# Patient Record
Sex: FEMALE | Race: WHITE
[De-identification: names, ages, dates, MRNs, and addresses within clinical notes are randomized per-mention and may not be internally consistent; named-entity substitution may affect disease eponyms.]

---

## 2017-01-09 ENCOUNTER — HOSPITAL ENCOUNTER (EMERGENCY)
Dept: HOSPITAL 75 - ER | Age: 22
Discharge: HOME | End: 2017-01-09
Payer: MEDICAID

## 2017-01-09 VITALS — WEIGHT: 135 LBS | HEIGHT: 66 IN | BODY MASS INDEX: 21.69 KG/M2

## 2017-01-09 VITALS — DIASTOLIC BLOOD PRESSURE: 95 MMHG | SYSTOLIC BLOOD PRESSURE: 130 MMHG

## 2017-01-09 DIAGNOSIS — G40.909: Primary | ICD-10-CM
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
## 2017-01-09 DIAGNOSIS — S40.011A: ICD-10-CM
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
## 2017-01-09 LAB
ALBUMIN SERPL-MCNC: 4.2 G/DL (ref 3.2–4.5)
ALT SERPL-CCNC: 13 U/L (ref 0–55)
ANION GAP SERPL CALC-SCNC: 9 MMOL/L (ref 5–14)
AST SERPL-CCNC: 14 U/L (ref 5–34)
BASOPHILS # BLD AUTO: 0 10^3/UL (ref 0–0.1)
BASOPHILS NFR BLD AUTO: 0 % (ref 0–10)
BILIRUB SERPL-MCNC: 0.4 MG/DL (ref 0.1–1)
BILIRUB UR QL STRIP: NEGATIVE
BUN SERPL-MCNC: 12 MG/DL (ref 7–18)
BUN/CREAT SERPL: 16
CALCIUM SERPL-MCNC: 8.8 MG/DL (ref 8.5–10.1)
CHLORIDE SERPL-SCNC: 108 MMOL/L (ref 98–107)
CO2 SERPL-SCNC: 21 MMOL/L (ref 21–32)
CREAT SERPL-MCNC: 0.74 MG/DL (ref 0.6–1.3)
EOSINOPHIL # BLD AUTO: 0.1 10^3/UL (ref 0–0.3)
EOSINOPHIL NFR BLD AUTO: 1 % (ref 0–10)
ERYTHROCYTE [DISTWIDTH] IN BLOOD BY AUTOMATED COUNT: 13.2 % (ref 10–14.5)
ETHANOL SERPL-MCNC: < 10 MG/DL (ref ?–10)
GFR SERPLBLD BASED ON 1.73 SQ M-ARVRAT: > 60 ML/MIN
GLUCOSE SERPL-MCNC: 95 MG/DL (ref 70–105)
KETONES UR QL STRIP: NEGATIVE
LEUKOCYTE ESTERASE UR QL STRIP: NEGATIVE
LYMPHOCYTES # BLD AUTO: 1.4 X 10^3 (ref 1–4)
LYMPHOCYTES NFR BLD AUTO: 14 % (ref 12–44)
MAGNESIUM SERPL-MCNC: 2.2 MG/DL (ref 1.8–2.4)
MCH RBC QN AUTO: 28 PG (ref 25–34)
MCHC RBC AUTO-ENTMCNC: 34 G/DL (ref 32–36)
MCV RBC AUTO: 83 FL (ref 80–99)
MONOCYTES # BLD AUTO: 0.8 X 10^3 (ref 0–1)
MONOCYTES NFR BLD AUTO: 8 % (ref 0–12)
NEUTROPHILS # BLD AUTO: 7.8 X 10^3 (ref 1.8–7.8)
NEUTROPHILS NFR BLD AUTO: 78 % (ref 42–75)
NITRITE UR QL STRIP: NEGATIVE
PH UR STRIP: 8 [PH] (ref 5–9)
PLATELET # BLD: 264 10^3/UL (ref 130–400)
PMV BLD AUTO: 11 FL (ref 7.4–10.4)
POTASSIUM SERPL-SCNC: 3.5 MMOL/L (ref 3.6–5)
PROT SERPL-MCNC: 7 G/DL (ref 6.4–8.2)
PROT UR QL STRIP: NEGATIVE
RBC # BLD AUTO: 4.56 10^6/UL (ref 4.35–5.85)
SODIUM SERPL-SCNC: 138 MMOL/L (ref 135–145)
SP GR UR STRIP: 1.01 (ref 1.02–1.02)
SQUAMOUS #/AREA URNS HPF: (no result) /HPF
UROBILINOGEN UR-MCNC: NORMAL MG/DL
WBC # BLD AUTO: 10 10^3/UL (ref 4.3–11)
WBC #/AREA URNS HPF: (no result) /HPF

## 2017-01-09 PROCEDURE — 84703 CHORIONIC GONADOTROPIN ASSAY: CPT

## 2017-01-09 PROCEDURE — 81000 URINALYSIS NONAUTO W/SCOPE: CPT

## 2017-01-09 PROCEDURE — 36415 COLL VENOUS BLD VENIPUNCTURE: CPT

## 2017-01-09 PROCEDURE — 80320 DRUG SCREEN QUANTALCOHOLS: CPT

## 2017-01-09 PROCEDURE — 93041 RHYTHM ECG TRACING: CPT

## 2017-01-09 PROCEDURE — 96374 THER/PROPH/DIAG INJ IV PUSH: CPT

## 2017-01-09 PROCEDURE — 84443 ASSAY THYROID STIM HORMONE: CPT

## 2017-01-09 PROCEDURE — 73030 X-RAY EXAM OF SHOULDER: CPT

## 2017-01-09 PROCEDURE — 80053 COMPREHEN METABOLIC PANEL: CPT

## 2017-01-09 PROCEDURE — 80306 DRUG TEST PRSMV INSTRMNT: CPT

## 2017-01-09 PROCEDURE — 96375 TX/PRO/DX INJ NEW DRUG ADDON: CPT

## 2017-01-09 PROCEDURE — 96361 HYDRATE IV INFUSION ADD-ON: CPT

## 2017-01-09 PROCEDURE — 71010: CPT

## 2017-01-09 PROCEDURE — 85025 COMPLETE CBC W/AUTO DIFF WBC: CPT

## 2017-01-09 PROCEDURE — 83735 ASSAY OF MAGNESIUM: CPT

## 2017-01-09 PROCEDURE — 72125 CT NECK SPINE W/O DYE: CPT

## 2017-01-09 PROCEDURE — 70450 CT HEAD/BRAIN W/O DYE: CPT

## 2017-01-09 NOTE — DIAGNOSTIC IMAGING REPORT
PROCEDURE: CT head and CT cervical spine without contrast.



TECHNIQUE: Multiple contiguous axial images were obtained

through the brain and cervical spine without the use of

intravenous contrast. Sagittal and coronal reformations through

the cervical spine were then performed.



INDICATION:  Seizure, head and neck pain.



COMPARISON: There are no prior studies available for comparison.



CT HEAD:

There is no mass, shift of the midline or hemorrhage to suggest

an acute intracranial abnormality. The normal tentorial blush is

noted. The ventricles are not abnormally dilated. The bone

window shows no evidence for a fracture or for a destructive

lesion. The sinuses were not visualized in their entirety. Where

visualized, the sinuses are clear. However, there does appear to

be sclerosis of both mastoid air cells, particularly on the

right. This may be a sequela of prior episodes of mastoiditis.

Clinical follow up is recommended.



The orbits are symmetrical and within normal limits.



IMPRESSION:

1. There is no evidence for acute intracranial abnormality. If

clinical concern regarding an acute abnormality persists

however, then MRI would be recommended for further study.

2. The appearance of the mastoid air cells does suggest previous

episodes of mastoiditis.



CT CERVICAL SPINE

The reconstructed parasagittal images show some straightening of

the cervical spine. This may be secondary to muscle spasm and/or

positioning. There is no fracture or acute bony abnormality

identified.



There is no evidence for spinal stenosis or nerve root

encroachment.



There is no sign of retropharyngeal edema. The thyroid gland is

generally unremarkable.  The lung apices are clear.



IMPRESSION:

There is no evidence for an acute bony abnormality of the

cervical spine.







Dictated by:



Dictated on workstation # [id number]

## 2017-01-09 NOTE — DIAGNOSTIC IMAGING REPORT
EXAMINATION: Right shoulder



INDICATION: Shoulder pain



Three views were obtained. There is no fracture, dislocation or

acute bony abnormality evident. The shoulder joint is fairly

well-maintained. The soft tissues are unremarkable.



IMPRESSION: There is no evidence for an acute bony abnormality.



Dictated by:



Dictated on workstation # [id number]

## 2017-01-09 NOTE — XMS REPORT
Continuity of Care Document

 Created on: 2016



NIKKI HUBBARD

External Reference #: [id number]

: 1995

Sex: Female



Demographics







 Address  [address]

[place]  [address]

 

 Home Phone  [phone]

 

 Preferred Language  English

 

 Marital Status  Unknown

 

 [employer] Affiliation  Unknown

 

 Race  Unknown

 

 Ethnic Group  Unknown





Author







 Author  [facility]

 

 Organization  [facility]

 

 Address  Unknown

 

 Phone  Unavailable







Support







 Name  Relationship  Address  Phone

 

 DEDE PATRICK MD  Caregiver  [address] [address] [phone]

 

 CANDELARIA COOPER MD  Caregiver  [address]

[place], [place]  [address] [phone]

 

 TOMYSVIPIN EMILY  Next Of Kin  [address]

[place], [place]  [id number] [phone]







Care Team Providers







 Care Team Member Name  Role  Phone

 

 CANDELARIA COOPER MD  PCP  [phone]







Insurance Providers







 Payer Name  Policy Number  Subscriber Name  Relationship

 

 [facility]  [id number]  Nikki Hubbard Self / Same As Patient







Advance Directives







 Directive  Response  Recorded Date/Time

 

 Advance Directives  No  16 10:09pm

 

 Health Care Power of   No  16 10:09pm

 

 Organ Donor  No  16 10:09pm

 

 Resuscitation Status  Full Code  16 10:09pm







Problems

Active Problems







 Medical Problem  Onset Date  Status

 

 Urinary tract infection  Unknown  Acute







Medications

Current Home Medications







 Medication  Dose  Units  Route  Directions  Days/Qty  Instructions  Start Date

 

 Prenatal Vit W-Ca,Fe,Fa(<1 Mg) 1 Each  1  Each  Oral  Daily        16

 

 Ferrous Sulfate 325 Mg  325  Mg  Oral  Twice A Day        16





Past Home Medications







 Medication  Directions  Ordered  Status

 

 Cephalexin 500 Mg Capsule, 500 Mg Oral  Three Times A Day  10/12/15  
Discontinued







Social History







 Social History Problem  Response  Recorded Date/Time

 

 Alcohol Use  Denies Use  10/12/2015 6:51pm

 

 Recreational Drug Use  No  10/12/2015 6:51pm

 

 Recent Foreign Travel  No  2016 10:12pm

 

 Smoking Status  Former Smoker  2016 10:10pm









 Query  Response  Start Date  Stop Date

 

 Smoking Status  Former Smoker      







Hospital Discharge Instructions

No hospital discharge instructions.



Plan of Care







 Discharge Date  16 11:30pm

 

 Instructions/Education Provided  OB OUTPATIENT DISCHARGE

 

 Prescriptions  See Medication Section







Functional Status

No functional status results.



Allergies, Adverse Reactions, Alerts

No known allergies.



Immunizations







 Name  Given  Type

 

 Tetanus Booster (TDap)  Unknown  Historical







Vital Signs

Acute Vital Signs





 Vital  Response  Date/Time

 

 Temperature (Fahrenheit)  98.3 degrees F (97.6 - 99.5)  2016 10:02pm

 

 Temperature (Calculated Celsius)  36.37693 degrees C (36.4 - 37.5)  2016 
10:02pm

 

 Temperature Source  Tympanic  2016 10:02pm

 

 Pulse Rate (adult)  71 bpm (60 - 90)  2016 10:43pm

 

 Respiratory Rate  18 bpm (12 - 24)  2016 10:43pm

 

 Blood Pressure  143/72 mm Hg  2016 10:43pm

 

    Blood Pressure Mean  95 mm Hg  2016 10:43pm

 

 Pain      

 

    Pain Intensity  7  2016 10:11pm

 

 Height (Feet)  5 feet  2016 10:17pm

 

 Height (Inches)  4.00 inches  2016 10:17pm

 

 Height (Calculated Centimeters)  162.024800 cm  2016 10:17pm

 

 Weight (Pounds)  153 pounds  2016 10:17pm

 

 Weight (Ounces)  6.0 oz  2016 10:17pm

 

 Weight (Calculated Grams)  01267.730 gm  2016 10:17pm

 

 Weight (Calculated Kilograms)  69.835200 kilograms  2016 10:17pm

 

 Calculated BMI  26.3  2016 10:17pm







Results

Laboratory Results







 Test Name  Result  Units  Flags  Reference  Collection Date/Time  Result Date/
Time  Comments

 

 Urine Color  YELLOW           2016 10:00pm  2016 10:55pm   

 

 Urine Clarity  CLEAR           2016 10:00pm  2016 10:55pm   

 

 Urine pH  7        5-9  2016 10:00pm  2016 10:55pm   

 

 Urine Specific Gravity  1.010     *  1.016-1.022  2016 10:00pm  2016 10:55pm   

 

 Urine Protein  NEGATIVE        NEGATIVE  2016 10:00pm  2016 10:
55pm   

 

 Urine Glucose (UA)  NEGATIVE        NEGATIVE  2016 10:00pm  2016 10
:55pm   

 

 Urine RBC (Auto)  NEGATIVE        NEGATIVE  2016 10:00pm  2016 10:
55pm   

 

 Urine Ketones  NEGATIVE        NEGATIVE  2016 10:00pm  2016 10:
55pm   

 

 Urine Nitrite  NEGATIVE        NEGATIVE  2016 10:00pm  2016 10:
55pm   

 

 Urine Bilirubin  NEGATIVE        NEGATIVE  2016 10:00pm  2016 10:
55pm   

 

 Urine Urobilinogen  NORMAL  MG/DL     NORMAL  2016 10:00pm  2016 10
:55pm   

 

 Urine Leukocyte Esterase  1+     *  NEGATIVE  2016 10:00pm  2016 10
:55pm   

 

 Urine RBC  NONE  /HPF        2016 10:00pm  2016 10:55pm   

 

 Urine WBC  2-5  /HPF        2016 10:00pm  2016 10:55pm   

 

 Urine Bacteria  TRACE  /HPF        2016 10:00pm  2016 10:55pm   

 

 Urine Squamous Epithelial Cells  0-2  /HPF        2016 10:00pm  2016 10:55pm   

 

 Urine Crystals  PRESENT  /LPF  *     2016 10:00pm  2016 10:55pm   

 

 Urine Amorphous Sediment  FEW EDDIE PHOSPHATE  /LPF  *     2016 10:00pm  
2016 10:55pm   

 

 Urine Casts  NONE  /LPF        2016 10:00pm  2016 10:55pm   

 

 Urine Mucus  NEGATIVE  /LPF        2016 10:00pm  2016 10:55pm   

 

 Urine Culture Indicated  NO           2016 10:00pm  2016 10:55pm   







Procedures

No known history of procedures.



Encounters







 Encounter  Location  Arrival/Admit Date  Discharge/Depart Date  Attending 
Provider

 

 Departed Clinic  [facility]  16 9:48pm  16 11:
30pm  DEDE PATRICK MD

## 2017-01-09 NOTE — DIAGNOSTIC IMAGING REPORT
EXAM: PA chest at 7:50 p.m.



INDICATION: Seizure.



COMPARISON:  There are no prior studies available for

comparison.



FINDINGS:

The heart size is within normal limits. The lungs are clear.

There is no evidence of failure, pneumonia or for a pleural

effusion. The mediastinum is not widened. The osseous structures

are intact.



IMPRESSION:

There is no evidence for an acute cardiopulmonary abnormality.



Dictated by:



Dictated on workstation # [id number]

## 2017-01-09 NOTE — ED NEUROLOGICAL PROBLEM
General


Chief Complaint:  Neurological Problems


Stated Complaint:  SEIZURE


Nursing Triage Note:  


c/o seizure activity. Incident happened approx 1600 today. Pt awake, alert, and 


active on ER admission. Hx of a seizure approx 3 years ago.


Nursing Sepsis Screen:  No Definite Risk


Source:  patient, family, EMS


Exam Limitations:  no limitations





History of Present Illness


Time seen by provider:  18:16


Initial Comments


This 21-year-old presents to the emergency room with seizure like activity.  

Patient was at home with her  when he heard her screaming.  He came into 

the room where he found her lying on the floor in a rigid posture.  She was 

unresponsive at the time.  He reports frothing saliva and blood in her mouth.  

The altered state and lasted approximately 1-2 minutes.  She then became alert 

with relaxed posture.  However, she remained somewhat confused and cognitively 

sluggish in a post ictal-like state.  She is alert and oriented upon 

presentation to the emergency room.  Patient reports a history of seizure 

several years ago.  She has not taken any seizure medication in about 3 years 

and has not had a seizure during that time.  She complains of headache, left 

leg pain, and right arm pain.  Her most significant pain is in the right 

shoulder.  She has previously seen a neurologist and stopped taking anti-

seizure medication at her physician's direction.  She does not recall the name 

of her neurologist but she sees Dr. Cooper as her primary care provider.  She 

denies pregnancy or any recent illness.





Allergies and Home Medications


Allergies


Coded Allergies:  


     No Known Drug Allergies (Unverified , 10/12/15)





Home Medications


Ferrous Sulfate 325 Mg Tablet. 325 MG PO BID (Reported) 


Levetiracetam 500 Mg Tablet #30 500 MG PO BID 


   Prescribed by: ARJUN KNOWLES on 1/10/17 0658


Prenatal Vit W-Ca,Fe,FA(<1 mg) 1 Each Tablet 1 EACH PO DAILY (Reported) 





Constitutional:   no symptoms reported


Eyes:   No Symptoms Reported


Ears, Nose, Mouth, Throat:   see HPI


Respiratory:   no symptoms reported


Cardiovascular:   no symptoms reported


Gastrointestinal:   no symptoms reported


Genitourinary:   no symptoms reported


Pregnant:  No


Musculoskeletal:   see HPI


Skin:   no symptoms reported


Psychiatric/Neurological:   See HPI


Endocrine:   No Symptoms Reported





Past Medical-Social-Family Hx


Patient Social History


Alcohol Use:  Denies Use


Recreational Drug Use:  No


Smoking Status:  Former Smoker


Recent Foreign Travel:  No


Contact w/Someone Who Travel:  No


Recent Infectious Disease Expo:  No


Recent Hopitalizations:  No


Physical Abuse Screen:  No


Sexual Abuse:  No





Immunizations Up To Date


Tetanus Booster (TDap):  Unknown





Surgeries


HX Surgeries:  No





Respiratory


Hx Respiratory Disorders:  No





Cardiovascular


Hx Cardiac Disorders:  Yes


Cardiac Disorders:  Hypertension





Neurological


Hx Neurological Disorders:  No





Reproductive System


Hx Reproductive Disorders:  No





Genitourinary


Hx Genitourinary Disorders:  No





Gastrointestinal


Hx Gastrointestinal Disorders:  No





Musculoskeletal


Hx Musculoskeletal Disorders:  No





Endocrine


Hx Endocrine Disorders:  No





HEENT


HX ENT Disorders:  No





Cancer


Hx Cancer:  No





Psychosocial


Hx Psychiatric Problems:  No





Integumentary


HX Skin/Integumentary Disorder:  No





Blood Transfusions


Hx Blood Disorders:  No


Adverse Reaction to a Blood Tr:  No





Family Medical History


Significant Family History:  No Pertinent Family Hx


Family Medial History:  


Patient reports no known family medical history.





Physical Exam


Vital Signs





 Vital Sign - Last 12Hours








 17





 17:37 21:17


 


Temp 98.1 


 


Pulse 70 


 


Resp  17


 


B/P 130/95 


 


Pulse Ox  100


 


O2 Delivery Room Air 





Capillary Refill : Less Than 3 Seconds


General Appearance:   WD/WN no apparent distress


HEENT:   PERRL/EOMI normal ENT inspection TMs normal other (Poor dentition with 

fractured or decayed teeth)


Neck:   non-tender full range of motion supple normal inspection


Respiratory:   lungs clear normal breath sounds no respiratory distress no 

accessory muscle use


Cardiovascular:   regular rate, rhythm no edema no murmur


Gastrointestinal:   normal bowel sounds non tender soft


Extremities:   normal inspection no pedal edema


Neurologic/Psychiatric:   CNs II-XII nml as tested no motor/sensory deficits 

alert normal mood/affect oriented x 3


Crainal Nerves:   normal hearing normal speech PERRL


Coordination/Gait:   normal finger to nose


Motor/Sensory:   no motor deficit no sensory deficit


Skin:   normal color warm/dry





Progress/Results/Core Measures


Results/Orders


Lab Results





 Laboratory Tests








Test


  17


17:35 17


18:50 Range/Units


 


 


Ur Tricyclic Antidepressants


Screen NEGATIVE 


  


  NEGATIVE  


 


 


Urine Amorphous Sediment


  MOD EDDIE


PHOSPHATE H 


   /LPF


 


 


Urine Amphetamines Screen NEGATIVE   NEGATIVE  


 


Urine Bacteria FEW H   /HPF


 


Urine Barbiturates Screen NEGATIVE   NEGATIVE  


 


Urine Benzodiazepines Screen NEGATIVE   NEGATIVE  


 


Urine Bilirubin NEGATIVE   NEGATIVE  


 


Urine Cannabinoids Screen NEGATIVE   NEGATIVE  


 


Urine Casts NONE    /LPF


 


Urine Clarity VERY CLOUDY H   


 


Urine Cocaine Screen NEGATIVE   NEGATIVE  


 


Urine Color YELLOW    


 


Urine Crystals PRESENT H   /LPF


 


Urine Culture Indicated NO    


 


Urine Glucose (UA) NEGATIVE   NEGATIVE  


 


Urine Ketones NEGATIVE   NEGATIVE  


 


Urine Leukocyte Esterase NEGATIVE   NEGATIVE  


 


Urine Methadone Screen NEGATIVE   NEGATIVE  


 


Urine Methamphetamines Screen NEGATIVE   NEGATIVE  


 


Urine Mucus NEGATIVE    /LPF


 


Urine Nitrite NEGATIVE   NEGATIVE  


 


Urine Opiates Screen NEGATIVE   NEGATIVE  


 


Urine Oxycodone Screen NEGATIVE   NEGATIVE  


 


Urine Phencyclidine Screen NEGATIVE   NEGATIVE  


 


Urine Propoxyphene Screen NEGATIVE   NEGATIVE  


 


Urine Protein NEGATIVE   NEGATIVE  


 


Urine RBC NONE    /HPF


 


Urine RBC (Auto) NEGATIVE   NEGATIVE  


 


Urine Specific Gravity 1.015 L  1.016-1.022  


 


Urine Squamous Epithelial


Cells 5-10 


  


   /HPF


 


 


Urine Urobilinogen NORMAL   NORMAL  MG/DL


 


Urine WBC NONE    /HPF


 


Urine pH 8   5-9  


 


Alanine Aminotransferase


(ALT/SGPT) 


  13 


  0-55  U/L


 


 


Albumin  4.2  3.2-4.5  G/DL


 


Alkaline Phosphatase  37 L   U/L


 


Anion Gap  9  5-14  MMOL/L


 


Aspartate Amino Transf


(AST/SGOT) 


  14 


  5-34  U/L


 


 


BUN/Creatinine Ratio  16   


 


Basophils # (Auto)


  


  0.0 


  0.0-0.1


10^3/uL


 


Basophils (%) (Auto)  0  0-10  %


 


Blood Urea Nitrogen  12  7-18  MG/DL


 


Calcium Level  8.8  8.5-10.1  MG/DL


 


Carbon Dioxide Level  21  21-32  MMOL/L


 


Chloride Level  108 H   MMOL/L


 


Creatinine


  


  0.74 


  0.60-1.30


MG/DL


 


Eosinophils # (Auto)


  


  0.1 


  0.0-0.3


10^3/uL


 


Eosinophils (%) (Auto)  1  0-10  %


 


Estimat Glomerular Filtration


Rate 


  > 60 


   


 


 


Glucose Level  95    MG/DL


 


Hematocrit  38  35-52  %


 


Hemoglobin  12.9  11.5-16.0  G/DL


 


Lymphocytes # (Auto)  1.4  1.0-4.0  X 10^3


 


Lymphocytes (%) (Auto)  14  12-44  %


 


Magnesium Level  2.2  1.8-2.4  MG/DL


 


Mean Corpuscular Hemoglobin  28  25-34  PG


 


Mean Corpuscular Hemoglobin


Concent 


  34 


  32-36  G/DL


 


 


Mean Corpuscular Volume  83  80-99  FL


 


Mean Platelet Volume  11.0 H 7.4-10.4  FL


 


Monocytes # (Auto)  0.8  0.0-1.0  X 10^3


 


Monocytes (%) (Auto)  8  0-12  %


 


Neutrophils # (Auto)  7.8  1.8-7.8  X 10^3


 


Neutrophils (%) (Auto)  78 H 42-75  %


 


Platelet Count


  


  264 


  130-400


10^3/uL


 


Potassium Level  3.5 L 3.6-5.0  MMOL/L


 


Red Blood Count


  


  4.56 


  4.35-5.85


10^6/uL


 


Red Cell Distribution Width  13.2  10.0-14.5  %


 


Serum Alcohol  < 10  <10  MG/DL


 


Serum Pregnancy Test,


Qualitative 


  NEGATIVE 


  NEGATIVE  


 


 


Sodium Level  138  135-145  MMOL/L


 


TSH [place] Testing


  


  1.44 


  0.35-4.94


UIU/ML


 


Total Bilirubin  0.4  0.1-1.0  MG/DL


 


Total Protein  7.0  6.4-8.2  G/DL


 


White Blood Count


  


  10.0 


  4.3-11.0


10^3/uL








My Orders





 Orders-ARJUN SUAREZ MD


Ct Head/Cervical Spine Wo (17 18:31)


Monitor-Rhythm Ecg Trace Only (17 18:31)


Chest 1 View, Ap/Pa Only (17 18:31)


Shoulder, Right, 3 Views (17 18:31)


Alcohol (17 18:31)


Cbc With Automated Diff (17 18:31)


Comprehensive Metabolic Panel (17 18:31)


Hcg,Qualitative Serum (17 18:31)


Magnesium (17 18:31)


Thyroid Analyzer (17 18:31)


Saline Lock/Iv-Start (17 18:31)


Ns Iv 500 Ml (Sodium Chloride 0.9%) (17 18:31)


Fentanyl  Injection (Sublimaze Injection (17 18:45)


Ketorolac Injection (Toradol Injection) (17 21:15)


Levetiracetam Tablet (Keppra Tablet) (17 21:15)


Levetiracetam Tablet (Keppra Tablet) (17 21:00)





Medications Given in ED





 Current Medications








 Medications  Dose


 Ordered  Sig/Nestor


 Route  Start Time


 Stop Time Status Last Admin


Dose Admin


 


 Ketorolac


 Tromethamine  30 mg  ONCE  ONCE


 IVP  17 21:15


 17 21:16 DC 1/9/17 21:11


30 MG


 


 Levetiracetam  500 mg  ONCE  ONCE


 PO  17 21:15


 17 21:16 DC 17 21:10


500 MG








Vital Signs/I&O





 Vital Sign - Last 12Hours








 17





 17:37 18:54 21:17


 


Temp 98.1 98.1 98.1


 


Pulse 70  70


 


Resp   17


 


B/P 130/95  


 


Pulse Ox   100


 


O2 Delivery Room Air  Room Air








Blood Pressure Mean:  107


Progress Note :  


Progress Note


Patient seen and examined.  IV fluids, imaging, and fentanyl were ordered.  

Imaging revealed no bony injuries or intracranial injury.  Labs were relatively 

unremarkable.  Case was reviewed with Dr. Cooper who would like for the patient 

to be started on Keppra.  The first dose of Keppra was administered in the ER.  

Toradol was administered for additional pain relief.





Diagnostic Imaging





   Diagonstic Imaging:  Xray


   Plain Films/CT/US/NM/MRI:  other (Right shoulder)


Comments


Right shoulder x-ray viewed by me and report reviewed.  See report below:





NAME:      NIKKI HUBBARD


MED REC#:   [id number]


ACCOUNT#:   [id number]


PT STATUS:   DEP ER


:      1995


PHYSICIAN:    ARJUN SUAREZ MD


ADMIT DATE:   17/ER


***Signed***


Date of Exam:   17





SHOULDER, RIGHT, 3 VIEWS


 


EXAMINATION: Right shoulder





INDICATION: Shoulder pain





Three views were obtained. There is no fracture, dislocation or


acute bony abnormality evident. The shoulder joint is fairly


well-maintained. The soft tissues are unremarkable.





IMPRESSION: There is no evidence for an acute bony abnormality.





Dictated by:





Dictated on workstation # [id number]





Dict:   17


Trans:   17 [id number]


KEEGAN  [id number]





Interpreted by:       TOSHIA TAVERAS MD


Electronically signed by:TOSHIA TAVERAS MD   [id number]








   Diagonstic Imaging:  CT


   Plain Films/CT/US/NM/MRI:  c-spine, head


Comments


NAME:      NIKKI HUBBARD


MED REC#:   [id number]


ACCOUNT#:   [id number]


PT STATUS:   DEP ER


:      1995


PHYSICIAN:    ARJUN SUAREZ MD


ADMIT DATE:   17/ER


***Signed***


Date of Exam:   17





CT HEAD/CERVICAL SPINE WO


 


PROCEDURE: CT head and CT cervical spine without contrast.





TECHNIQUE: Multiple contiguous axial images were obtained


through the brain and cervical spine without the use of


intravenous contrast. Sagittal and coronal reformations through


the cervical spine were then performed.





INDICATION:  Seizure, head and neck pain.





COMPARISON: There are no prior studies available for comparison.





CT HEAD:


There is no mass, shift of the midline or hemorrhage to suggest


an acute intracranial abnormality. The normal tentorial blush is


noted. The ventricles are not abnormally dilated. The bone


window shows no evidence for a fracture or for a destructive


lesion. The sinuses were not visualized in their entirety. Where


visualized, the sinuses are clear. However, there does appear to


be sclerosis of both mastoid air cells, particularly on the


right. This may be a sequela of prior episodes of mastoiditis.


Clinical follow up is recommended.





The orbits are symmetrical and within normal limits.





IMPRESSION:


1. There is no evidence for acute intracranial abnormality. If


clinical concern regarding an acute abnormality persists


however, then MRI would be recommended for further study.


2. The appearance of the mastoid air cells does suggest previous


episodes of mastoiditis.





CT CERVICAL SPINE


The reconstructed parasagittal images show some straightening of


the cervical spine. This may be secondary to muscle spasm and/or


positioning. There is no fracture or acute bony abnormality


identified.





There is no evidence for spinal stenosis or nerve root


encroachment.





There is no sign of retropharyngeal edema. The thyroid gland is


generally unremarkable.  The lung apices are clear.





IMPRESSION:


There is no evidence for an acute bony abnormality of the


cervical spine.











Dictated by:





Dictated on workstation # [id number]





Dict:   17


Trans:   17


[facility]  [phone]





Interpreted by:       TOSHIA TAVERAS MD


Electronically signed by:TOSHIA TAVERAS MD   172








   Diagonstic Imaging:  Xray


   Plain Films/CT/US/NM/MRI:  chest


Comments


NAME:      NIKKI HUBBARD


MED REC#:   [id number]


ACCOUNT#:   [id number]


PT STATUS:   DEP ER


:      1995


PHYSICIAN:    ARJUN SUAREZ MD


ADMIT DATE:   17/ER


***Signed***


Date of Exam:   17





CHEST 1 VIEW, AP/PA ONLY


 


EXAM: PA chest at 7:50 p.m.





INDICATION: Seizure.





COMPARISON:  There are no prior studies available for


comparison.





FINDINGS:


The heart size is within normal limits. The lungs are clear.


There is no evidence of failure, pneumonia or for a pleural


effusion. The mediastinum is not widened. The osseous structures


are intact.





IMPRESSION:


There is no evidence for an acute cardiopulmonary abnormality.





Dictated by:





Dictated on workstation # [id number]





Dict:   17


Trans:   17


[facility]  [id number]





Interpreted by:       TOSHIA TAVERAS MD


Electronically signed by:TOSHIA TAVERAS MD   17





Departure


Impression


Impression:  


 Primary Impression:  


 Seizure


 Additional Impression:  


 Contusion of right shoulder


 Qualified Code:  S40.011A - Contusion of right shoulder, initial encounter


Disposition:   HOME, SELF-CARE


Condition:  Improved





Departure-Patient Inst.


Decision time for Depature:  21:07


Referrals:  


CANDELARIA COOPER MD (PCP/Family)


Primary Care Physician


Patient Instructions:  Seizures





Add. Discharge Instructions:


Stay well-hydrated and eat a well-balanced diet.  Follow-up with Dr. Cooper as 

soon as possible.  Use your Keppra as prescribed.  Return to emergency room if 

you have worsening symptoms.  For pain you may take Tylenol up to 1000 mg every 

6 hours as needed and/or ibuprofen up to 600 mg every 6 hours as needed.








All discharge instructions reviewed with patient and/or family. Voiced 

understanding.


Scripts


Levetiracetam (Keppra)500 Mg [id number] Mg PO BID #30 TAB


   Prov:ARJUN SUAREZ MD         1/10/17





Copy


Copies To 1:   CANDELARIA COOPER MD, JOSHUA T MD 2017 21:08


Scripts


Levetiracetam (Keppra)500 Mg [id number] Mg PO BID #30 TAB


   Prov:ARJUN SUAREZ MD         1/10/17








ARJUN SUAREZ MD 2017 21:08

## 2017-01-20 ENCOUNTER — HOSPITAL ENCOUNTER (OUTPATIENT)
Dept: HOSPITAL 75 - RT | Age: 22
End: 2017-01-20
Attending: FAMILY MEDICINE
Payer: MEDICAID

## 2017-01-20 DIAGNOSIS — R56.9: Primary | ICD-10-CM

## 2017-01-20 PROCEDURE — 95819 EEG AWAKE AND ASLEEP: CPT

## 2017-01-20 NOTE — XMS REPORT
Continuity of Care Document

 Created on: 2016



NIKKI HUBBARD

External Reference #: [id number]

: 1995

Sex: Female



Demographics







 Address  [address]

[place]  [address]

 

 Home Phone  [phone]

 

 Preferred Language  English

 

 Marital Status  Unknown

 

 Mormonism Affiliation  Unknown

 

 Race  Unknown

 

 Ethnic Group  Unknown





Author







 Author  [facility]

 

 Organization  [facility]

 

 Address  Unknown

 

 Phone  Unavailable







Support







 Name  Relationship  Address  Phone

 

 DEDE PATRICK MD  Caregiver  [address] [address] [phone]

 

 CANDELARIA COOPER MD  Caregiver  [address], [address]

[place], [place]  [address] [phone]

 

 TOMYSVIPIN EMILY  Next Of Kin  [address]

[place], [place]  [id number] [phone]







Care Team Providers







 Care Team Member Name  Role  Phone

 

 CANDELARIA COOPER MD  PCP  [phone]







Insurance Providers







 Payer Name  Policy Number  Subscriber Name  Relationship

 

 [facility]  [id number]  Nikki Hubbard Self / Same As Patient







Advance Directives







 Directive  Response  Recorded Date/Time

 

 Advance Directives  No  16 10:09pm

 

 Health Care Power of   No  16 10:09pm

 

 Organ Donor  No  16 10:09pm

 

 Resuscitation Status  Full Code  16 10:09pm







Problems

Active Problems







 Medical Problem  Onset Date  Status

 

 Urinary tract infection  Unknown  Acute







Medications

Current Home Medications







 Medication  Dose  Units  Route  Directions  Days/Qty  Instructions  Start Date

 

 Prenatal Vit W-Ca,Fe,Fa(<1 Mg) 1 Each  1  Each  Oral  Daily        16

 

 Ferrous Sulfate 325 Mg  325  Mg  Oral  Twice A Day        16





Past Home Medications







 Medication  Directions  Ordered  Status

 

 Cephalexin 500 Mg Capsule, 500 Mg Oral  Three Times A Day  10/12/15  
Discontinued







Social History







 Social History Problem  Response  Recorded Date/Time

 

 Alcohol Use  Denies Use  10/12/2015 6:51pm

 

 Recreational Drug Use  No  10/12/2015 6:51pm

 

 Recent Foreign Travel  No  2016 10:12pm

 

 Smoking Status  Former Smoker  2016 10:10pm









 Query  Response  Start Date  Stop Date

 

 Smoking Status  Former Smoker      







Hospital Discharge Instructions

No hospital discharge instructions.



Plan of Care







 Discharge Date  16 11:30pm

 

 Instructions/Education Provided  OB OUTPATIENT DISCHARGE

 

 Prescriptions  See Medication Section







Functional Status

No functional status results.



Allergies, Adverse Reactions, Alerts

No known allergies.



Immunizations







 Name  Given  Type

 

 Tetanus Booster (TDap)  Unknown  Historical







Vital Signs

Acute Vital Signs





 Vital  Response  Date/Time

 

 Temperature (Fahrenheit)  98.3 degrees F (97.6 - 99.5)  2016 10:02pm

 

 Temperature (Calculated Celsius)  36.97378 degrees C (36.4 - 37.5)  2016 
10:02pm

 

 Temperature Source  Tympanic  2016 10:02pm

 

 Pulse Rate (adult)  71 bpm (60 - 90)  2016 10:43pm

 

 Respiratory Rate  18 bpm (12 - 24)  2016 10:43pm

 

 Blood Pressure  143/72 mm Hg  2016 10:43pm

 

    Blood Pressure Mean  95 mm Hg  2016 10:43pm

 

 Pain      

 

    Pain Intensity  7  2016 10:11pm

 

 Height (Feet)  5 feet  2016 10:17pm

 

 Height (Inches)  4.00 inches  2016 10:17pm

 

 Height (Calculated Centimeters)  162.148156 cm  2016 10:17pm

 

 Weight (Pounds)  153 pounds  2016 10:17pm

 

 Weight (Ounces)  6.0 oz  2016 10:17pm

 

 Weight (Calculated Grams)  92797.730 gm  2016 10:17pm

 

 Weight (Calculated Kilograms)  69.013453 kilograms  2016 10:17pm

 

 Calculated BMI  26.3  2016 10:17pm







Results

Laboratory Results







 Test Name  Result  Units  Flags  Reference  Collection Date/Time  Result Date/
Time  Comments

 

 Urine Color  YELLOW           2016 10:00pm  2016 10:55pm   

 

 Urine Clarity  CLEAR           2016 10:00pm  2016 10:55pm   

 

 Urine pH  7        5-9  2016 10:00pm  2016 10:55pm   

 

 Urine Specific Gravity  1.010     *  1.016-1.022  2016 10:00pm  2016 10:55pm   

 

 Urine Protein  NEGATIVE        NEGATIVE  2016 10:00pm  2016 10:
55pm   

 

 Urine Glucose (UA)  NEGATIVE        NEGATIVE  2016 10:00pm  2016 10
:55pm   

 

 Urine RBC (Auto)  NEGATIVE        NEGATIVE  2016 10:00pm  2016 10:
55pm   

 

 Urine Ketones  NEGATIVE        NEGATIVE  2016 10:00pm  2016 10:
55pm   

 

 Urine Nitrite  NEGATIVE        NEGATIVE  2016 10:00pm  2016 10:
55pm   

 

 Urine Bilirubin  NEGATIVE        NEGATIVE  2016 10:00pm  2016 10:
55pm   

 

 Urine Urobilinogen  NORMAL  MG/DL     NORMAL  2016 10:00pm  2016 10
:55pm   

 

 Urine Leukocyte Esterase  1+     *  NEGATIVE  2016 10:00pm  2016 10
:55pm   

 

 Urine RBC  NONE  /HPF        2016 10:00pm  2016 10:55pm   

 

 Urine WBC  2-5  /HPF        2016 10:00pm  2016 10:55pm   

 

 Urine Bacteria  TRACE  /HPF        2016 10:00pm  2016 10:55pm   

 

 Urine Squamous Epithelial Cells  0-2  /HPF        2016 10:00pm  2016 10:55pm   

 

 Urine Crystals  PRESENT  /LPF  *     2016 10:00pm  2016 10:55pm   

 

 Urine Amorphous Sediment  FEW EDDIE PHOSPHATE  /LPF  *     2016 10:00pm  
2016 10:55pm   

 

 Urine Casts  NONE  /LPF        2016 10:00pm  2016 10:55pm   

 

 Urine Mucus  NEGATIVE  /LPF        2016 10:00pm  2016 10:55pm   

 

 Urine Culture Indicated  NO           2016 10:00pm  2016 10:55pm   







Procedures

No known history of procedures.



Encounters







 Encounter  Location  Arrival/Admit Date  Discharge/Depart Date  Attending 
Provider

 

 Departed Clinic  [facility]  16 9:48pm  16 11:
30pm  DEDE PATRICK MD

## 2017-02-03 NOTE — ELECTROENCEPHALOPATHY REPORT
PROCEDURE PHYSICIAN:   JORGE HA

 

DATE OF PROCEDURE:  

01/20/2017

 

Ms. Prerna Massey is a 21-year-old female with seizure disorder. 

First seizure was 3 years ago. The last event the patient does

not remember. She was walking outside, talked to the boyfriend.

Went back to home and her boyfriend heard a scream.  He ran

inside and the patient was lying on the floor in the kitchen. The

patient woke up in the hospital. 

 

The background rhythm consisted of 10 Hz, 60 to 85 microvolts in

amplitude, bilaterally symmetrical over the vertex region which

was reactive to eye opening. Intermix was diffuse spike and slow

wave activity, 3 Hz in frequency, more prominent in the frontal

lobes lasting up to 1.5 seconds. The patient was awake, drowsy

and asleep during this recording. Hyperventilation was performed

and there was no build-up of diffuse or focal slow wave activity.

Intermittent photic stimulation was done at various flash

frequencies and no photic driving response was seen. 

 

IMPRESSION:

This EEG is abnormal in awake and sleepy states. The epileptiform

activity described above is suggestive of generalized seizure

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   Clinical

correlation is suggested. 

 

 

 

Job ID: [id number]

Dictated Date: 02/02/2017 15:34:07 

Transcription Date: 02/03/2017 10:06:55 / esther

## 2017-10-30 ENCOUNTER — HOSPITAL ENCOUNTER (OUTPATIENT)
Dept: HOSPITAL 75 - RAD | Age: 22
End: 2017-10-30
Attending: FAMILY MEDICINE
Payer: MEDICAID

## 2017-10-30 DIAGNOSIS — Z3A.01: ICD-10-CM

## 2017-10-30 DIAGNOSIS — Z36.87: Primary | ICD-10-CM

## 2017-10-30 PROCEDURE — 76801 OB US < 14 WKS SINGLE FETUS: CPT

## 2017-10-30 NOTE — DIAGNOSTIC IMAGING REPORT
First trimester OB ultrasound.



INDICATION: Dating.



FINDINGS: There is a normal-appearing single intrauterine

pregnancy. An embryo is seen with cardiac activity at 163 beats

per minute.



The crown-rump length is at 7 weeks and 5 days. YAYA is 6/13/18.

The right ovary appears unremarkable. The left ovary is seen.. 



IMPRESSION: Live single intrauterine pregnancy.



Dictated by: 



  Dictated on workstation # [id number]

## 2017-12-21 ENCOUNTER — HOSPITAL ENCOUNTER (EMERGENCY)
Dept: HOSPITAL 75 - ER | Age: 22
Discharge: HOME | End: 2017-12-21
Payer: MEDICAID

## 2017-12-21 VITALS — HEIGHT: 63 IN | WEIGHT: 120 LBS | BODY MASS INDEX: 21.26 KG/M2

## 2017-12-21 VITALS — SYSTOLIC BLOOD PRESSURE: 130 MMHG | DIASTOLIC BLOOD PRESSURE: 92 MMHG

## 2017-12-21 DIAGNOSIS — O16.2: ICD-10-CM

## 2017-12-21 DIAGNOSIS — I10: ICD-10-CM

## 2017-12-21 DIAGNOSIS — H53.2: ICD-10-CM

## 2017-12-21 DIAGNOSIS — O99.352: Primary | ICD-10-CM

## 2017-12-21 DIAGNOSIS — O99.89: ICD-10-CM

## 2017-12-21 DIAGNOSIS — Z3A.15: ICD-10-CM

## 2017-12-21 DIAGNOSIS — R51: ICD-10-CM

## 2017-12-21 DIAGNOSIS — G40.909: ICD-10-CM

## 2017-12-21 LAB
ALBUMIN SERPL-MCNC: 3.6 GM/DL (ref 3.2–4.5)
ALT SERPL-CCNC: 7 U/L (ref 0–55)
ANION GAP SERPL CALC-SCNC: 9 MMOL/L (ref 5–14)
AST SERPL-CCNC: 11 U/L (ref 5–34)
BASOPHILS # BLD AUTO: 0 10^3/UL (ref 0–0.1)
BASOPHILS NFR BLD AUTO: 0 % (ref 0–10)
BILIRUB SERPL-MCNC: 0.3 MG/DL (ref 0.1–1)
BILIRUB UR QL STRIP: NEGATIVE
BUN SERPL-MCNC: 6 MG/DL (ref 7–18)
BUN/CREAT SERPL: 11
CALCIUM SERPL-MCNC: 8.5 MG/DL (ref 8.5–10.1)
CHLORIDE SERPL-SCNC: 107 MMOL/L (ref 98–107)
CO2 SERPL-SCNC: 22 MMOL/L (ref 21–32)
CREAT SERPL-MCNC: 0.55 MG/DL (ref 0.6–1.3)
EOSINOPHIL # BLD AUTO: 0.1 10^3/UL (ref 0–0.3)
EOSINOPHIL NFR BLD AUTO: 1 % (ref 0–10)
ERYTHROCYTE [DISTWIDTH] IN BLOOD BY AUTOMATED COUNT: 13.3 % (ref 10–14.5)
ETHANOL SERPL-MCNC: < 10 MG/DL (ref ?–10)
GFR SERPLBLD BASED ON 1.73 SQ M-ARVRAT: > 60 ML/MIN
GLUCOSE SERPL-MCNC: 86 MG/DL (ref 70–105)
KETONES UR QL STRIP: NEGATIVE
LEUKOCYTE ESTERASE UR QL STRIP: (no result)
LYMPHOCYTES # BLD AUTO: 2.6 X 10^3 (ref 1–4)
LYMPHOCYTES NFR BLD AUTO: 23 % (ref 12–44)
MAGNESIUM SERPL-MCNC: 1.7 MG/DL (ref 1.8–2.4)
MCH RBC QN AUTO: 31 PG (ref 25–34)
MCHC RBC AUTO-ENTMCNC: 35 G/DL (ref 32–36)
MCV RBC AUTO: 87 FL (ref 80–99)
MONOCYTES # BLD AUTO: 0.9 X 10^3 (ref 0–1)
MONOCYTES NFR BLD AUTO: 8 % (ref 0–12)
NEUTROPHILS # BLD AUTO: 8 X 10^3 (ref 1.8–7.8)
NEUTROPHILS NFR BLD AUTO: 68 % (ref 42–75)
NITRITE UR QL STRIP: NEGATIVE
PH UR STRIP: 8 [PH] (ref 5–9)
PLATELET # BLD: 245 10^3/UL (ref 130–400)
PMV BLD AUTO: 10.9 FL (ref 7.4–10.4)
POTASSIUM SERPL-SCNC: 3.6 MMOL/L (ref 3.6–5)
PROT SERPL-MCNC: 6.7 GM/DL (ref 6.4–8.2)
PROT UR QL STRIP: NEGATIVE
RBC # BLD AUTO: 4.01 10^6/UL (ref 4.35–5.85)
SODIUM SERPL-SCNC: 138 MMOL/L (ref 135–145)
SP GR UR STRIP: 1.01 (ref 1.02–1.02)
SQUAMOUS #/AREA URNS HPF: (no result) /HPF
UROBILINOGEN UR-MCNC: NORMAL MG/DL
WBC # BLD AUTO: 11.7 10^3/UL (ref 4.3–11)
WBC #/AREA URNS HPF: (no result) /HPF

## 2017-12-21 PROCEDURE — 80320 DRUG SCREEN QUANTALCOHOLS: CPT

## 2017-12-21 PROCEDURE — 85025 COMPLETE CBC W/AUTO DIFF WBC: CPT

## 2017-12-21 PROCEDURE — 80306 DRUG TEST PRSMV INSTRMNT: CPT

## 2017-12-21 PROCEDURE — 83735 ASSAY OF MAGNESIUM: CPT

## 2017-12-21 PROCEDURE — 36415 COLL VENOUS BLD VENIPUNCTURE: CPT

## 2017-12-21 PROCEDURE — 81000 URINALYSIS NONAUTO W/SCOPE: CPT

## 2017-12-21 PROCEDURE — 70450 CT HEAD/BRAIN W/O DYE: CPT

## 2017-12-21 PROCEDURE — 80053 COMPREHEN METABOLIC PANEL: CPT

## 2017-12-21 PROCEDURE — 93041 RHYTHM ECG TRACING: CPT

## 2017-12-21 NOTE — DIAGNOSTIC IMAGING REPORT
PROCEDURE: CT head without contrast.



TECHNIQUE: Multiple contiguous axial images were obtained through

the brain without the use of intravenous contrast.



INDICATION: Diplopia. The study compared 01/09/2017



FINDINGS:



There is no intracranial hemorrhage. There is no hydrocephalus.

No focal or generalized cerebral edema. The basilar cisterns are

patent, the sulci are non-effaced. The orbits, sinuses, and

calvarium appeared nonacute. No mass or mass effect is evident.



IMPRESSION:



Unremarkable CT head.



Dictated by: 



  Dictated on workstation # [id number]

## 2017-12-21 NOTE — ED HEADACHE
General


Chief Complaint:  Head/Cervical Problems


Stated Complaint:  HEADACHE,DIZZINESS,15WKS PREG


Nursing Triage Note:  


Pt c/o HA, blurred vision, and nausea x 1 day.


Nursing Sepsis Screen:  No Definite Risk


Source:  patient


Exam Limitations:  no limitations





History of Present Illness


Time seen by provider:  20:37


Initial Comments


This 22-year-old young lady at approximately 15 weeks gestational age presents 

to the emergency room with intense left-sided headache and left-sided diplopia 

this started around 07:00.  She also complains of some disequilibrium when 

walking.  She did walk into the exam room on her own power.  She denies any 

history of similar headaches.  Headache actually started yesterday but worsened 

today.  The diplopia started this morning.  She took ibuprofen 200 mg at home 

which was not helpful.  She reports her pain is 9/10.  She has history of 

seizure disorder for which she takes Keppra.  Dr. Cooper is her primary care 

provider.





Allergies and Home Medications


Allergies


Coded Allergies:  


     No Known Drug Allergies (Unverified , 10/12/15)





Home Medications


Levetiracetam 500 Mg Tablet, 500 MG PO BID, #30


   Prescribed by: ARJUN KNOWLES on 1/10/17 0658





Constitutional:  no symptoms reported


Eyes:  See HPI


Ears, Nose, Mouth, Throat:  no symptoms reported


Respiratory:  no symptoms reported


Cardiovascular:  no symptoms reported


Gastrointestinal:  no symptoms reported


Genitourinary:  no symptoms reported


Pregnant:  Yes


Expected Date of Delivery:  2018


Musculoskeletal:  no symptoms reported


Skin:  no symptoms reported


Psychiatric/Neurological:  See HPI





Past Medical-Social-Family Hx


Patient Social History


Alcohol Use:  Denies Use


Recreational Drug Use:  No


Smoking Status:  Never a Smoker


2nd Hand Smoke Exposure:  No


Recent Foreign Travel:  No


Contact w/Someone Who Travel:  No


Recent Infectious Disease Expo:  No


Recent Hopitalizations:  No


Physical Abuse:  No


Sexual Abuse:  No


Mistreated:  No


Fear:  No





Immunizations Up To Date


Tetanus Booster (TDap):  Unknown





Seasonal Allergies


Seasonal Allergies:  No





Surgeries


History of Surgeries:  No





Respiratory


History of Respiratory Disorde:  No





Cardiovascular


History of Cardiac Disorders:  Yes


Cardiac Disorders:  Hypertension





Neurological


History of Neurological Disord:  No





Reproductive System


Pregnant:  Yes


Expected Date of Delivery:  2018


Hx :  4


Hx Para:  2


Hx Reproductive Disorders:  No





Genitourinary


History of Genitourinary Disor:  No





Gastrointestinal


History of Gastrointestinal Di:  No





Musculoskeletal


History of Musculoskeletal Dis:  No





Endocrine


History of Endocrine Disorders:  No





HEENT


History of HEENT Disorders:  No





Cancer


History of Cancer:  No





Psychosocial


History of Psychiatric Problem:  No


Suicide Risk Score:  1





Integumentary


History of Skin or Integumenta:  No





Blood Transfusions


Adverse Reaction to a Blood Tr:  No





Family Medical History


Significant Family History:  No Pertinent Family Hx


Family Medial History:  


Patient reports no known family medical history.





Physical Exam


Vital Signs





Vital Sign - Last 12Hours








 17





 20:15


 


Temp 98.1


 


Pulse 71


 


Resp 18


 


B/P (MAP) 144/94 (111)


 


Pulse Ox 97


 


O2 Delivery Room Air





Capillary Refill : Less Than 3 Seconds


General Appearance:  WD/WN, no apparent distress


HEENT:  PERRL/EOMI, normal ENT inspection, TMs normal, pharynx normal, other (

very poor dentition with gingival inflammation)


Neck:  normal inspection


Cardiovascular:  regular rate, rhythm, no edema, no murmur


Respiratory:  lungs clear, normal breath sounds, no respiratory distress, no 

accessory muscle use


Gastrointestinal:  normal bowel sounds, non tender, soft, other (appropriately 

gravid for gestational age)


Extremities:  normal inspection, no pedal edema


Psychiatric:  alert


Crainal Nerves:  normal hearing, normal speech, PERRL, other (states monocular 

left-sided diplopia.  Field of vision appears intact bilaterally when 

peripheral vision assessed.)


Coordination/Gait:  normal finger to nose, other (slight disequilibrium with 

gait prior to treatment)


Motor/Sensory:  no motor deficit, no sensory deficit


Skin:  normal color, warm/dry





Progress/Results/Core Measures


Results/Orders


Lab Results





Laboratory Tests








Test


  17


20:20 17


20:25 Range/Units


 


 


White Blood Count


  11.7 H


  


  4.3-11.0


10^3/uL


 


Red Blood Count


  4.01 L


  


  4.35-5.85


10^6/uL


 


Hemoglobin 12.3   11.5-16.0  G/DL


 


Hematocrit 35   35-52  %


 


Mean Corpuscular Volume 87   80-99  FL


 


Mean Corpuscular Hemoglobin 31   25-34  PG


 


Mean Corpuscular Hemoglobin


Concent 35 


  


  32-36  G/DL


 


 


Red Cell Distribution Width 13.3   10.0-14.5  %


 


Platelet Count


  245 


  


  130-400


10^3/uL


 


Mean Platelet Volume 10.9 H  7.4-10.4  FL


 


Neutrophils (%) (Auto) 68   42-75  %


 


Lymphocytes (%) (Auto) 23   12-44  %


 


Monocytes (%) (Auto) 8   0-12  %


 


Eosinophils (%) (Auto) 1   0-10  %


 


Basophils (%) (Auto) 0   0-10  %


 


Neutrophils # (Auto) 8.0 H  1.8-7.8  X 10^3


 


Lymphocytes # (Auto) 2.6   1.0-4.0  X 10^3


 


Monocytes # (Auto) 0.9   0.0-1.0  X 10^3


 


Eosinophils # (Auto)


  0.1 


  


  0.0-0.3


10^3/uL


 


Basophils # (Auto)


  0.0 


  


  0.0-0.1


10^3/uL


 


Sodium Level 138   135-145  MMOL/L


 


Potassium Level 3.6   3.6-5.0  MMOL/L


 


Chloride Level 107     MMOL/L


 


Carbon Dioxide Level 22   21-32  MMOL/L


 


Anion Gap 9   5-14  MMOL/L


 


Blood Urea Nitrogen 6 L  7-18  MG/DL


 


Creatinine


  0.55 L


  


  0.60-1.30


MG/DL


 


Estimat Glomerular Filtration


Rate > 60 


  


   


 


 


BUN/Creatinine Ratio 11    


 


Glucose Level 86     MG/DL


 


Calcium Level 8.5   8.5-10.1  MG/DL


 


Magnesium Level 1.7 L  1.8-2.4  MG/DL


 


Total Bilirubin 0.3   0.1-1.0  MG/DL


 


Aspartate Amino Transf


(AST/SGOT) 11 


  


  5-34  U/L


 


 


Alanine Aminotransferase


(ALT/SGPT) 7 


  


  0-55  U/L


 


 


Alkaline Phosphatase 37 L    U/L


 


Total Protein 6.7   6.4-8.2  GM/DL


 


Albumin 3.6   3.2-4.5  GM/DL


 


Serum Alcohol < 10   <10  MG/DL


 


Urine Color  YELLOW   


 


Urine Clarity  CLEAR   


 


Urine pH  8  5-9  


 


Urine Specific Gravity  1.010 L 1.016-1.022  


 


Urine Protein  NEGATIVE  NEGATIVE  


 


Urine Glucose (UA)  NEGATIVE  NEGATIVE  


 


Urine Ketones  NEGATIVE  NEGATIVE  


 


Urine Nitrite  NEGATIVE  NEGATIVE  


 


Urine Bilirubin  NEGATIVE  NEGATIVE  


 


Urine Urobilinogen  NORMAL  NORMAL  MG/DL


 


Urine Leukocyte Esterase  1+ H NEGATIVE  


 


Urine RBC (Auto)  NEGATIVE  NEGATIVE  


 


Urine RBC  NONE   /HPF


 


Urine WBC  0-2   /HPF


 


Urine Squamous Epithelial


Cells 


  2-5 


   /HPF


 


 


Urine Crystals  NONE   /LPF


 


Urine Bacteria  FEW H  /HPF


 


Urine Casts  NONE   /LPF


 


Urine Mucus  NEGATIVE   /LPF


 


Urine Culture Indicated  NO   


 


Urine Opiates Screen  NEGATIVE  NEGATIVE  


 


Urine Oxycodone Screen  NEGATIVE  NEGATIVE  


 


Urine Methadone Screen  NEGATIVE  NEGATIVE  


 


Urine Propoxyphene Screen  NEGATIVE  NEGATIVE  


 


Urine Barbiturates Screen  NEGATIVE  NEGATIVE  


 


Ur Tricyclic Antidepressants


Screen 


  NEGATIVE 


  NEGATIVE  


 


 


Urine Phencyclidine Screen  NEGATIVE  NEGATIVE  


 


Urine Amphetamines Screen  NEGATIVE  NEGATIVE  


 


Urine Methamphetamines Screen  NEGATIVE  NEGATIVE  


 


Urine Benzodiazepines Screen  NEGATIVE  NEGATIVE  


 


Urine Cocaine Screen  NEGATIVE  NEGATIVE  


 


Urine Cannabinoids Screen  NEGATIVE  NEGATIVE  








My Orders





Orders - ARJUN SUAREZ MD


Ct Head Wo (17 20:45)


Fentanyl  Injection (Sublimaze Injection (17 21:00)


Alcohol (17 20:49)


Cbc With Automated Diff (17 20:49)


Comprehensive Metabolic Panel (17 20:49)


Drug Screen Stat (Urine) (17 20:49)


Magnesium (17 20:49)


Ua Culture If Indicated (17 20:49)


Saline Lock/Iv-Start (17 20:49)


Monitor-Rhythm Ecg Trace Only (17 20:49)


Ketorolac Injection (Toradol Injection) (17 21:30)





Medications Given in ED





Current Medications








 Medications  Dose


 Ordered  Sig/Nestor


 Route  Start Time


 Stop Time Status Last Admin


Dose Admin


 


 Fentanyl Citrate  50 mcg  ONCE  ONCE


 IVP  17 21:00


 17 21:01 DC 17 21:01


50 MCG


 


 Ketorolac


 Tromethamine  30 mg  ONCE  ONCE


 IVP  17 21:30


 17 21:31 DC 17 21:24


30 MG








Vital Signs/I&O





Vital Sign - Last 12Hours








 17





 20:15


 


Temp 98.1


 


Pulse 71


 


Resp 18


 


B/P (MAP) 144/94 (111)


 


Pulse Ox 97


 


O2 Delivery Room Air














Blood Pressure Mean:  111








Progress Note :  


   Time:  21:52


Progress Note


Fentanyl did not seem to improve her pain.  Risks and benefits of CT 

examination reviewed with patient.  Workup was unremarkable including CT of the 

head which was performed with abdominal shielding.  Toradol was ordered as a 

second line of treatment.  This resolved her diplopia and improved her 

headache.  Case was reviewed with Dr. Cooper and Dr. Yuen (stroke neurologist at 

[facility]).  Both providers are okay with patient returning home.  Dr. Yuen 

recommends performing MR or CT venogram of the head if headache or rebounds to 

rule out venous thrombosis.  Patient was advised of this recommendation.  Dr. Cooper would like her to take only Tylenol for now for headache.  Dr. Yuen also 

recommended an eye exam.  I also addressed patient's poor dentition.  She 

states she has brushing her teeth and is scheduled to have dental extractions 

in the near future.





Diagnostic Imaging





   Diagonstic Imaging:  CT


   Plain Films/CT/US/NM/MRI:  head


Comments


CT head viewed by me and report reviewed.  See report below:





NAME:      NIKKI HUBBARD


MED REC#:   [id number]


ACCOUNT#:   [id number]


PT STATUS:   REG ER


:      1995


PHYSICIAN:    ARJUN SUAREZ MD


ADMIT DATE:   17/ER


***Signed***


Date of Exam:   17





CT HEAD WO





PROCEDURE: CT head without contrast.





TECHNIQUE: Multiple contiguous axial images were obtained through


the brain without the use of intravenous contrast.





INDICATION: Diplopia. The study compared 2017





FINDINGS:





There is no intracranial hemorrhage. There is no hydrocephalus.


No focal or generalized cerebral edema. The basilar cisterns are


patent, the sulci are non-effaced. The orbits, sinuses, and


calvarium appeared nonacute. No mass or mass effect is evident.





IMPRESSION:





Unremarkable CT head.





Dictated by: 





  Dictated on workstation # [id number]





[id number]





Dict:      17


Trans:      17





Interpreted by:         LYNDA LEON


Electronically signed by:   LYNDA LEON 17





Departure


Impression


Impression:  


 Primary Impression:  


 Left-sided headache


 Additional Impression:  


 Monocular diplopia


Disposition:   HOME, SELF-CARE


Condition:  Improved





Departure-Patient Inst.


Referrals:  


CANDELARIA COOPER MD (PCP/Family)


Primary Care Physician


Patient Instructions:  Headache, Adult (DC)





Add. Discharge Instructions:  


Drink plenty of clear liquids.  You may take Tylenol (acetaminophen) up to 650 

mg every 6 hours as needed for pain.  Follow-up with Dr. Cooper as soon as 

possible.  If headache worsens, you may need additional imaging of your head.  

It is also recommended that you see an eye doctor as soon as possible.  Call 

Dr. Cooper's office if you need a referral to an eye doctor.











All discharge instructions reviewed with patient and/or family. Voiced 

understanding.





Copy


Copies To 1:   CANDELARIA COOPER MD, JOSHUA T MD Dec 21, 2017 21:56

## 2018-01-12 ENCOUNTER — HOSPITAL ENCOUNTER (OUTPATIENT)
Dept: HOSPITAL 75 - RAD | Age: 23
End: 2018-01-12
Attending: FAMILY MEDICINE
Payer: MEDICAID

## 2018-01-12 DIAGNOSIS — Z3A.18: ICD-10-CM

## 2018-01-12 DIAGNOSIS — Z36.89: Primary | ICD-10-CM

## 2018-01-12 PROCEDURE — 76805 OB US >/= 14 WKS SNGL FETUS: CPT

## 2018-01-12 NOTE — DIAGNOSTIC IMAGING REPORT
INDICATION: Fetal anatomy survey.



TECHNIQUE: Multiple real-time grayscale images were obtained over

the gravid uterus.



COMPARISON: 10/30/2017.



FINDINGS: There is a single live intrauterine pregnancy in

variable presentation. The placenta is posteriorly located and is

low-lying located within 1.8 cm of the cervix. The amniotic fluid

appears visually appropriate. Fetal anatomy survey was performed

and the following structures are visualized and normal: Cisterna

magna, cerebellum, cerebral ventricles, four-chamber heart,

umbilical cord insertion, urinary bladder, three-vessel cord, and

spine.



Biometrical measurements are as follows:

Biparietal 3.72 cm, age 17 weeks 3 days.

Head circumference 15.06 cm, age 18 weeks 1 days.

Abdominal circumference 13.31 cm, age 18 weeks 6 days.

Femur length 2.98 cm, age 19 weeks 2 days.



Sonographic estimate age: 18 weeks 3 days.

Sonographic estimated date of delivery: 6/12/2018.



Estimated Fetal Weight: 261 gm (+/- 38  gm).

LMP percentile: 9%.



Fetal heart rate: 160 beats per minute.



Fetal number: 1 of 1.



IMPRESSION: 

1. Single live intrauterine pregnancy with normal fetal anatomy

survey.

2. Marginal placenta with the inferior aspect of the posteriorly

located placenta located 1.8 cm from the cervix.



Dictated by: 



  Dictated on workstation # [id number]

## 2018-05-05 ENCOUNTER — HOSPITAL ENCOUNTER (OUTPATIENT)
Dept: HOSPITAL 75 - WSO | Age: 23
Discharge: HOME | End: 2018-05-05
Attending: FAMILY MEDICINE
Payer: MEDICAID

## 2018-05-05 VITALS — BODY MASS INDEX: 21.26 KG/M2 | WEIGHT: 120 LBS | HEIGHT: 63 IN

## 2018-05-05 VITALS — DIASTOLIC BLOOD PRESSURE: 82 MMHG | SYSTOLIC BLOOD PRESSURE: 130 MMHG

## 2018-05-05 DIAGNOSIS — N85.9: ICD-10-CM

## 2018-05-05 DIAGNOSIS — O99.89: Primary | ICD-10-CM

## 2018-05-05 DIAGNOSIS — Z3A.34: ICD-10-CM

## 2018-05-05 PROCEDURE — 99213 OFFICE O/P EST LOW 20 MIN: CPT

## 2018-06-05 ENCOUNTER — HOSPITAL ENCOUNTER (OUTPATIENT)
Dept: HOSPITAL 75 - WSO | Age: 23
Discharge: HOME | End: 2018-06-05
Attending: FAMILY MEDICINE
Payer: MEDICAID

## 2018-06-05 VITALS — DIASTOLIC BLOOD PRESSURE: 96 MMHG | SYSTOLIC BLOOD PRESSURE: 158 MMHG

## 2018-06-05 VITALS — WEIGHT: 139.03 LBS | BODY MASS INDEX: 24.63 KG/M2 | HEIGHT: 63 IN

## 2018-06-05 DIAGNOSIS — Z3A.38: ICD-10-CM

## 2018-06-05 DIAGNOSIS — O99.89: Primary | ICD-10-CM

## 2018-06-05 DIAGNOSIS — N85.8: ICD-10-CM

## 2018-06-05 PROCEDURE — 99214 OFFICE O/P EST MOD 30 MIN: CPT

## 2018-06-06 NOTE — PHYSICIAN QUERY-FINAL DX
PAUL JEFFERSON 6/6/18 1723:


Clinic Account Progress/Dx


Physician Query:


Please give diagnosis


Date of Service





Jun 5, 2018 at 14:24





CANDELARIA COOPER MD 6/7/18 0726:


Clinic Account Progress/Dx


DIAGNOSIS:


Diagnosis


1.  IUP at 38 weeks, non labor


2.  Uterine irritability











PAUL JEFFERSON Jun 6, 2018 17:23


CANDELARIA COOPER MD Jun 7, 2018 07:26

## 2018-06-12 ENCOUNTER — HOSPITAL ENCOUNTER (INPATIENT)
Dept: HOSPITAL 75 - LDRP | Age: 23
LOS: 1 days | Discharge: HOME | End: 2018-06-13
Attending: FAMILY MEDICINE | Admitting: FAMILY MEDICINE
Payer: MEDICAID

## 2018-06-12 VITALS — DIASTOLIC BLOOD PRESSURE: 86 MMHG | SYSTOLIC BLOOD PRESSURE: 148 MMHG

## 2018-06-12 VITALS — SYSTOLIC BLOOD PRESSURE: 160 MMHG | DIASTOLIC BLOOD PRESSURE: 116 MMHG

## 2018-06-12 VITALS — DIASTOLIC BLOOD PRESSURE: 91 MMHG | SYSTOLIC BLOOD PRESSURE: 151 MMHG

## 2018-06-12 VITALS — SYSTOLIC BLOOD PRESSURE: 132 MMHG | DIASTOLIC BLOOD PRESSURE: 69 MMHG

## 2018-06-12 VITALS — SYSTOLIC BLOOD PRESSURE: 142 MMHG | DIASTOLIC BLOOD PRESSURE: 80 MMHG

## 2018-06-12 VITALS — DIASTOLIC BLOOD PRESSURE: 96 MMHG | SYSTOLIC BLOOD PRESSURE: 162 MMHG

## 2018-06-12 VITALS — SYSTOLIC BLOOD PRESSURE: 139 MMHG | DIASTOLIC BLOOD PRESSURE: 93 MMHG

## 2018-06-12 VITALS — DIASTOLIC BLOOD PRESSURE: 72 MMHG | SYSTOLIC BLOOD PRESSURE: 130 MMHG

## 2018-06-12 VITALS — DIASTOLIC BLOOD PRESSURE: 78 MMHG | SYSTOLIC BLOOD PRESSURE: 137 MMHG

## 2018-06-12 VITALS — SYSTOLIC BLOOD PRESSURE: 146 MMHG | DIASTOLIC BLOOD PRESSURE: 90 MMHG

## 2018-06-12 VITALS — DIASTOLIC BLOOD PRESSURE: 65 MMHG | SYSTOLIC BLOOD PRESSURE: 142 MMHG

## 2018-06-12 VITALS — SYSTOLIC BLOOD PRESSURE: 143 MMHG | DIASTOLIC BLOOD PRESSURE: 82 MMHG

## 2018-06-12 VITALS — DIASTOLIC BLOOD PRESSURE: 86 MMHG | SYSTOLIC BLOOD PRESSURE: 156 MMHG

## 2018-06-12 VITALS — DIASTOLIC BLOOD PRESSURE: 74 MMHG | SYSTOLIC BLOOD PRESSURE: 142 MMHG

## 2018-06-12 VITALS — DIASTOLIC BLOOD PRESSURE: 89 MMHG | SYSTOLIC BLOOD PRESSURE: 156 MMHG

## 2018-06-12 VITALS — SYSTOLIC BLOOD PRESSURE: 140 MMHG | DIASTOLIC BLOOD PRESSURE: 84 MMHG

## 2018-06-12 VITALS — DIASTOLIC BLOOD PRESSURE: 87 MMHG | SYSTOLIC BLOOD PRESSURE: 137 MMHG

## 2018-06-12 VITALS — DIASTOLIC BLOOD PRESSURE: 93 MMHG | SYSTOLIC BLOOD PRESSURE: 160 MMHG

## 2018-06-12 VITALS — SYSTOLIC BLOOD PRESSURE: 148 MMHG | DIASTOLIC BLOOD PRESSURE: 87 MMHG

## 2018-06-12 VITALS — DIASTOLIC BLOOD PRESSURE: 77 MMHG | SYSTOLIC BLOOD PRESSURE: 151 MMHG

## 2018-06-12 VITALS — DIASTOLIC BLOOD PRESSURE: 84 MMHG | SYSTOLIC BLOOD PRESSURE: 145 MMHG

## 2018-06-12 VITALS — WEIGHT: 139 LBS | BODY MASS INDEX: 24.63 KG/M2 | HEIGHT: 63 IN

## 2018-06-12 VITALS — DIASTOLIC BLOOD PRESSURE: 88 MMHG | SYSTOLIC BLOOD PRESSURE: 158 MMHG

## 2018-06-12 VITALS — DIASTOLIC BLOOD PRESSURE: 82 MMHG | SYSTOLIC BLOOD PRESSURE: 166 MMHG

## 2018-06-12 VITALS — DIASTOLIC BLOOD PRESSURE: 78 MMHG | SYSTOLIC BLOOD PRESSURE: 141 MMHG

## 2018-06-12 VITALS — DIASTOLIC BLOOD PRESSURE: 90 MMHG | SYSTOLIC BLOOD PRESSURE: 167 MMHG

## 2018-06-12 VITALS — DIASTOLIC BLOOD PRESSURE: 90 MMHG | SYSTOLIC BLOOD PRESSURE: 154 MMHG

## 2018-06-12 VITALS — SYSTOLIC BLOOD PRESSURE: 137 MMHG | DIASTOLIC BLOOD PRESSURE: 75 MMHG

## 2018-06-12 VITALS — SYSTOLIC BLOOD PRESSURE: 142 MMHG | DIASTOLIC BLOOD PRESSURE: 71 MMHG

## 2018-06-12 VITALS — SYSTOLIC BLOOD PRESSURE: 150 MMHG | DIASTOLIC BLOOD PRESSURE: 68 MMHG

## 2018-06-12 VITALS — SYSTOLIC BLOOD PRESSURE: 143 MMHG | DIASTOLIC BLOOD PRESSURE: 85 MMHG

## 2018-06-12 VITALS — DIASTOLIC BLOOD PRESSURE: 70 MMHG | SYSTOLIC BLOOD PRESSURE: 156 MMHG

## 2018-06-12 VITALS — SYSTOLIC BLOOD PRESSURE: 156 MMHG | DIASTOLIC BLOOD PRESSURE: 99 MMHG

## 2018-06-12 VITALS — DIASTOLIC BLOOD PRESSURE: 86 MMHG | SYSTOLIC BLOOD PRESSURE: 163 MMHG

## 2018-06-12 VITALS — DIASTOLIC BLOOD PRESSURE: 96 MMHG | SYSTOLIC BLOOD PRESSURE: 158 MMHG

## 2018-06-12 VITALS — SYSTOLIC BLOOD PRESSURE: 135 MMHG | DIASTOLIC BLOOD PRESSURE: 82 MMHG

## 2018-06-12 VITALS — SYSTOLIC BLOOD PRESSURE: 169 MMHG | DIASTOLIC BLOOD PRESSURE: 100 MMHG

## 2018-06-12 DIAGNOSIS — Z3A.39: ICD-10-CM

## 2018-06-12 LAB
BARBITURATES UR QL: NEGATIVE
BASOPHILS # BLD AUTO: 0 10^3/UL (ref 0–0.1)
BASOPHILS NFR BLD AUTO: 0 % (ref 0–10)
BENZODIAZ UR QL SCN: NEGATIVE
COCAINE UR QL: NEGATIVE
EOSINOPHIL # BLD AUTO: 0.1 10^3/UL (ref 0–0.3)
EOSINOPHIL NFR BLD AUTO: 1 % (ref 0–10)
ERYTHROCYTE [DISTWIDTH] IN BLOOD BY AUTOMATED COUNT: 15.1 % (ref 10–14.5)
HCT VFR BLD CALC: 31 % (ref 35–52)
HGB BLD-MCNC: 10 G/DL (ref 11.5–16)
LYMPHOCYTES # BLD AUTO: 1.9 X 10^3 (ref 1–4)
LYMPHOCYTES NFR BLD AUTO: 18 % (ref 12–44)
MANUAL DIFFERENTIAL PERFORMED BLD QL: NO
MCH RBC QN AUTO: 24 PG (ref 25–34)
MCHC RBC AUTO-ENTMCNC: 32 G/DL (ref 32–36)
MCV RBC AUTO: 75 FL (ref 80–99)
METHADONE UR QL SCN: NEGATIVE
METHAMPHETAMINE SCREEN URINE S: NEGATIVE
MONOCYTES # BLD AUTO: 0.8 X 10^3 (ref 0–1)
MONOCYTES NFR BLD AUTO: 8 % (ref 0–12)
NEUTROPHILS # BLD AUTO: 7.8 X 10^3 (ref 1.8–7.8)
NEUTROPHILS NFR BLD AUTO: 74 % (ref 42–75)
OPIATES UR QL SCN: NEGATIVE
OXYCODONE UR QL: NEGATIVE
PLATELET # BLD: 258 10^3/UL (ref 130–400)
PMV BLD AUTO: 11.3 FL (ref 7.4–10.4)
PROPOXYPH UR QL: NEGATIVE
RBC # BLD AUTO: 4.13 10^6/UL (ref 4.35–5.85)
TRICYCLICS UR QL SCN: NEGATIVE
WBC # BLD AUTO: 10.6 10^3/UL (ref 4.3–11)

## 2018-06-12 PROCEDURE — 3E033VJ INTRODUCTION OF OTHER HORMONE INTO PERIPHERAL VEIN, PERCUTANEOUS APPROACH: ICD-10-PCS | Performed by: FAMILY MEDICINE

## 2018-06-12 PROCEDURE — 36415 COLL VENOUS BLD VENIPUNCTURE: CPT

## 2018-06-12 PROCEDURE — 86850 RBC ANTIBODY SCREEN: CPT

## 2018-06-12 PROCEDURE — 85025 COMPLETE CBC W/AUTO DIFF WBC: CPT

## 2018-06-12 PROCEDURE — 86901 BLOOD TYPING SEROLOGIC RH(D): CPT

## 2018-06-12 PROCEDURE — 86900 BLOOD TYPING SEROLOGIC ABO: CPT

## 2018-06-12 PROCEDURE — 80306 DRUG TEST PRSMV INSTRMNT: CPT

## 2018-06-12 RX ADMIN — IBUPROFEN SCH MG: 600 TABLET ORAL at 18:43

## 2018-06-12 RX ADMIN — DOCUSATE SODIUM SCH MG: 100 CAPSULE ORAL at 21:12

## 2018-06-12 RX ADMIN — IBUPROFEN SCH MG: 600 TABLET ORAL at 10:52

## 2018-06-12 RX ADMIN — LEVETIRACETAM SCH MG: 500 TABLET, FILM COATED ORAL at 21:12

## 2018-06-12 NOTE — OB LABOR & DELIVERY RECORD
L&D History


Date of Service


Date of Service:  2018





History


Expected Date of Delivery:  2018


Gestational Age in Weeks:  39


Hx :  3


Hx Para:  2





Pregnancy Complications


Prenatal Events:  Routine Prenatal care


Operative Indications (Cesarea:  N/A-Vaginal Delivery


Intrapartal Events:  None





L&D Stage1


Stage One


Onset of Labor - Date:  2018


Onset of Labor - Time:  07:10





Monitors and Tracing


Fetal Monitor Mode:  Internal


Fetal Heart Rate:  125


Fetal Monitor Accelerations:  None


Fetal Monitor Decelerations:  Variable


Long Term Variability:  Average (6-10)


Short Term Variability:  Present


Fetal Presentation:  Vertex


Vital Signs





 VS - Last 72 Hours, by Label








 18





 06:29


 


Temp 97.3


 


Pulse 82


 


Resp 18


 


B/P (MAP) 148/87 (107)


 


O2 Delivery Room Air











Signs of Fetal Distress by FHT


Signs of Distress


no





Rupture of Membranes


Spontaneous Ruture of Membrane:  No


Amniotic Membrane Rupture Time:  07:10


Amniotic Membrane Fluid Desc.:  Clear





Induction/Anesthesia


Epidural Cath Placement - Time:  08:00





L&D Stage2


Stage Two


Stage II Date:  2018


Stage II Time:  07:38





Monitors and Tracing


Fetal Monitor Mode:  Internal


Fetal Heart Rate:  125


Fetal Monitor Accelerations:  Uniform


Fetal Monitor Decelerations:  Variable


Long Term Variability:  Average (6-10)


Short Term Variability:  Present


Fetal Position:  Left Occiput Anterior





Signs of Fetal Distress by FHT


Signs of Distress


no





Cord Descript/Complications


Fetal Cord Vessel Description:  3 Vessels





Delivery Type


Infant Delivery Method:  Spontaneous Vaginal


Anterior Shoulder:  Left





Episiotomy/Perineal Laceration


Laceraction(s)/Extensions:  No





Condition of Infant


Delivery


1 minute Apgar Comment:  


8


5 minute Apgar Comment:  


9





Condition of Infant


Condition of Infant:  Living


Exam:  No Observed Abnormalities





Resuscitation


Resuscitation:  N/A - Spontaneous Resp





L&D Stage3


Stage Three


Stage III Date:  2018


Stage III Time:  07:43





Pictocin


Pitocin ml/hr:  125





Placenta Delivery


Placenta Delivery:  Spontaneous





Delivery Summary


Summary


Estimated blood loss (mL):  200





Condition of Delivery


Examined:  Cervix Examined


Post Partum Hemorrhage:  No


Intervention Required


none











CANDELARIA COOPER MD 2018 10:58

## 2018-06-13 VITALS — SYSTOLIC BLOOD PRESSURE: 156 MMHG | DIASTOLIC BLOOD PRESSURE: 93 MMHG

## 2018-06-13 VITALS — SYSTOLIC BLOOD PRESSURE: 150 MMHG | DIASTOLIC BLOOD PRESSURE: 91 MMHG

## 2018-06-13 VITALS — SYSTOLIC BLOOD PRESSURE: 144 MMHG | DIASTOLIC BLOOD PRESSURE: 95 MMHG

## 2018-06-13 LAB
BASOPHILS # BLD AUTO: 0 10^3/UL (ref 0–0.1)
BASOPHILS NFR BLD AUTO: 0 % (ref 0–10)
EOSINOPHIL # BLD AUTO: 0.1 10^3/UL (ref 0–0.3)
EOSINOPHIL NFR BLD AUTO: 1 % (ref 0–10)
ERYTHROCYTE [DISTWIDTH] IN BLOOD BY AUTOMATED COUNT: 15.3 % (ref 10–14.5)
HCT VFR BLD CALC: 29 % (ref 35–52)
HGB BLD-MCNC: 8.9 G/DL (ref 11.5–16)
LYMPHOCYTES # BLD AUTO: 2.6 X 10^3 (ref 1–4)
LYMPHOCYTES NFR BLD AUTO: 22 % (ref 12–44)
MANUAL DIFFERENTIAL PERFORMED BLD QL: NO
MCH RBC QN AUTO: 24 PG (ref 25–34)
MCHC RBC AUTO-ENTMCNC: 31 G/DL (ref 32–36)
MCV RBC AUTO: 76 FL (ref 80–99)
MONOCYTES # BLD AUTO: 1 X 10^3 (ref 0–1)
MONOCYTES NFR BLD AUTO: 9 % (ref 0–12)
NEUTROPHILS # BLD AUTO: 7.9 X 10^3 (ref 1.8–7.8)
NEUTROPHILS NFR BLD AUTO: 68 % (ref 42–75)
PLATELET # BLD: 240 10^3/UL (ref 130–400)
PMV BLD AUTO: 11 FL (ref 7.4–10.4)
RBC # BLD AUTO: 3.74 10^6/UL (ref 4.35–5.85)
WBC # BLD AUTO: 11.6 10^3/UL (ref 4.3–11)

## 2018-06-13 RX ADMIN — LEVETIRACETAM SCH MG: 500 TABLET, FILM COATED ORAL at 09:33

## 2018-06-13 RX ADMIN — IBUPROFEN SCH MG: 600 TABLET ORAL at 00:55

## 2018-06-13 RX ADMIN — DOCUSATE SODIUM SCH MG: 100 CAPSULE ORAL at 09:33

## 2018-06-13 RX ADMIN — IBUPROFEN SCH MG: 600 TABLET ORAL at 06:33

## 2018-06-13 NOTE — DISCHARGE SUMMARY
Diagnosis/Chief Complaint


Date of Admission


2018 at 06:09


Date of Discharge


2018


Discharge Date:  2018


Discharge Time:  08:00


Admission Diagnosis


Admission Diagnosis


1.  Intrauterine pregnancy at 39 weeks gestation





Discharge Diagnosis


1.  Intrauterine pregnancy at 39 weeks gestation





Reason Hospital Visit


22-year-old female admitted on 2018 for induction of labor at 39 weeks 

gestation.  prenatal care was essentially unremarkable.  Her EDC is 2018.  Patient was induced at 39 weeks gestation due to favorable cervix.





Discharge Summary-OBS


Procedures


1.  Epidural, per anesthesia


2.  Spontaneous vaginal delivery


Discharge Physical Examination


Allergies:  


Coded Allergies:  


     No Known Drug Allergies (Unverified , 10/12/15)


Vitals & I&Os





Vital Signs








  Date Time  Temp Pulse Resp B/P (MAP) Pulse Ox O2 Delivery O2 Flow Rate FiO2


 


18 05:20 97.8 65 20 150/91 (110) 97   


 


18 00:45      Room Air  








General Appearance:  No Acute Distress


Respiratory:  Clear to Auscultation


Cardiovascular:  Regular Rate


Abdominal:  Soft (uterus firm)





Hospital Course


Following delivery patient underwent routine postpartum care orders.  She 

tolerated regular diet and regular activity.  She was not noted to be with any 

shortness of breath or any leg pain.  Her hemoglobin day after delivery was 8.9 

and his was compared to her initial lab of 10.0.  Patient was  ready for 

dismissal during the morning of 2018 in all questions were answered.


Pending Labs


Laboratory Tests


18 05:48: 


White Blood Count 11.6, Red Blood Count 3.74, Hemoglobin 8.9, Hematocrit 29, 

Mean Corpuscular Volume 76, Mean Corpuscular Hemoglobin 24, Mean Corpuscular 

Hemoglobin Concent 31, Red Cell Distribution Width 15.3, Platelet Count 240, 

Mean Platelet Volume 11.0, Neutrophils (%) (Auto) 68, Lymphocytes (%) (Auto) 22

, Monocytes (%) (Auto) 9, Eosinophils (%) (Auto) 1, Basophils (%) (Auto) 0, 

Neutrophils # (Auto) 7.9, Lymphocytes # (Auto) 2.6, Monocytes # (Auto) 1.0, 

Eosinophils # (Auto) 0.1, Basophils # (Auto) 0.0





Discharge


Instructions to patient/family


Please see electronic discharge instructions given to patient.


Discharge Medications


Reviewed and agree with Discharge Medication list on patient's Discharge 

Instruction sheet





Clinical Quality Measures


DVT/VTE Risk/Contraindication:


Risk Factor Score Per Nursin


RFS Level Per Nursing on Admit:  1=Low/No VTE PPX











CANDELARIA COOPER MD 2018 08:13

## 2018-06-13 NOTE — DISCHARGE INST-WOMEN'S SERVICE
Discharge Inst-Women's Serv


Depart Medication/Instructions


New, Converted or Re-Newed RX:  Transmitted to Pharmacy





Consults/Follow Up


Additional Follow Up:  Yes (Dr Cooper in 6 weeks)





Activity


Driving Instructions:  No Driving for 1 Week


Nothing Inside Vagina:  No [place] (for 6 weeks)





Diet


Discharge Diet:  No Restrictions


Return to The Hospital For:  


as below


Symptoms to Report to :  Bleeding Excessive, Pain Increased, Fever Over 101 

Degrees F, Vaginal Discharge Foul


For Any Problems or Questions:  Contact Your Physician











CANDELARIA COOPER MD Jun 13, 2018 08:16

## 2018-06-13 NOTE — ANESTHESIA-REGIONAL POST-OP
Regional


Patient Condition


Mental Status:  Alert, Oriented x3


Circulation:  Same as Pre-Op


Headache:  Absent


Sensation:  Full Recovery


Motor Block:  Absent





Post Op Complications


Complications


None





Follow Up Care/Instructions


Patient Instructions


None needed.





Anesthesia/Patient Condition


Patient is doing well, no complaints, stable vital signs, no apparent adverse 

anesthesia problems.   


No complications reported per nursing.











ADOLFO ARTHUR CRNA Jun 13, 2018 09:01

## 2018-12-19 ENCOUNTER — HOSPITAL ENCOUNTER (EMERGENCY)
Dept: HOSPITAL 75 - ER | Age: 23
Discharge: HOME | End: 2018-12-19
Payer: MEDICAID

## 2018-12-19 VITALS — DIASTOLIC BLOOD PRESSURE: 91 MMHG | SYSTOLIC BLOOD PRESSURE: 158 MMHG

## 2018-12-19 VITALS — HEIGHT: 63 IN | BODY MASS INDEX: 20.91 KG/M2 | WEIGHT: 118 LBS

## 2018-12-19 DIAGNOSIS — F17.200: ICD-10-CM
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
## 2018-12-19 DIAGNOSIS — S99.922A: Primary | ICD-10-CM

## 2018-12-19 DIAGNOSIS — R10.13: ICD-10-CM

## 2018-12-19 DIAGNOSIS — G40.909: ICD-10-CM

## 2018-12-19 DIAGNOSIS — E87.6: ICD-10-CM

## 2018-12-19 DIAGNOSIS — Z32.01: ICD-10-CM

## 2018-12-19 DIAGNOSIS — I10: ICD-10-CM

## 2018-12-19 DIAGNOSIS — R07.89: ICD-10-CM

## 2018-12-19 LAB
ALBUMIN SERPL-MCNC: 4.1 GM/DL (ref 3.2–4.5)
ALP SERPL-CCNC: 55 U/L (ref 40–136)
ALT SERPL-CCNC: 11 U/L (ref 0–55)
APTT BLD: 30 SEC (ref 24–35)
BARBITURATES UR QL: NEGATIVE
BASOPHILS # BLD AUTO: 0 10^3/UL (ref 0–0.1)
BASOPHILS NFR BLD AUTO: 0 % (ref 0–10)
BENZODIAZ UR QL SCN: NEGATIVE
BILIRUB SERPL-MCNC: 0.3 MG/DL (ref 0.1–1)
BUN/CREAT SERPL: 12
CALCIUM SERPL-MCNC: 9.4 MG/DL (ref 8.5–10.1)
CHLORIDE SERPL-SCNC: 107 MMOL/L (ref 98–107)
CO2 SERPL-SCNC: 22 MMOL/L (ref 21–32)
COCAINE UR QL: NEGATIVE
CREAT SERPL-MCNC: 0.65 MG/DL (ref 0.6–1.3)
EOSINOPHIL # BLD AUTO: 0.1 10^3/UL (ref 0–0.3)
EOSINOPHIL NFR BLD AUTO: 1 % (ref 0–10)
ERYTHROCYTE [DISTWIDTH] IN BLOOD BY AUTOMATED COUNT: 13.7 % (ref 10–14.5)
GFR SERPLBLD BASED ON 1.73 SQ M-ARVRAT: > 60 ML/MIN
GLUCOSE SERPL-MCNC: 77 MG/DL (ref 70–105)
HCT VFR BLD CALC: 36 % (ref 35–52)
HGB BLD-MCNC: 11.8 G/DL (ref 11.5–16)
INR PPP: 1.1 (ref 0.8–1.4)
LYMPHOCYTES # BLD AUTO: 2.2 X 10^3 (ref 1–4)
LYMPHOCYTES NFR BLD AUTO: 19 % (ref 12–44)
MAGNESIUM SERPL-MCNC: 2.1 MG/DL (ref 1.8–2.4)
MANUAL DIFFERENTIAL PERFORMED BLD QL: NO
MCH RBC QN AUTO: 28 PG (ref 25–34)
MCHC RBC AUTO-ENTMCNC: 33 G/DL (ref 32–36)
MCV RBC AUTO: 83 FL (ref 80–99)
METHADONE UR QL SCN: NEGATIVE
METHAMPHETAMINE SCREEN URINE S: NEGATIVE
MONOCYTES # BLD AUTO: 1.2 X 10^3 (ref 0–1)
MONOCYTES NFR BLD AUTO: 10 % (ref 0–12)
MYOGLOBIN SERPL-MCNC: 21.3 NG/ML (ref 10–92)
NEUTROPHILS # BLD AUTO: 8.2 X 10^3 (ref 1.8–7.8)
NEUTROPHILS NFR BLD AUTO: 70 % (ref 42–75)
OPIATES UR QL SCN: NEGATIVE
OXYCODONE UR QL: NEGATIVE
PLATELET # BLD: 326 10^3/UL (ref 130–400)
PMV BLD AUTO: 10.3 FL (ref 7.4–10.4)
POTASSIUM SERPL-SCNC: 3 MMOL/L (ref 3.6–5)
PROPOXYPH UR QL: NEGATIVE
PROT SERPL-MCNC: 7.1 GM/DL (ref 6.4–8.2)
PROTHROMBIN TIME: 13.8 SEC (ref 12.2–14.7)
RBC # BLD AUTO: 4.28 10^6/UL (ref 4.35–5.85)
SODIUM SERPL-SCNC: 138 MMOL/L (ref 135–145)
TRICYCLICS UR QL SCN: NEGATIVE
WBC # BLD AUTO: 11.7 10^3/UL (ref 4.3–11)

## 2018-12-19 PROCEDURE — 73630 X-RAY EXAM OF FOOT: CPT

## 2018-12-19 PROCEDURE — 93041 RHYTHM ECG TRACING: CPT

## 2018-12-19 PROCEDURE — 71045 X-RAY EXAM CHEST 1 VIEW: CPT

## 2018-12-19 PROCEDURE — 73610 X-RAY EXAM OF ANKLE: CPT

## 2018-12-19 PROCEDURE — 85025 COMPLETE CBC W/AUTO DIFF WBC: CPT

## 2018-12-19 PROCEDURE — 36415 COLL VENOUS BLD VENIPUNCTURE: CPT

## 2018-12-19 PROCEDURE — 84484 ASSAY OF TROPONIN QUANT: CPT

## 2018-12-19 PROCEDURE — 84703 CHORIONIC GONADOTROPIN ASSAY: CPT

## 2018-12-19 PROCEDURE — 83735 ASSAY OF MAGNESIUM: CPT

## 2018-12-19 PROCEDURE — 85379 FIBRIN DEGRADATION QUANT: CPT

## 2018-12-19 PROCEDURE — 80053 COMPREHEN METABOLIC PANEL: CPT

## 2018-12-19 PROCEDURE — 85730 THROMBOPLASTIN TIME PARTIAL: CPT

## 2018-12-19 PROCEDURE — 80306 DRUG TEST PRSMV INSTRMNT: CPT

## 2018-12-19 PROCEDURE — 85610 PROTHROMBIN TIME: CPT

## 2018-12-19 PROCEDURE — 83874 ASSAY OF MYOGLOBIN: CPT

## 2018-12-19 PROCEDURE — 83690 ASSAY OF LIPASE: CPT

## 2018-12-19 PROCEDURE — 93005 ELECTROCARDIOGRAM TRACING: CPT

## 2018-12-19 PROCEDURE — 96374 THER/PROPH/DIAG INJ IV PUSH: CPT

## 2018-12-19 NOTE — DIAGNOSTIC IMAGING REPORT
Clinical indication: Patient can't feel left leg, below the ankle

2 days ago and can't stand on it. Patient has pain on posterior

side of foot.



Exams:

1: X-ray of the left foot, 3 views.

2:  X-ray of the left ankle, 3 views.



Comparison: None.



Findings:

X-ray of the left foot and left ankle shows no acute fracture or

dislocation. There is no significant bone or joint abnormality.

Ankle mortise and syndesmotic joint are unremarkable.



Impression:

Unremarkable x-ray of the left foot and left ankle.



Dictated by: 



  Dictated on workstation # [id number]

## 2018-12-19 NOTE — DIAGNOSTIC IMAGING REPORT
Clinical indication: Patient can't feel left leg, below the ankle

2 days ago and can't stand on it. Patient has pain on posterior

side of foot.



Exams:

1: X-ray of the left foot, 3 views.

2:  X-ray of the left ankle, 3 views.



Comparison: None.



Findings:

X-ray of the left foot and left ankle shows no acute fracture or

dislocation. There is no significant bone or joint abnormality.

Ankle mortise and syndesmotic joint are unremarkable.



Impression:

Unremarkable x-ray of the left foot and left ankle.



Dictated by: 



  Dictated on workstation # [id number]
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
## 2019-01-08 ENCOUNTER — HOSPITAL ENCOUNTER (EMERGENCY)
Dept: HOSPITAL 75 - ER | Age: 24
Discharge: HOME | End: 2019-01-08
Payer: MEDICAID

## 2019-01-08 VITALS — DIASTOLIC BLOOD PRESSURE: 92 MMHG | SYSTOLIC BLOOD PRESSURE: 136 MMHG

## 2019-01-08 VITALS — WEIGHT: 118 LBS | HEIGHT: 63 IN | BODY MASS INDEX: 20.91 KG/M2

## 2019-01-08 DIAGNOSIS — Z3A.00: ICD-10-CM

## 2019-01-08 DIAGNOSIS — M43.6: ICD-10-CM

## 2019-01-08 DIAGNOSIS — O99.351: ICD-10-CM

## 2019-01-08 DIAGNOSIS — G40.909: ICD-10-CM

## 2019-01-08 DIAGNOSIS — O99.89: Primary | ICD-10-CM

## 2019-01-08 DIAGNOSIS — O16.1: ICD-10-CM

## 2019-01-08 PROCEDURE — 99284 EMERGENCY DEPT VISIT MOD MDM: CPT

## 2019-01-08 PROCEDURE — 96372 THER/PROPH/DIAG INJ SC/IM: CPT

## 2019-01-08 NOTE — XMS REPORT
Continuity of Care Document

 Created on: 2019



NIKKI HUBBARD

External Reference #: [id number]

: 1995

Sex: Female



Demographics







 Address  [address] [place]  [address]

 

 Home Phone  [phone] x

 

 Preferred Language  Unknown

 

 Marital Status  Unknown

 

 [employer] Affiliation  Unknown

 

 Race  Unknown

 

 Ethnic Group  Unknown





Author







 Author  [facility]

 

 Organization  [facility]

 

 Address  Unknown

 

 Phone  Unavailable



              



Allergies

      





 Active            Description            Code            Type            
Severity            Reaction            Onset            Reported/Identified   
         Relationship to Patient            Clinical Status        

 

 Yes            No Known Drug Allergies            [id number]            Drug 
Allergy            Unknown            N/A                         10/12/2015   
                               



                  



Medications

      



There is no data.                  



Problems

      





 Date Dx Coded            Attending            Type            Code            
Diagnosis            Diagnosed By        

 

 10/12/2015            DAYANA BLANKENSHIP            Ot            O23.41     
       UNSP INFCT OF URINARY TRACT IN PREGNANCY                     

 

 10/12/2015            DAYANA BLANKENSHIP            Ot            R11.2      
      NAUSEA WITH VOMITING, UNSPECIFIED                     

 

 10/12/2015            DAYANA BLANKENSHIP            Ot            Z3A.11     
       11 WEEKS GESTATION OF PREGNANCY                     

 

 2015            CANDELARIA COOPER MD, Ot            Z36         
                         

 

 2015            CANDELARIA COOPER MD, Ot            Z36         
                         

 

 2015            CANDELARIA COOPER MD, Ot            Z36         
                         

 

 01/15/2016            CANDELARIA COOPER MD, Ot            Z36         
                         

 

 2016            CANDELARIA COOPER MD, Ot            Z36         
                         

 

 04/15/2016            CANDELARIA COOPER MD, Ot            O44.13      
      PLACENTA PREVIA WITH HEMORRHAGE, THIRD T                     

 

 04/15/2016            CANDELARIA COOPER MD, Ot            Z3A.00      
      WEEKS OF GESTATION OF PREGNANCY NOT SPEC                     

 

 2016            DEDE PATRICK MD            Ot            O99.89     
       OTH DISEASES AND CONDITIONS COMPL PREG/C                     

 

 2016            DEDE PATRICK MD            Ot            R10.9      
      UNSPECIFIED ABDOMINAL PAIN                     

 

 2016            DEDE PATRICK MD            Ot            Z3A.37     
       37 WEEKS GESTATION OF PREGNANCY                     

 

 2016            CANDELARIA COOPER MD, Ot            O26.23      
      PREG CARE FOR PATIENT W RECURRENT PREG L                     

 

 2016            CANDELARIA COOPER MD, Ot            Z3A.32      
      32 WEEKS GESTATION OF PREGNANCY                     

 

 2016            CANDELARIA COOPER MD, Ot            O73.0       
     RETAINED PLACENTA WITHOUT HEMORRHAGE                     

 

 2016            CANDELARIA COOPER MD, Ot            R50.9       
     FEVER, UNSPECIFIED                     

 

 2016            CANDELARIA COOPER MD, Ot            Z23         
   ENCOUNTER FOR IMMUNIZATION                     

 

 2016            CANDELARIA COOPER MD, Ot            Z37.0       
     SINGLE LIVE BIRTH                     

 

 2016            CANDELARIA COOPER MD, Ot            Z3A.38      
      38 WEEKS GESTATION OF PREGNANCY                     

 

 07/10/2016            CANDELARIA COOPER MD, Ot            O26.23      
      PREG CARE FOR PATIENT W RECURRENT PREG L                     

 

 07/10/2016            CANDELARIA COOPER MD, Ot            Z3A.32      
      32 WEEKS GESTATION OF PREGNANCY                     

 

 2017            CANDELARIA COOPER MD, Ot            Z36         
   ENCOUNTER FOR  SCREENING OF MOT                     

 

 2017            CANDELARIA COOPER MD, Ot            O44.13      
      PLACENTA PREVIA WITH HEMORRHAGE, THIRD T                     

 

 2017            CANDELARIA COOPER MD, Ot            Z3A.00      
      WEEKS OF GESTATION OF PREGNANCY NOT SPEC                     

 

 2017            CANDELARIA COOPER MD, Ot            O26.23      
      PREG CARE FOR PATIENT W RECURRENT PREG L                     

 

 2017            CANDELARIA COOPER MD, Ot            Z3A.32      
      32 WEEKS GESTATION OF PREGNANCY                     

 

 2017            ARJUN SUAREZ MD            Ot            
G40.909            EPILEPSY, UNSP, NOT INTRACTABLE, WITHOUT                     

 

 2017            ARJUN SUAREZ MD            Ot            R56.9 
           UNSPECIFIED CONVULSIONS                     

 

 2017            ARJUN SUAREZ MD            Ot            
S40.011A            CONTUSION OF RIGHT SHOULDER, INITIAL ENC                   
  

 

 2017            ARJUN SUAREZ MD            Ot            
W18.30XA            FALL ON SAME LEVEL, UNSPECIFIED, INITIAL                   
  

 

 2017            ARJUN SUAREZ MD            Ot            
Y92.009            UNSP PLACE IN UNSP NON-INSTITUT (PRIVATE                     

 

 2017            ARJUN SUAREZ MD            Ot            Y99.8 
           OTHER EXTERNAL CAUSE STATUS                     

 

 2017            CANDELARIA COOPER MD, Ot            Z36         
   ENCOUNTER FOR  SCREENING OF MOT                     

 

 2017            CANDELARIA COOPER MD, Ot            O44.13      
      PLACENTA PREVIA WITH HEMORRHAGE, THIRD T                     

 

 2017            CANDELARIA COOPER MD, Ot            Z3A.00      
      WEEKS OF GESTATION OF PREGNANCY NOT SPEC                     

 

 2017            CANDELARIA COOPER MD, Ot            O26.23      
      PREG CARE FOR PATIENT W RECURRENT PREG L                     

 

 2017            CANDELARIA COOPER MD, Ot            Z3A.32      
      32 WEEKS GESTATION OF PREGNANCY                     

 

 2017            CANDELARIA COOPER MD, Ot            R56.9       
     UNSPECIFIED CONVULSIONS                     

 

 2017            CANDELARIA COOPER MD, Ot            R56.9       
     UNSPECIFIED CONVULSIONS                     

 

 10/26/2017            CANDELARIA COOPER MD, Ot            Z36         
   ENCOUNTER FOR  SCREENING OF MOT                     

 

 10/26/2017            CANDELARIA COOPER MD, Ot            O44.13      
      PLACENTA PREVIA WITH HEMORRHAGE, THIRD T                     

 

 10/26/2017            CANDELARIA COOPER MD, Ot            Z3A.00      
      WEEKS OF GESTATION OF PREGNANCY NOT SPEC                     

 

 10/26/2017            CANDELARIA COOPER MD, Ot            O26.23      
      PREG CARE FOR PATIENT W RECURRENT PREG L                     

 

 10/26/2017            CANDELARIA COOPER MD, Ot            Z3A.32      
      32 WEEKS GESTATION OF PREGNANCY                     

 

 10/26/2017            CANDELARIA COOPER MD, Ot            R56.9       
     UNSPECIFIED CONVULSIONS                     

 

 10/30/2017            CANDELARIA COOPER MD, Ot            Z36         
   ENCOUNTER FOR  SCREENING OF MOT                     

 

 10/30/2017            CANDELARIA COOPER MD, Ot            O44.13      
      PLACENTA PREVIA WITH HEMORRHAGE, THIRD T                     

 

 10/30/2017            CANDELARIA COOPER MD, Ot            Z3A.00      
      WEEKS OF GESTATION OF PREGNANCY NOT SPEC                     

 

 10/30/2017            CANDELARIA COOPER MD, Ot            O26.23      
      PREG CARE FOR PATIENT W RECURRENT PREG L                     

 

 10/30/2017            CANDELARIA COOPER MD, Ot            Z3A.32      
      32 WEEKS GESTATION OF PREGNANCY                     

 

 10/30/2017            CANDELARIA COOPER MD, Ot            R56.9       
     UNSPECIFIED CONVULSIONS                     

 

 2017            CANDELARIA COOPER MD, Ot            Z36.87      
      ENCOUNTER FOR  SCREENING FOR UN                     

 

 2017            CANDELARIA COOPER MD, Ot            Z3A.01      
      LESS THAN 8 WEEKS GESTATION OF PREGNANCY                     

 

 2017            ARJUN SUAREZ MD            Ot            
G40.909            EPILEPSY, UNSP, NOT INTRACTABLE, WITHOUT                     

 

 2017            ARJUN SUAREZ MD            Ot            H53.2 
           DIPLOPIA                     

 

 2017            ARJUN SUAREZ MD            Ot            I10   
         ESSENTIAL (PRIMARY) HYPERTENSION                     

 

 2017            ARJUN SUAREZ MD            Ot            O16.2 
           UNSPECIFIED MATERNAL HYPERTENSION, SECON                     

 

 2017            ARJUN SUAREZ MD            Ot            
O99.352            DISEASES OF THE NERVOUS SYS COMP PREGNAN                     

 

 2017            ARJUN SUAREZ MD            Ot            O99.89
            OTH DISEASES AND CONDITIONS COMPL PREG/C                     

 

 2017            ARJUN SUAREZ MD            Ot            R51   
         HEADACHE                     

 

 2017            ARJUN SUAREZ MD            Ot            Z3A.15
            15 WEEKS GESTATION OF PREGNANCY                     

 

 01/15/2018            CANDELARIA COOPER MD, Ot            Z36.89      
      ENCOUNTER FOR OTHER SPECIFIED                       

 

 01/15/2018            CANDELARIA COOPER MD, Ot            Z3A.18      
      18 WEEKS GESTATION OF PREGNANCY                     

 

 2018            CANDELARIA COOPER MD, Ot            Z36.89      
      ENCOUNTER FOR OTHER SPECIFIED                       

 

 2018            CANDELARIA COOPER MD, Ot            Z3A.18      
      18 WEEKS GESTATION OF PREGNANCY                     

 

 2018            CANDELARIA COOPER MD, Ot            N85.9       
     NONINFLAMMATORY DISORDER OF UTERUS, UNSP                     

 

 2018            CANDELARIA COOPER MD, Ot            O99.89      
      OTH DISEASES AND CONDITIONS COMPL PREG/C                     

 

 2018            CANDELARIA COOPER MD, Ot            Z3A.34      
      34 WEEKS GESTATION OF PREGNANCY                     

 

 2018            CANDELARIA COOPER MD, Ot            N85.8       
     OTHER SPECIFIED NONINFLAMMATORY DISORDER                     

 

 2018            CANDELARIA COOPER MD, Ot            O99.89      
      OTH DISEASES AND CONDITIONS COMPL PREG/C                     

 

 2018            CANDELARIA COOPER MD, Ot            Z3A.38      
      38 WEEKS GESTATION OF PREGNANCY                     

 

 2018            CANDELARIA COOPER MD, Ot            N85.8       
     OTHER SPECIFIED NONINFLAMMATORY DISORDER                     

 

 2018            CANDELARIA COOPER MD, Ot            O99.89      
      OTH DISEASES AND CONDITIONS COMPL PREG/C                     

 

 2018            CANDELARIA COOPER MD, Ot            Z3A.38      
      38 WEEKS GESTATION OF PREGNANCY                     

 

 2018            CANDELARIA COOPER MD, Ot            O80         
   ENCOUNTER FOR FULL-TERM UNCOMPLICATED DE                     

 

 2018            CANDELARIA COOPER MD            Ot            Z37.0       
     SINGLE LIVE BIRTH                     

 

 2018            CANDELARIA COOPER MD, Ot            Z3A.39      
      39 WEEKS GESTATION OF PREGNANCY                     

 

 2018            ARJUN SUAREZ MD            Ot            E87.6 
           HYPOKALEMIA                     

 

 2018            ARJUN SUAREZ MD            Ot            
F17.200            NICOTINE DEPENDENCE, UNSPECIFIED, UNCOMP                     

 

 2018            ARJUN SUAREZ MD, Ot            
G40.909            EPILEPSY, UNSP, NOT INTRACTABLE, WITHOUT                     

 

 2018            ARJUN SUAREZ MD            Ot            I10   
         ESSENTIAL (PRIMARY) HYPERTENSION                     

 

 2018            ARJUN SUAREZ MD            Ot            R07.89
            OTHER CHEST PAIN                     

 

 2018            ARJUN SUAREZ MD, Ot            R10.13
            EPIGASTRIC PAIN                     

 

 2018            ARJUN SUAREZ MD, Ot            
S99.922A            UNSPECIFIED INJURY OF LEFT FOOT, INITIAL                   
  

 

 2018            ARJUN SUAREZ MD, Ot            
X58.XXXA            EXPOSURE TO OTHER SPECIFIED FACTORS, INI                   
  

 

 2018            ARJUN SUAREZ MD, Ot            Z32.01
            ENCOUNTER FOR PREGNANCY TEST, RESULT POS                     



                                                                               
                                                                               
                                



Procedures

      





 Code            Description            Performed By            Performed On   
     

 

                                               EXTRACTION OF PRODUCTS OF 
CONCEPTION, RE                                   2016        

 

             83R6SOT                                  DELIVERY OF PRODUCTS OF 
CONCEPTION, EXTE                                   2016        

 

             93S5SEU                                  DELIVERY OF PRODUCTS OF 
CONCEPTION, EXTE                                   2018        

 

             [id number]                                  INTRODUCTION OF OTH 
HORMONE INTO PERIPH                                    2018        



                        



Results

      





 Test            Result            Range        









 Complete urinalysis with reflex to culture - 17 17:35         









 Urine color determination            YELLOW             NRG        

 

 Urine clarity determination            VERY CLOUDY             NRG        

 

 Urine pH measurement by test strip            8             5-9        

 

 Specific gravity of urine by test strip            1.015             1.016-
1.022        

 

 Urine protein assay by test strip, semi-quantitative            NEGATIVE      
       NEGATIVE        

 

 Urine glucose detection by automated test strip            NEGATIVE           
  NEGATIVE        

 

 Erythrocytes detection in urine sediment by light microscopy            
NEGATIVE             NEGATIVE        

 

 Urine ketones detection by automated test strip            NEGATIVE           
  NEGATIVE        

 

 Urine nitrite detection by test strip            NEGATIVE             NEGATIVE
        

 

 Urine total bilirubin detection by test strip            NEGATIVE             
NEGATIVE        

 

 Urine urobilinogen measurement by automated test strip (mass/volume)          
  NORMAL             NORMAL        

 

 Urine leukocyte esterase detection by dipstick            NEGATIVE             
NEGATIVE        

 

 Automated urine sediment erythrocyte count by microscopy (number/high power 
field)            NONE             NRG        

 

 Automated urine sediment leukocyte count by microscopy (number/high power field
)            NONE             NRG        

 

 Bacteria detection in urine sediment by light microscopy            FEW       
      NRG        

 

 Squamous epithelial cells detection in urine sediment by light microscopy     
       5-10             NRG        

 

 Crystals detection in urine sediment by light microscopy            PRESENT   
          NRG        

 

 Casts detection in urine sediment by light microscopy            NONE         
    NRG        

 

 Mucus detection in urine sediment by light microscopy            NEGATIVE     
        NRG        

 

 Complete urinalysis with reflex to culture            NO             NRG      
  

 

 Amorphous sediment detection in urine sediment by light microscopy            
MOD EDDIE PHOSPHATE             NRG        









 Urine drug screening test - 17 17:35         









 Urine phencyclidine detection by screening method            NEGATIVE         
    NEGATIVE        

 

 Urine benzodiazepines detection by screening method            NEGATIVE       
      NEGATIVE        

 

 Urine cocaine detection            NEGATIVE             NEGATIVE        

 

 Urine amphetamines detection by screening method            NEGATIVE          
   NEGATIVE        

 

 Urine methamphetamine detection by screening method            NEGATIVE       
      NEGATIVE        

 

 Urine cannabinoids detection by screening method            NEGATIVE          
   NEGATIVE        

 

 Urine opiates detection by screening method            NEGATIVE             
NEGATIVE        

 

 Urine barbiturates detection            NEGATIVE             NEGATIVE        

 

 Screening urine tricyclic antidepressants detection            NEGATIVE       
      NEGATIVE        

 

 Urine methadone detection by screening method            NEGATIVE             
NEGATIVE        

 

 Urine oxycodone detection            NEGATIVE             NEGATIVE        

 

 Urine propoxyphene detection            NEGATIVE             NEGATIVE        









 Complete blood count (CBC) with automated white blood cell (WBC) differential 
- 17 18:50         









 Blood leukocytes automated count (number/volume)            10.0 10*3/uL      
      4.3-11.0        

 

 Blood erythrocytes automated count (number/volume)            4.56 10*6/uL    
        4.35-5.85        

 

 Venous blood hemoglobin measurement (mass/volume)            12.9 g/dL        
    11.5-16.0        

 

 Blood hematocrit (volume fraction)            38 %            35-52        

 

 Automated erythrocyte mean corpuscular volume            83 [foz_us]          
  80-99        

 

 Automated erythrocyte mean corpuscular hemoglobin (mass per erythrocyte)      
      28 pg            25-34        

 

 Automated erythrocyte mean corpuscular hemoglobin concentration measurement (
mass/volume)            34 g/dL            32-36        

 

 Automated erythrocyte distribution width ratio            13.2 %            
10.0-14.5        

 

 Automated blood platelet count (count/volume)            264 10*3/uL          
  130-400        

 

 Automated blood platelet mean volume measurement            11.0 [foz_us]     
       7.4-10.4        

 

 Automated blood neutrophils/100 leukocytes            78 %            42-75   
     

 

 Automated blood lymphocytes/100 leukocytes            14 %            12-44   
     

 

 Blood monocytes/100 leukocytes            8 %            0-12        

 

 Automated blood eosinophils/100 leukocytes            1 %            0-10     
   

 

 Automated blood basophils/100 leukocytes            0 %            0-10        

 

 Blood neutrophils automated count (number/volume)            7.8 10*3         
   1.8-7.8        

 

 Blood lymphocytes automated count (number/volume)            1.4 10*3         
   1.0-4.0        

 

 Blood monocytes automated count (number/volume)            0.8 10*3            
0.0-1.0        

 

 Automated eosinophil count            0.1 10*3/uL            0.0-0.3        

 

 Automated blood basophil count (count/volume)            0.0 10*3/uL          
  0.0-0.1        









 Serum or plasma choriogonadotropin (pregnancy test) detection - 17 18:50
         









 Serum or plasma choriogonadotropin (pregnancy test) detection            
NEGATIVE             NEGATIVE        









 Comprehensive metabolic panel - 17 18:50         









 Serum or plasma sodium measurement (moles/volume)            138 mmol/L       
     135-145        

 

 Serum or plasma potassium measurement (moles/volume)            3.5 mmol/L    
        3.6-5.0        

 

 Serum or plasma chloride measurement (moles/volume)            108 mmol/L     
               

 

 Carbon dioxide            21 mmol/L            21-32        

 

 Serum or plasma anion gap determination (moles/volume)            9 mmol/L    
        5-14        

 

 Serum or plasma urea nitrogen measurement (mass/volume)            12 mg/dL   
         7-18        

 

 Serum or plasma creatinine measurement (mass/volume)            0.74 mg/dL    
        0.60-1.30        

 

 Serum or plasma urea nitrogen/creatinine mass ratio            16             
NRG        

 

 Serum or plasma creatinine measurement with calculation of estimated 
glomerular filtration rate            >             NRG        

 

 Serum or plasma glucose measurement (mass/volume)            95 mg/dL         
           

 

 Serum or plasma calcium measurement (mass/volume)            8.8 mg/dL        
    8.5-10.1        

 

 Serum or plasma total bilirubin measurement (mass/volume)            0.4 mg/dL
            0.1-1.0        

 

 Serum or plasma alkaline phosphatase measurement (enzymatic activity/volume)  
          37 U/L                    

 

 Serum or plasma aspartate aminotransferase measurement (enzymatic activity/
volume)            14 U/L            5-34        

 

 Serum or plasma alanine aminotransferase measurement (enzymatic activity/volume
)            13 U/L            0-55        

 

 Serum or plasma protein measurement (mass/volume)            7.0 g/dL         
   6.4-8.2        

 

 Serum or plasma albumin measurement (mass/volume)            4.2 g/dL         
   3.2-4.5        









 Magnesium - 17 18:50         









 Magnesium            2.2 mg/dL            1.8-2.4        









 Serum or plasma thyrotropin measurement by detection limit <=0.05 miu/l (units/
volume) - 17 18:50         









 Serum or plasma thyrotropin measurement by detection limit <=0.05 miu/l (units/
volume)            1.44 u[iU]/mL            0.35-4.94        









 Serum or plasma ethanol measurement (mass/volume) - 17 18:50         









 Serum or plasma ethanol measurement (mass/volume)            < mg/dL          
  <10        









 Complete blood count (CBC) with automated white blood cell (WBC) differential 
- 17 20:20         









 Blood leukocytes automated count (number/volume)            11.7 10*3/uL      
      4.3-11.0        

 

 Blood erythrocytes automated count (number/volume)            4.01 10*6/uL    
        4.35-5.85        

 

 Venous blood hemoglobin measurement (mass/volume)            12.3 g/dL        
    11.5-16.0        

 

 Blood hematocrit (volume fraction)            35 %            35-52        

 

 Automated erythrocyte mean corpuscular volume            87 [foz_us]          
  80-99        

 

 Automated erythrocyte mean corpuscular hemoglobin (mass per erythrocyte)      
      31 pg            25-34        

 

 Automated erythrocyte mean corpuscular hemoglobin concentration measurement (
mass/volume)            35 g/dL            32-36        

 

 Automated erythrocyte distribution width ratio            13.3 %            
10.0-14.5        

 

 Automated blood platelet count (count/volume)            245 10*3/uL          
  130-400        

 

 Automated blood platelet mean volume measurement            10.9 [foz_us]     
       7.4-10.4        

 

 Automated blood neutrophils/100 leukocytes            68 %            42-75   
     

 

 Automated blood lymphocytes/100 leukocytes            23 %            12-44   
     

 

 Blood monocytes/100 leukocytes            8 %            0-12        

 

 Automated blood eosinophils/100 leukocytes            1 %            0-10     
   

 

 Automated blood basophils/100 leukocytes            0 %            0-10        

 

 Blood neutrophils automated count (number/volume)            8.0 10*3         
   1.8-7.8        

 

 Blood lymphocytes automated count (number/volume)            2.6 10*3         
   1.0-4.0        

 

 Blood monocytes automated count (number/volume)            0.9 10*3            
0.0-1.0        

 

 Automated eosinophil count            0.1 10*3/uL            0.0-0.3        

 

 Automated blood basophil count (count/volume)            0.0 10*3/uL          
  0.0-0.1        









 Comprehensive metabolic panel - 17 20:20         









 Serum or plasma sodium measurement (moles/volume)            138 mmol/L       
     135-145        

 

 Serum or plasma potassium measurement (moles/volume)            3.6 mmol/L    
        3.6-5.0        

 

 Serum or plasma chloride measurement (moles/volume)            107 mmol/L     
               

 

 Carbon dioxide            22 mmol/L            21-32        

 

 Serum or plasma anion gap determination (moles/volume)            9 mmol/L    
        5-14        

 

 Serum or plasma urea nitrogen measurement (mass/volume)            6 mg/dL    
        7-18        

 

 Serum or plasma creatinine measurement (mass/volume)            0.55 mg/dL    
        0.60-1.30        

 

 Serum or plasma urea nitrogen/creatinine mass ratio            11             
NRG        

 

 Serum or plasma creatinine measurement with calculation of estimated 
glomerular filtration rate            >             NRG        

 

 Serum or plasma glucose measurement (mass/volume)            86 mg/dL         
           

 

 Serum or plasma calcium measurement (mass/volume)            8.5 mg/dL        
    8.5-10.1        

 

 Serum or plasma total bilirubin measurement (mass/volume)            0.3 mg/dL
            0.1-1.0        

 

 Serum or plasma alkaline phosphatase measurement (enzymatic activity/volume)  
          37 U/L                    

 

 Serum or plasma aspartate aminotransferase measurement (enzymatic activity/
volume)            11 U/L            5-34        

 

 Serum or plasma alanine aminotransferase measurement (enzymatic activity/volume
)            7 U/L            0-55        

 

 Serum or plasma protein measurement (mass/volume)            6.7 g/dL         
   6.4-8.2        

 

 Serum or plasma albumin measurement (mass/volume)            3.6 g/dL         
   3.2-4.5        









 Magnesium - 17 20:20         









 Magnesium            1.7 mg/dL            1.8-2.4        









 Serum or plasma ethanol measurement (mass/volume) - 17 20:20         









 Serum or plasma ethanol measurement (mass/volume)            < mg/dL          
  <10        









 Complete urinalysis with reflex to culture - 17 20:25         









 Urine color determination            YELLOW             NRG        

 

 Urine clarity determination            CLEAR             NRG        

 

 Urine pH measurement by test strip            8             5-9        

 

 Specific gravity of urine by test strip            1.010             1.016-
1.022        

 

 Urine protein assay by test strip, semi-quantitative            NEGATIVE      
       NEGATIVE        

 

 Urine glucose detection by automated test strip            NEGATIVE           
  NEGATIVE        

 

 Erythrocytes detection in urine sediment by light microscopy            
NEGATIVE             NEGATIVE        

 

 Urine ketones detection by automated test strip            NEGATIVE           
  NEGATIVE        

 

 Urine nitrite detection by test strip            NEGATIVE             NEGATIVE
        

 

 Urine total bilirubin detection by test strip            NEGATIVE             
NEGATIVE        

 

 Urine urobilinogen measurement by automated test strip (mass/volume)          
  NORMAL             NORMAL        

 

 Urine leukocyte esterase detection by dipstick            1+             
NEGATIVE        

 

 Automated urine sediment erythrocyte count by microscopy (number/high power 
field)            NONE             NRG        

 

 Automated urine sediment leukocyte count by microscopy (number/high power field
)             [HPF]            NRG        

 

 Bacteria detection in urine sediment by light microscopy            FEW       
      NRG        

 

 Squamous epithelial cells detection in urine sediment by light microscopy     
       2-5             NRG        

 

 Crystals detection in urine sediment by light microscopy            NONE      
       NRG        

 

 Casts detection in urine sediment by light microscopy            NONE         
    NRG        

 

 Mucus detection in urine sediment by light microscopy            NEGATIVE     
        NRG        

 

 Complete urinalysis with reflex to culture            NO             NRG      
  









 Urine drug screening test - 17 20:25         









 Urine phencyclidine detection by screening method            NEGATIVE         
    NEGATIVE        

 

 Urine benzodiazepines detection by screening method            NEGATIVE       
      NEGATIVE        

 

 Urine cocaine detection            NEGATIVE             NEGATIVE        

 

 Urine amphetamines detection by screening method            NEGATIVE          
   NEGATIVE        

 

 Urine methamphetamine detection by screening method            NEGATIVE       
      NEGATIVE        

 

 Urine cannabinoids detection by screening method            NEGATIVE          
   NEGATIVE        

 

 Urine opiates detection by screening method            NEGATIVE             
NEGATIVE        

 

 Urine barbiturates detection            NEGATIVE             NEGATIVE        

 

 Screening urine tricyclic antidepressants detection            NEGATIVE       
      NEGATIVE        

 

 Urine methadone detection by screening method            NEGATIVE             
NEGATIVE        

 

 Urine oxycodone detection            NEGATIVE             NEGATIVE        

 

 Urine propoxyphene detection            NEGATIVE             NEGATIVE        









 Complete blood count (CBC) with automated white blood cell (WBC) differential 
- 18 06:20         









 Blood leukocytes automated count (number/volume)            10.6 10*3/uL      
      4.3-11.0        

 

 Blood erythrocytes automated count (number/volume)            4.13 10*6/uL    
        4.35-5.85        

 

 Venous blood hemoglobin measurement (mass/volume)            10.0 g/dL        
    11.5-16.0        

 

 Blood hematocrit (volume fraction)            31 %            35-52        

 

 Automated erythrocyte mean corpuscular volume            75 [foz_us]          
  80-99        

 

 Automated erythrocyte mean corpuscular hemoglobin (mass per erythrocyte)      
      24 pg            25-34        

 

 Automated erythrocyte mean corpuscular hemoglobin concentration measurement (
mass/volume)            32 g/dL            32-36        

 

 Automated erythrocyte distribution width ratio            15.1 %            
10.0-14.5        

 

 Automated blood platelet count (count/volume)            258 10*3/uL          
  130-400        

 

 Automated blood platelet mean volume measurement            11.3 [foz_us]     
       7.4-10.4        

 

 Automated blood neutrophils/100 leukocytes            74 %            42-75   
     

 

 Automated blood lymphocytes/100 leukocytes            18 %            12-44   
     

 

 Blood monocytes/100 leukocytes            8 %            0-12        

 

 Automated blood eosinophils/100 leukocytes            1 %            0-10     
   

 

 Automated blood basophils/100 leukocytes            0 %            0-10        

 

 Blood neutrophils automated count (number/volume)            7.8 10*3         
   1.8-7.8        

 

 Blood lymphocytes automated count (number/volume)            1.9 10*3         
   1.0-4.0        

 

 Blood monocytes automated count (number/volume)            0.8 10*3            
0.0-1.0        

 

 Automated eosinophil count            0.1 10*3/uL            0.0-0.3        

 

 Automated blood basophil count (count/volume)            0.0 10*3/uL          
  0.0-0.1        









 Blood type T Indirect antibody screen panel - 18 06:20         









 ABO+Rh group            OP             NRG        

 

 Transfusion band number            [id number]             NRG        

 

 Blood group antibody screen            NEGATIVE             NRG        









 Urine drug screening test - 18 08:45         









 Urine phencyclidine detection by screening method            NEGATIVE         
    NEGATIVE        

 

 Urine benzodiazepines detection by screening method            NEGATIVE       
      NEGATIVE        

 

 Urine cocaine detection            NEGATIVE             NEGATIVE        

 

 Urine amphetamines detection by screening method            NEGATIVE          
   NEGATIVE        

 

 Urine methamphetamine detection by screening method            NEGATIVE       
      NEGATIVE        

 

 Urine cannabinoids detection by screening method            NEGATIVE          
   NEGATIVE        

 

 Urine opiates detection by screening method            NEGATIVE             
NEGATIVE        

 

 Urine barbiturates detection            NEGATIVE             NEGATIVE        

 

 Screening urine tricyclic antidepressants detection            NEGATIVE       
      NEGATIVE        

 

 Urine methadone detection by screening method            NEGATIVE             
NEGATIVE        

 

 Urine oxycodone detection            NEGATIVE             NEGATIVE        

 

 Urine propoxyphene detection            NEGATIVE             NEGATIVE        









 Complete blood count (CBC) with automated white blood cell (WBC) differential 
- 18 05:48         









 Blood leukocytes automated count (number/volume)            11.6 10*3/uL      
      4.3-11.0        

 

 Blood erythrocytes automated count (number/volume)            3.74 10*6/uL    
        4.35-5.85        

 

 Venous blood hemoglobin measurement (mass/volume)            8.9 g/dL         
   11.5-16.0        

 

 Blood hematocrit (volume fraction)            29 %            35-52        

 

 Automated erythrocyte mean corpuscular volume            76 [foz_us]          
  80-99        

 

 Automated erythrocyte mean corpuscular hemoglobin (mass per erythrocyte)      
      24 pg            25-34        

 

 Automated erythrocyte mean corpuscular hemoglobin concentration measurement (
mass/volume)            31 g/dL            32-36        

 

 Automated erythrocyte distribution width ratio            15.3 %            
10.0-14.5        

 

 Automated blood platelet count (count/volume)            240 10*3/uL          
  130-400        

 

 Automated blood platelet mean volume measurement            11.0 [foz_us]     
       7.4-10.4        

 

 Automated blood neutrophils/100 leukocytes            68 %            42-75   
     

 

 Automated blood lymphocytes/100 leukocytes            22 %            12-44   
     

 

 Blood monocytes/100 leukocytes            9 %            0-12        

 

 Automated blood eosinophils/100 leukocytes            1 %            0-10     
   

 

 Automated blood basophils/100 leukocytes            0 %            0-10        

 

 Blood neutrophils automated count (number/volume)            7.9 10*3         
   1.8-7.8        

 

 Blood lymphocytes automated count (number/volume)            2.6 10*3         
   1.0-4.0        

 

 Blood monocytes automated count (number/volume)            1.0 10*3            
0.0-1.0        

 

 Automated eosinophil count            0.1 10*3/uL            0.0-0.3        

 

 Automated blood basophil count (count/volume)            0.0 10*3/uL          
  0.0-0.1        









 Complete blood count (CBC) with automated white blood cell (WBC) differential 
- 18 18:07         









 Blood leukocytes automated count (number/volume)            11.7 10*3/uL      
      4.3-11.0        

 

 Blood erythrocytes automated count (number/volume)            4.28 10*6/uL    
        4.35-5.85        

 

 Venous blood hemoglobin measurement (mass/volume)            11.8 g/dL        
    11.5-16.0        

 

 Blood hematocrit (volume fraction)            36 %            35-52        

 

 Automated erythrocyte mean corpuscular volume            83 [foz_us]          
  80-99        

 

 Automated erythrocyte mean corpuscular hemoglobin (mass per erythrocyte)      
      28 pg            25-34        

 

 Automated erythrocyte mean corpuscular hemoglobin concentration measurement (
mass/volume)            33 g/dL            32-36        

 

 Automated erythrocyte distribution width ratio            13.7 %            
10.0-14.5        

 

 Automated blood platelet count (count/volume)            326 10*3/uL          
  130-400        

 

 Automated blood platelet mean volume measurement            10.3 [foz_us]     
       7.4-10.4        

 

 Automated blood neutrophils/100 leukocytes            70 %            42-75   
     

 

 Automated blood lymphocytes/100 leukocytes            19 %            12-44   
     

 

 Blood monocytes/100 leukocytes            10 %            0-12        

 

 Automated blood eosinophils/100 leukocytes            1 %            0-10     
   

 

 Automated blood basophils/100 leukocytes            0 %            0-10        

 

 Blood neutrophils automated count (number/volume)            8.2 10*3         
   1.8-7.8        

 

 Blood lymphocytes automated count (number/volume)            2.2 10*3         
   1.0-4.0        

 

 Blood monocytes automated count (number/volume)            1.2 10*3            
0.0-1.0        

 

 Automated eosinophil count            0.1 10*3/uL            0.0-0.3        

 

 Automated blood basophil count (count/volume)            0.0 10*3/uL          
  0.0-0.1        









 Serum or plasma choriogonadotropin (pregnancy test) detection - 18 18:07
         









 Serum or plasma choriogonadotropin (pregnancy test) detection            
POSITIVE             NEGATIVE        









 Comprehensive metabolic panel - 18 18:07         









 Serum or plasma sodium measurement (moles/volume)            138 mmol/L       
     135-145        

 

 Serum or plasma potassium measurement (moles/volume)            3.0 mmol/L    
        3.6-5.0        

 

 Serum or plasma chloride measurement (moles/volume)            107 mmol/L     
               

 

 Carbon dioxide            22 mmol/L            21-32        

 

 Serum or plasma anion gap determination (moles/volume)            9 mmol/L    
        5-14        

 

 Serum or plasma urea nitrogen measurement (mass/volume)            8 mg/dL    
        7-18        

 

 Serum or plasma creatinine measurement (mass/volume)            0.65 mg/dL    
        0.60-1.30        

 

 Serum or plasma urea nitrogen/creatinine mass ratio            12             
NRG        

 

 Serum or plasma creatinine measurement with calculation of estimated 
glomerular filtration rate            >             NRG        

 

 Serum or plasma glucose measurement (mass/volume)            77 mg/dL         
           

 

 Serum or plasma calcium measurement (mass/volume)            9.4 mg/dL        
    8.5-10.1        

 

 Serum or plasma total bilirubin measurement (mass/volume)            0.3 mg/dL
            0.1-1.0        

 

 Serum or plasma alkaline phosphatase measurement (enzymatic activity/volume)  
          55 U/L                    

 

 Serum or plasma aspartate aminotransferase measurement (enzymatic activity/
volume)            12 U/L            5-34        

 

 Serum or plasma alanine aminotransferase measurement (enzymatic activity/volume
)            11 U/L            0-55        

 

 Serum or plasma protein measurement (mass/volume)            7.1 g/dL         
   6.4-8.2        

 

 Serum or plasma albumin measurement (mass/volume)            4.1 g/dL         
   3.2-4.5        

 

 CALCIUM CORRECTED            9.3 mg/dL            8.5-10.1        









 Magnesium - 18 18:07         









 Magnesium            2.1 mg/dL            1.8-2.4        









 PT panel in platelet poor plasma by coagulation assay - 18 18:07         









 Prothrombin time (PT) in platelet poor plasma by coagulation assay            
13.8 s            12.2-14.7        

 

 INR in platelet poor plasma or blood by coagulation assay            1.1      
       0.8-1.4        









 Activated partial thromboplastin time (aPTT) in platelet poor plasma 
bycoagulation assay - 18 18:07         









 Activated partial thromboplastin time (aPTT) in platelet poor plasma 
bycoagulation assay            30 s            24-35        









 Fibrin D-dimer FEU measurement in platelet poor plasma (mass/volume) -  18:07         









 Fibrin D-dimer FEU measurement in platelet poor plasma (mass/volume)          
  0.24 ug/mL            0.00-0.49        









 Serum or plasma troponin i.cardiac measurement (mass/volume) - 18 18:07 
        









 Serum or plasma troponin i.cardiac measurement (mass/volume)            < ng/
mL            <0.30        









 Myoglobin, serum - 18 18:07         









 Myoglobin, serum            21.3 ng/mL            10.0-92.0        









 Lipase - 18 18:07         









 Lipase            38 U/L            8-78        









 Urine drug screening test - 18 18:15         









 Urine phencyclidine detection by screening method            NEGATIVE         
    NEGATIVE        

 

 Urine benzodiazepines detection by screening method            NEGATIVE       
      NEGATIVE        

 

 Urine cocaine detection            NEGATIVE             NEGATIVE        

 

 Urine amphetamines detection by screening method            POSITIVE          
   NEGATIVE        

 

 Urine methamphetamine detection by screening method            NEGATIVE       
      NEGATIVE        

 

 Urine cannabinoids detection by screening method            NEGATIVE          
   NEGATIVE        

 

 Urine opiates detection by screening method            NEGATIVE             
NEGATIVE        

 

 Urine barbiturates detection            NEGATIVE             NEGATIVE        

 

 Screening urine tricyclic antidepressants detection            NEGATIVE       
      NEGATIVE        

 

 Urine methadone detection by screening method            NEGATIVE             
NEGATIVE        

 

 Urine oxycodone detection            NEGATIVE             NEGATIVE        

 

 Urine propoxyphene detection            NEGATIVE             NEGATIVE        



                                                                        



Encounters

      





 ACCT No.            Visit Date/Time            Discharge            Status    
        Pt. Type            Provider            Facility            Loc./Unit  
          Complaint        

 

 [id number]            2018 12:24:00            2018 23:59:59    
        CLS            Preadmit            CANDELARIA COOPER MD            [facility]            RAD            FETAL DATES        

 

 [id number]            2018 17:46:00            2018 20:02:00    
        DIS            Outpatient            DANIELA DOMINIQUE, ARJUN MOYA          
  [facility]            ER            CHEST HURTS,DIZZY,
CANT FEEL L LEG        

 

 [id number]            2018 06:09:00            2018 13:00:00    
        DIS            Inpatient            CANDELARIA COOPER MD            [facility]            LDRP            INDUCTION        

 

 [id number]            2018 14:24:00            2018 17:00:00    
        DIS            Outpatient            CANDELARIA COOPER MD            [facility]            WSo            STOMACH PRESSURE,BACK 
PAIN,NAUSEA        

 

 [id number]            2018 14:38:00            2018 15:45:00    
        DIS            Outpatient            CANDELARIA COOPER MD            [facility]            STOMACH PRESSURE/BACK 
PAIN        

 

 [id number]            2018 13:07:00            2018 23:59:59    
        CLS            Outpatient            CANDELARIA COOPER MD            [facility]            RAD            FETAL SURVEY        

 

 [id number]            2017 20:10:00            2017 22:19:00    
        DIS            Emergency            ARJUN SUAREZ MD            
[facility]            ER            HEADACHE,DIZZINESS,
15WKS PREG        

 

 [id number]            10/30/2017 13:25:00            10/30/2017 23:59:59    
        CLS            Outpatient            CANDELARIA COOPER MD            [facility]            RAD            DATING FETAL        

 

 [id number]            2017 07:55:00            2017 23:59:59    
        CLS            Outpatient            CANDELARIA COOPER MD            [facility]            RT            SEIZURE        

 

 [id number]            2017 17:05:00            2017 21:18:00    
        DIS            Emergency            ARJUN SUAREZ MD            
[facility]            ER            SEIZURE        

 

 [id number]            2016 00:10:00            2016 23:59:59    
        CLS            Preadmit            CANDELARIA COOPER MD            [facility]            WSo            PREVIOUS FETAL DEMISE   
     

 

 [id number]            2016 15:29:00            07/10/2016 00:01:00    
        DIS            Outpatient            CANDELARIA COOPER MD            [facility]            WSo            PREVIOUS FETAL DEMISE   
     

 

 [id number]            2016 14:28:00            2016 14:15:00    
        DIS            Inpatient            CANDELARIA COOPER MD            [facility]            LDRP            LABOR        

 

 [id number]            2016 21:48:00            2016 23:30:00    
        DIS            Outpatient            DEDE PATRICK MD            [facility]            ABD PAIN        

 

 [id number]            2016 13:45:00            2016 23:59:59    
        CLS            Outpatient            CANDELARIA COOPER MD            [facility]            RAD            PLACENTA PREVIA        

 

 [id number]            2015 10:13:00            2015 23:59:59    
        CLS            Outpatient            CANDELARIA COOPER MD            [facility]            RAD            FETAL SURVEY        

 

 [id number]            10/12/2015 18:34:00            10/12/2015 20:28:00    
        DIS            Emergency            DAYANA BLANKENSHIP            [facility]            ER            NAUSEA/VOMITING @ 11 
WEEKS        

 

 [id number]            2018 15:00:00            2018 23:59:59          
  CLS            Outpatient            [facility]ARD[facility]ORA LAC                       
  [facility]

## 2019-01-08 NOTE — ED GENERAL
General


Chief Complaint:  General Problems/Pain


Stated Complaint:  PAIN IN SIDE OF NECK


Nursing Triage Note:  


Ambulatory to triage.  Pt c/o sharp pain in the L side of neck that began this 


AM.  Pt reports HA across forehead last night.  Pt also c/o dizziness, 


lightheadedness, and nausea after meals.  Pt reports taking four tylenol today 


with no relief.  Tylenol last taken approximately one hour ago.  Pt reports 


being two months pregnant.


Nursing Sepsis Screen:  No Definite Risk


Source of Information:  Patient


Exam Limitations:  No Limitations





History of Present Illness


Date Seen by Provider:  Jan 8, 2019


Time Seen by Provider:  18:22


Initial Comments


To ER with reports of sharp left sided posterior lateral neck pain that she 

awakened with this morning. Last night she had a headache but no neck pain. 

Today the headache is gone but the neck pain is there. She states that she's 

had for Tylenol today without relief. She is 2 months pregnant. No fevers or 

chills. No cough or sore throat.


Timing/Duration:  1-2 Days


Severity:  Moderate


Associated Systoms:  Cough





Allergies and Home Medications


Allergies


Coded Allergies:  


     No Known Drug Allergies (Unverified , 10/12/15)





Home Medications


Ibuprofen 600 Mg Tablet, 600 MG PO Q6H


   Prescribed by: CANDELARIA COOPER on 6/13/18 0815


Levetiracetam 500 Mg Tablet, 500 MG PO BID


   Prescribed by: ARJUN KNOWLES on 1/10/17 0658





Patient Home Medication List


Home Medication List Reviewed:  Yes





Review of Systems


Review of Systems


Constitutional:  see HPI


EENTM:  see HPI


Respiratory:  no symptoms reported


Genitourinary:  no symptoms reported


Musculoskeletal:  see HPI, neck pain


Skin:  no symptoms reported


Psychiatric/Neurological:  No Symptoms Reported


Hematologic/Lymphatic:  No Symptoms Reported


Immunological/Allergic:  no symptoms reported (I)





Past Medical-Social-Family Hx


Patient Social History


Alcohol Use:  Denies Use


Recreational Drug Use:  No


Smoking Status:  Never a Smoker


2nd Hand Smoke Exposure:  No


Recent Foreign Travel:  No


Contact w/Someone Who Travel:  No


Recent Infectious Disease Expo:  No


Recent Hopitalizations:  No


Physical Abuse:  No


Sexual Abuse:  No





Immunizations Up To Date


Tetanus Booster (TDap):  Unknown





Seasonal Allergies


Seasonal Allergies:  No





Past Medical History


Surgeries:  No


Respiratory:  No


Cardiac:  Yes


Hypertension


Neurological:  Yes


Seizure Disorder


Pregnant:  Yes


Last Menstrual Period:  Oct 1, 2018


Reproductive Disorders:  No


Genitourinary:  No


Gastrointestinal:  No


Musculoskeletal:  No


Endocrine:  No


HEENT:  No


Cancer:  No


Psychosocial:  No


Integumentary:  No


Blood Disorders:  No


Adverse Reaction/Blood Tranf:  No





Family Medical History





Patient reports no known family medical history.


No Pertinent Family Hx





Physical Exam


Vital Signs





Vital Signs - First Documented








 1/8/19





 18:04


 


Temp 98.3


 


Pulse 85


 


Resp 12


 


B/P (MAP) 136/92 (107)


 


Pulse Ox 100


 


O2 Delivery Room Air





Capillary Refill : Less Than 3 Seconds


Height, Weight, BMI


Height: 5'3.00"


Weight: 118lbs. 0.0oz. 53.985952is; 24.6 BMI


Method:Stated


General Appearance:  No Apparent Distress, WD/WN


Eyes:  Bilateral Eye Normal Inspection, Bilateral Eye PERRL, Bilateral Eye EOMI


HEENT:  PERRL/EOMI, TMs Normal, Other (poor dentition and gingival hyperplasia 

noted. Patient reports a past history of Dilantin use for seizure disorder)


Neck:  Full Range of Motion, Normal Inspection; No Lymphadenopathy (L), No 

Lymphadenopathy (R); Other (no palpable masses posterior auricular or anterior 

or cervical chain adenopathy.)


Respiratory:  Normal Breath Sounds, No Accessory Muscle Use, No Respiratory 

Distress


Cardiovascular:  Regular Rate, Rhythm, Normal Peripheral Pulses


Gastrointestinal:  Normal Bowel Sounds, Non Tender, Soft


Extremity:  Normal Capillary Refill, Normal Inspection


Neurologic/Psychiatric:  Alert, Oriented x3


Skin:  Normal Color, Warm/Dry





Progress/Results/Core Measures


Suspected Sepsis


Recent Fever Within 48 Hours:  No


Infection Criteria Present:  None


New/Unexplained  Altered Menta:  No


Sepsis Screen:  No Definite Risk


SIRS


Temperature:98.3 


Pulse: 85 


Respiratory Rate: 12


 


Blood Pressure 136 /92 


Mean: 107





Results/Orders


My Orders





Orders - DAYANA BLANKENSHIP


Orphenadrine Injection (Norflex Injectio (1/8/19 18:15)





Medications Given in ED





Current Medications








 Medications  Dose


 Ordered  Sig/Nestor


 Route  Start Time


 Stop Time Status Last Admin


Dose Admin


 


 Orphenadrine


 Citrate  60 mg  ONCE  ONCE


 IM  1/8/19 18:15


 1/8/19 18:16 DC 1/8/19 18:26


60 MG








Vital Signs/I&O











 1/8/19





 18:04


 


Temp 98.3


 


Pulse 85


 


Resp 12


 


B/P (MAP) 136/92 (107)


 


Pulse Ox 100


 


O2 Delivery Room Air





Capillary Refill : Less Than 3 Seconds








Blood Pressure Mean:  107











Departure


Impression





 Primary Impression:  


 Torticollis


Disposition:  01 HOME, SELF-CARE


Condition:  Stable





Departure-Patient Inst.


Decision time for Depature:  18:26


Referrals:  


CANDELARIA COOPER MD (PCP/Family)


Primary Care Physician


Patient Instructions:  Torticollis, Adult





Add. Discharge Instructions:  


1. Warm compresses to her neck. Muscle relaxers as directed. Follow-up with 

your doctor later this week. All discharge instructions reviewed with patient 

and/or family. Voiced understanding.


Scripts


Cyclobenzaprine HCl (Cyclobenzaprine HCl) 5 Mg Tablet


5 MG PO TID PRN for PAIN-MODERATE, #14 TAB


   Prov: DAYANA BLANKENSHIP         1/8/19











DAYANA BLANKENSHIP Jan 8, 2019 18:24

## 2019-01-08 NOTE — XMS REPORT
[facility]

 Created on: 2018



Prerna Massey

External Reference #: [id number]

: 1995

Sex: Female



Demographics







 Address  [address] [place]  [address]

 

 Preferred Language  Unknown

 

 Marital Status  Unknown

 

 Latter-day Affiliation  Unknown

 

 Race  Unknown

 

 Ethnic Group  Unknown





Author







 Author  CANDELARIA COOPER

 

 Organization  [facility]

 

 Address  [address] [place]  [address]



 

 Phone  [phone]







Care Team Providers







 Care Team Member Name  Role  Phone

 

 CANDELARIA COOPER  Unavailable  [phone]







PROBLEMS

Unknown Problems



ALLERGIES

No Information



ENCOUNTERS







 Encounter  Location  Date  Diagnosis

 

 [facility]  [address] [facility] [id number] 
[id number]  13 Dec, 2018   

 

 [facility]  [address] [place] [id number]  04 Dec, 2018   

 

 [facility]  [id number] [address]  04 Dec, 2018   







IMMUNIZATIONS

No Known Immunizations



SOCIAL HISTORY

Never Assessed



REASON FOR VISIT





PLAN OF CARE





VITAL SIGNS





MEDICATIONS

Unknown Medications



RESULTS

No Results



PROCEDURES

No Known procedures



INSTRUCTIONS





MEDICATIONS ADMINISTERED

No Known Medications

## 2019-01-18 ENCOUNTER — HOSPITAL ENCOUNTER (OUTPATIENT)
Dept: HOSPITAL 75 - RAD | Age: 24
End: 2019-01-18
Attending: FAMILY MEDICINE
Payer: MEDICAID

## 2019-01-18 DIAGNOSIS — Z3A.14: ICD-10-CM

## 2019-01-18 DIAGNOSIS — Z34.92: Primary | ICD-10-CM

## 2019-01-18 PROCEDURE — 76805 OB US >/= 14 WKS SNGL FETUS: CPT

## 2019-01-18 NOTE — DIAGNOSTIC IMAGING REPORT
INDICATION: Fetal dating.



TECHNIQUE: Multiple real-time grayscale images were obtained over

the gravid uterus.



COMPARISON: None



FINDINGS: There is a single live fetus in cephalic presentation.

Fetal heart rate was recorded at 150 beats per minute. Placenta

is anterior. Amniotic fluid volume is normal. Cervical length is

5.4 cm.



Biometrical measurements are as follows:

Biparietal 2.70 cm, age 14 weeks 6 days.

Head circumference 10.26 cm, age 14 weeks 6 days.

Abdominal circumference 8.54 cm, age 14 weeks 6 days.

Femur length 1.32 cm, age 14 weeks 0 days.



Sonographic estimate age: 14 weeks 5 days.

Sonographic estimated date of delivery: 07/14/2019.



Estimated Fetal Weight: 97 gm (+/- 14  gm).

LMP percentile: 9%.



Fetal heart rate: 150 beats per minute.



Fetal number: 1 of 1.



IMPRESSION: Single live IUP 14 weeks 5 days gestational age. The

estimated date of confinement sonographically is 07/14/2019. 



Dictated by: 



  Dictated on workstation # [id number]

## 2019-02-12 ENCOUNTER — HOSPITAL ENCOUNTER (OUTPATIENT)
Dept: HOSPITAL 75 - RAD | Age: 24
End: 2019-02-12
Attending: FAMILY MEDICINE
Payer: MEDICAID

## 2019-02-12 DIAGNOSIS — Z3A.18: ICD-10-CM

## 2019-02-12 DIAGNOSIS — Z36.89: Primary | ICD-10-CM

## 2019-02-12 PROCEDURE — 76805 OB US >/= 14 WKS SNGL FETUS: CPT

## 2019-02-12 NOTE — DIAGNOSTIC IMAGING REPORT
INDICATION: Fetal survey.



TECHNIQUE: Multiple real-time grayscale images were obtained over

the gravid uterus.



COMPARISON: 01/18/2019.



FINDINGS: There is a single live fetus in a transverse

presentation. Head appears to be maternal right. Fetal heart rate

was recorded at 147 beats per minute. Placenta is anterior.

Amniotic fluid volume is normal. Fetal survey demonstrates fetal

kidneys, bladder and stomach to be unremarkable. The brain is

unremarkable. There is a four-chamber heart. There is a

three-vessel cord with normal insertion. Fetal spine is

unremarkable.



Biometrical measurements are as follows:

Biparietal 3.77 cm, age 17 weeks 4 days.

Head circumference 14.58 cm, age 17 weeks 6 days.

Abdominal circumference 12.10 cm, age 17 weeks 6 days.

Femur length 5.68 cm, age 18 weeks 2 days.



Sonographic estimate age: 18 weeks 0 days.

Sonographic estimated date of delivery: 07/16/19.



Estimated Fetal Weight: 216 gm (+/- 32  gm).

LMP percentile: 25%.



Fetal heart rate: 147 beats per minute.



Fetal number: 1 of 1.



IMPRESSION: Single live IUP approximately 18 weeks 0 days

gestational age. Estimated date of confinement sonographically is

07/16/2019.



Dictated by: 



  Dictated on workstation # [id number]

## 2019-06-24 ENCOUNTER — HOSPITAL ENCOUNTER (OUTPATIENT)
Dept: HOSPITAL 75 - WSO | Age: 24
Discharge: HOME | End: 2019-06-24
Attending: FAMILY MEDICINE
Payer: MEDICAID

## 2019-06-24 VITALS — HEIGHT: 63 IN | BODY MASS INDEX: 25.05 KG/M2 | WEIGHT: 141.38 LBS

## 2019-06-24 VITALS — SYSTOLIC BLOOD PRESSURE: 130 MMHG | DIASTOLIC BLOOD PRESSURE: 80 MMHG

## 2019-06-24 DIAGNOSIS — O47.1: Primary | ICD-10-CM

## 2019-06-24 DIAGNOSIS — Z3A.37: ICD-10-CM

## 2019-06-24 LAB
APTT PPP: YELLOW S
BACTERIA #/AREA URNS HPF: (no result) /HPF
BARBITURATES UR QL: NEGATIVE
BENZODIAZ UR QL SCN: NEGATIVE
BILIRUB UR QL STRIP: NEGATIVE
COCAINE UR QL: NEGATIVE
FIBRINOGEN PPP-MCNC: CLEAR MG/DL
GLUCOSE UR STRIP-MCNC: NEGATIVE MG/DL
KETONES UR QL STRIP: NEGATIVE
LEUKOCYTE ESTERASE UR QL STRIP: (no result)
METHADONE UR QL SCN: NEGATIVE
METHAMPHETAMINE SCREEN URINE S: NEGATIVE
NITRITE UR QL STRIP: NEGATIVE
OPIATES UR QL SCN: NEGATIVE
OXYCODONE UR QL: NEGATIVE
PH UR STRIP: 7 [PH] (ref 5–9)
PROPOXYPH UR QL: NEGATIVE
PROT UR QL STRIP: NEGATIVE
RBC #/AREA URNS HPF: (no result) /HPF
SP GR UR STRIP: 1 (ref 1.02–1.02)
SQUAMOUS #/AREA URNS HPF: (no result) /HPF
TRICYCLICS UR QL SCN: NEGATIVE
UROBILINOGEN UR-MCNC: NORMAL MG/DL
WBC #/AREA URNS HPF: (no result) /HPF

## 2019-06-24 PROCEDURE — 81000 URINALYSIS NONAUTO W/SCOPE: CPT

## 2019-06-24 PROCEDURE — 80306 DRUG TEST PRSMV INSTRMNT: CPT

## 2019-06-24 PROCEDURE — 99213 OFFICE O/P EST LOW 20 MIN: CPT

## 2019-06-24 NOTE — NUR
NIKKI HUBBARD presented to unit via W/C from ED, accompanied by FRIEND, with c/o LOWER BACK 
PAIN; DIZZINESS. NIKKI HUBBARD weighed, gowned, voided, and to bed.  EFHM and TOCO applied, 
VS taken.  NIKKI HUBBARD oriented to bed controls, call light, TV, heat, and A/C controls.

## 2019-06-24 NOTE — NUR
DR. COOPER NOTIFIED OF PT'S ARRIVAL, C/O, GESTATION, REVIEW OF STRIP, UA RESULTS, SVE X2, VS 
AND PAIN LEVEL. NEW ORDERS RECEIVED FOR DISCHARGE HOME, PT TO KEEP SCHEDULED FOLLOW UP 
APPOINTMENT.

## 2019-06-24 NOTE — NUR
PT DISCHARGED FROM [facility] TO PERSONAL AUTO VIA AMBULATORY IN STABLE CONDITION ACC BY FRIEND.

## 2019-06-27 NOTE — PHYSICIAN QUERY-FINAL DX
BARBARA KIM 6/27/19 1622:


Final Diagnosis


Give Final Diagnosis


Please give Final Diagnosis


Dr Cooper


Please give a final diagonsis. Please include the weeks of gestation.





thank you





CANDELARIA COOPER MD 6/30/19 2206:


Final Diagnosis


Give Final Diagnosis


1.  IUP at 37 weeks


2.  Non-labor











BARBARA KIM             Jun 27, 2019 16:22


CANDELARIA COOPER MD              Jun 30, 2019 22:06

## 2019-06-30 ENCOUNTER — HOSPITAL ENCOUNTER (INPATIENT)
Dept: HOSPITAL 75 - WSO | Age: 24
LOS: 2 days | Discharge: HOME | End: 2019-07-02
Payer: MEDICAID

## 2019-09-07 ENCOUNTER — HOSPITAL ENCOUNTER (EMERGENCY)
Dept: HOSPITAL 75 - ER | Age: 24
Discharge: HOME | End: 2019-09-07
Payer: MEDICAID

## 2019-09-07 VITALS — SYSTOLIC BLOOD PRESSURE: 135 MMHG | DIASTOLIC BLOOD PRESSURE: 80 MMHG

## 2019-09-07 VITALS — BODY MASS INDEX: 21.26 KG/M2 | HEIGHT: 63 IN | WEIGHT: 120 LBS

## 2019-09-07 DIAGNOSIS — I10: ICD-10-CM

## 2019-09-07 DIAGNOSIS — R51: Primary | ICD-10-CM

## 2019-09-07 DIAGNOSIS — G40.909: ICD-10-CM

## 2019-09-07 LAB
ALBUMIN SERPL-MCNC: 4.1 GM/DL (ref 3.2–4.5)
ALP SERPL-CCNC: 43 U/L (ref 40–136)
ALT SERPL-CCNC: 9 U/L (ref 0–55)
APTT PPP: YELLOW S
BACTERIA #/AREA URNS HPF: (no result) /HPF
BASOPHILS # BLD AUTO: 0 10^3/UL (ref 0–0.1)
BASOPHILS NFR BLD AUTO: 0 % (ref 0–10)
BILIRUB SERPL-MCNC: 0.2 MG/DL (ref 0.1–1)
BILIRUB UR QL STRIP: NEGATIVE
BUN/CREAT SERPL: 11
CALCIUM SERPL-MCNC: 8.7 MG/DL (ref 8.5–10.1)
CHLORIDE SERPL-SCNC: 110 MMOL/L (ref 98–107)
CO2 SERPL-SCNC: 19 MMOL/L (ref 21–32)
CREAT SERPL-MCNC: 0.79 MG/DL (ref 0.6–1.3)
EOSINOPHIL # BLD AUTO: 0.1 10^3/UL (ref 0–0.3)
EOSINOPHIL NFR BLD AUTO: 2 % (ref 0–10)
ERYTHROCYTE [DISTWIDTH] IN BLOOD BY AUTOMATED COUNT: 22.7 % (ref 10–14.5)
FIBRINOGEN PPP-MCNC: CLEAR MG/DL
GFR SERPLBLD BASED ON 1.73 SQ M-ARVRAT: > 60 ML/MIN
GLUCOSE SERPL-MCNC: 85 MG/DL (ref 70–105)
GLUCOSE UR STRIP-MCNC: NEGATIVE MG/DL
HCT VFR BLD CALC: 31 % (ref 35–52)
HGB BLD-MCNC: 9.4 G/DL (ref 11.5–16)
KETONES UR QL STRIP: NEGATIVE
LEUKOCYTE ESTERASE UR QL STRIP: (no result)
LYMPHOCYTES # BLD AUTO: 1.7 X 10^3 (ref 1–4)
LYMPHOCYTES NFR BLD AUTO: 23 % (ref 12–44)
MANUAL DIFFERENTIAL PERFORMED BLD QL: NO
MCH RBC QN AUTO: 22 PG (ref 25–34)
MCHC RBC AUTO-ENTMCNC: 30 G/DL (ref 32–36)
MCV RBC AUTO: 74 FL (ref 80–99)
MONOCYTES # BLD AUTO: 0.6 X 10^3 (ref 0–1)
MONOCYTES NFR BLD AUTO: 8 % (ref 0–12)
NEUTROPHILS # BLD AUTO: 5 X 10^3 (ref 1.8–7.8)
NEUTROPHILS NFR BLD AUTO: 68 % (ref 42–75)
NITRITE UR QL STRIP: NEGATIVE
PH UR STRIP: 6 [PH] (ref 5–9)
PLATELET # BLD: 300 10^3/UL (ref 130–400)
PMV BLD AUTO: 11.3 FL (ref 7.4–10.4)
POTASSIUM SERPL-SCNC: 4.1 MMOL/L (ref 3.6–5)
PROT SERPL-MCNC: 7 GM/DL (ref 6.4–8.2)
PROT UR QL STRIP: (no result)
RBC #/AREA URNS HPF: (no result) /HPF
SODIUM SERPL-SCNC: 139 MMOL/L (ref 135–145)
SP GR UR STRIP: 1.02 (ref 1.02–1.02)
SQUAMOUS #/AREA URNS HPF: (no result) /HPF
UROBILINOGEN UR-MCNC: NORMAL MG/DL
WBC # BLD AUTO: 7.4 10^3/UL (ref 4.3–11)
WBC #/AREA URNS HPF: (no result) /HPF

## 2019-09-07 PROCEDURE — 96374 THER/PROPH/DIAG INJ IV PUSH: CPT

## 2019-09-07 PROCEDURE — 85025 COMPLETE CBC W/AUTO DIFF WBC: CPT

## 2019-09-07 PROCEDURE — 80053 COMPREHEN METABOLIC PANEL: CPT

## 2019-09-07 PROCEDURE — 70450 CT HEAD/BRAIN W/O DYE: CPT

## 2019-09-07 PROCEDURE — 70486 CT MAXILLOFACIAL W/O DYE: CPT

## 2019-09-07 PROCEDURE — 81000 URINALYSIS NONAUTO W/SCOPE: CPT

## 2019-09-07 PROCEDURE — 36415 COLL VENOUS BLD VENIPUNCTURE: CPT

## 2019-09-07 PROCEDURE — 84703 CHORIONIC GONADOTROPIN ASSAY: CPT

## 2019-09-07 NOTE — ED GENERAL
General


Stated Complaint:  POSS SEIZURE


Source of Information:  Patient


Exam Limitations:  No Limitations





History of Present Illness


Date Seen by Provider:  Sep 7, 2019


Time Seen by Provider:  14:51


Initial Comments


To ER per EMS from her an air conditioned home in [facility] with reports of 

possible seizure. She was apparently talking to her  on the phone, she 

then dropped the phone with the line went dead, he thought he could hear her 

having a seizure in the background so he summoned 911. She has a history of 

epilepsy and takes Keppra twice a day. He complains of a headache, midline lower

mandible pain, denies hitting her head that she is aware of but states that she 

may have and just not known. She states that when she awakened this morning she 

did not have headache or jaw pain.


Timing/Duration:  1-2 Days


Severity:  Moderate


Associated Systoms:  Headaches





Allergies and Home Medications


Allergies


Coded Allergies:  


     No Known Drug Allergies (Unverified , 10/12/15)





Home Medications


Levetiracetam 500 Mg Tablet, 500 MG PO BID


   Prescribed by: ARJUN KNOWLES on 1/10/17 0658





Patient Home Medication List


Home Medication List Reviewed:  Yes





Review of Systems


Review of Systems


Constitutional:  see HPI; No chills, No fever; other (she was measured at 101 

at home tympanic temperature. 99 here without intervention other than the air 

conditioned ambulance. She otherwise denies recent fevers or chills or illness.)


EENTM:  see HPI


Respiratory:  no symptoms reported


Genitourinary:  no symptoms reported


Musculoskeletal:  no symptoms reported


Skin:  no symptoms reported


Psychiatric/Neurological:  See HPI, Headache





Past Medical-Social-Family Hx


Patient Social History


2nd Hand Smoke Exposure:  No


Recent Hopitalizations:  No





Immunizations Up To Date


Tetanus Booster (TDap):  Unknown





Seasonal Allergies


Seasonal Allergies:  No





Past Medical History


Surgeries:  No


Respiratory:  No


Cardiac:  No


Hypertension


Neurological:  Yes


Seizure Disorder


Reproductive Disorders:  No


Genitourinary:  No


Gastrointestinal:  No


Musculoskeletal:  No


Endocrine:  No


HEENT:  No


Cancer:  No


Psychosocial:  No


Integumentary:  No


Blood Disorders:  No


Adverse Reaction/Blood Tranf:  No





Family Medical History





Patient reports no known family medical history.


No Pertinent Family Hx





Physical Exam


Vital Signs





Vital Signs - First Documented








 9/7/19





 14:50


 


Temp 99.5


 


Pulse 89


 


Resp 18


 


B/P (MAP) 141/100 (114)


 


Pulse Ox 99





Capillary Refill :


Height, Weight, BMI


Height: 5'3.00"


Weight: 140lbs. 0.0oz. 63.651174lz; 24.8 BMI


Method:Stated


General Appearance:  No Apparent Distress, WD/WN


Eyes:  Bilateral Eye Normal Inspection, Bilateral Eye PERRL, Bilateral Eye EOMI


HEENT:  PERRL/EOMI, TMs Normal, Other (very poor dentition with gingivitis but 

no abscess or swelling visualized any portion of the face.)


Neck:  Full Range of Motion, Normal Inspection


Respiratory:  Normal Breath Sounds, No Accessory Muscle Use, No Respiratory 

Distress


Gastrointestinal:  Normal Bowel Sounds, Non Tender, Soft


Extremity:  Normal Capillary Refill, Normal Inspection


Neurologic/Psychiatric:  Alert, Oriented x3





Progress/Results/Core Measures


Suspected Sepsis


SIRS


Temperature: 


Pulse:  


Respiratory Rate: 


 


Laboratory Tests


9/7/19 14:57: White Blood Count 7.4


Blood Pressure  / 


Mean: 


 











Laboratory Tests


9/7/19 14:57: 


Creatinine 0.79, Platelet Count 300, Total Bilirubin 0.2





Results/Orders


Lab Results





Laboratory Tests








Test


 9/7/19


14:57 Range/Units


 


 


White Blood Count


 7.4 


 4.3-11.0


10^3/uL


 


Red Blood Count


 4.26 L


 4.35-5.85


10^6/uL


 


Hemoglobin 9.4 L 11.5-16.0  G/DL


 


Hematocrit 31 L 35-52  %


 


Mean Corpuscular Volume 74 L 80-99  FL


 


Mean Corpuscular Hemoglobin 22 L 25-34  PG


 


Mean Corpuscular Hemoglobin


Concent 30 L


 32-36  G/DL





 


Red Cell Distribution Width 22.7 H 10.0-14.5  %


 


Platelet Count


 300 


 130-400


10^3/uL


 


Mean Platelet Volume 11.3 H 7.4-10.4  FL


 


Neutrophils (%) (Auto) 68  42-75  %


 


Lymphocytes (%) (Auto) 23  12-44  %


 


Monocytes (%) (Auto) 8  0-12  %


 


Eosinophils (%) (Auto) 2  0-10  %


 


Basophils (%) (Auto) 0  0-10  %


 


Neutrophils # (Auto) 5.0  1.8-7.8  X 10^3


 


Lymphocytes # (Auto) 1.7  1.0-4.0  X 10^3


 


Monocytes # (Auto) 0.6  0.0-1.0  X 10^3


 


Eosinophils # (Auto)


 0.1 


 0.0-0.3


10^3/uL


 


Basophils # (Auto)


 0.0 


 0.0-0.1


10^3/uL


 


Urine Color YELLOW   


 


Urine Clarity CLEAR   


 


Urine pH 6  5-9  


 


Urine Specific Gravity 1.020  1.016-1.022  


 


Urine Protein 1+ H NEGATIVE  


 


Urine Glucose (UA) NEGATIVE  NEGATIVE  


 


Urine Ketones NEGATIVE  NEGATIVE  


 


Urine Nitrite NEGATIVE  NEGATIVE  


 


Urine Bilirubin NEGATIVE  NEGATIVE  


 


Urine Urobilinogen NORMAL  NORMAL  MG/DL


 


Urine Leukocyte Esterase 1+ H NEGATIVE  


 


Urine RBC (Auto) 5+ H NEGATIVE  


 


Urine RBC  H  /HPF


 


Urine WBC RARE   /HPF


 


Urine Squamous Epithelial


Cells 0-2 


  /HPF





 


Urine Crystals NONE   /LPF


 


Urine Bacteria TRACE   /HPF


 


Urine Casts NONE   /LPF


 


Urine Mucus NEGATIVE   /LPF


 


Urine Culture Indicated NO   


 


Sodium Level 139  135-145  MMOL/L


 


Potassium Level 4.1  3.6-5.0  MMOL/L


 


Chloride Level 110 H   MMOL/L


 


Carbon Dioxide Level 19 L 21-32  MMOL/L


 


Anion Gap 10  5-14  MMOL/L


 


Blood Urea Nitrogen 9  7-18  MG/DL


 


Creatinine


 0.79 


 0.60-1.30


MG/DL


 


Estimat Glomerular Filtration


Rate > 60 


  





 


BUN/Creatinine Ratio 11   


 


Glucose Level 85    MG/DL


 


Calcium Level 8.7  8.5-10.1  MG/DL


 


Corrected Calcium 8.6  8.5-10.1  MG/DL


 


Total Bilirubin 0.2  0.1-1.0  MG/DL


 


Aspartate Amino Transf


(AST/SGOT) 17 


 5-34  U/L





 


Alanine Aminotransferase


(ALT/SGPT) 9 


 0-55  U/L





 


Alkaline Phosphatase 43    U/L


 


Total Protein 7.0  6.4-8.2  GM/DL


 


Albumin 4.1  3.2-4.5  GM/DL


 


Serum Pregnancy Test,


Qualitative NEGATIVE 


 NEGATIVE  











My Orders





Orders - DAYANA BLANKENSHIP


Cbc With Automated Diff (9/7/19 14:50)


Hcg,Qualitative Serum (9/7/19 14:50)


Comprehensive Metabolic Panel (9/7/19 14:50)


Ua Culture If Indicated (9/7/19 14:50)


Ed Iv/Invasive Line Start (9/7/19 14:50)


Ct Head/Maxillofacial Wo (9/7/19 14:50)


Ketorolac Injection (Toradol Injection) (9/7/19 15:00)





Medications Given in ED





Current Medications








 Medications  Dose


 Ordered  Sig/Nestor


 Route  Start Time


 Stop Time Status Last Admin


Dose Admin


 


 Ketorolac


 Tromethamine  15 mg  ONCE  ONCE


 IVP  9/7/19 15:00


 9/7/19 15:01 DC 9/7/19 15:05


15 MG








Vital Signs/I&O











 9/7/19





 14:50


 


Temp 99.5


 


Pulse 89


 


Resp 18


 


B/P (MAP) 141/100 (114)


 


Pulse Ox 99





Capillary Refill :





Departure


Impression





   Primary Impression:  


   Headache


   Qualified Codes:  R51 - Headache


Disposition:  01 HOME, SELF-CARE


Condition:  Stable





Departure-Patient Inst.


Decision time for Depature:  15:53


Referrals:  


CANDELARIA COOPER MD (PCP/Family)


Primary Care Physician


Patient Instructions:  Headache, Adult (DC)





Add. Discharge Instructions:  


1. Return to ER for any concerns


2. Follow-up with your doctor next week











DAYANA BLANKENSHIP              Sep 7, 2019 14:54

## 2019-09-07 NOTE — DIAGNOSTIC IMAGING REPORT
PROCEDURE: CT head and maxillofacial without contrast.



TECHNIQUE: Multiple contiguous axial images were obtained through

the head and facial bones without the use of intravenous

contrast. Auto Exposure Controls were utilized during the CT exam

to meet ALARA standards for radiation dose reduction. 



INDICATION: Seizure.



FINDINGS:

 

CT head: There is no mass, shift of the midline or hemorrhage to

suggest an acute intracranial abnormality. The ventricles are not

abnormally dilated and stable in size when compared to the prior

exam of 12/1/2017. The bone windows show no evidence for a

fracture or for a destructive lesion. The orbits are symmetrical

and within normal limits. The sinuses, where visualized, are

generally clear. 



IMPRESSION:

1. There is no evidence for an acute intracranial abnormality and

there is no sign of a mass lesion.

2. If clinical concern regarding an underlying abnormality

persists, then MRI would be recommended for further study.   



CT maxillofacial: There are no prior studies for comparison. The

nasal bone, orbital rims, zygomatic arches and mandible are

intact. The sinuses are generally clear although there is a 1.5

cm retention cyst on the floor in the left maxillary antrum. The

orbits are symmetrical and within normal limits.



There does appear to be sclerosis of the mastoid air cells,

bilaterally. This may be a sequela of prior episodes of

mastoiditis.



IMPRESSION:

1. There is no evidence for an acute bony abnormality.

2. The sinuses are generally clear but there is a 1.5 cm

retention cyst in the left maxillary antrum.

3. The appearance of the mastoid air cells may be a sequela of

prior episodes of mastoiditis.  



Dictated by: 



  Dictated on workstation # [id number]

## 2019-09-29 ENCOUNTER — HOSPITAL ENCOUNTER (EMERGENCY)
Dept: HOSPITAL 75 - ER | Age: 24
Discharge: HOME | End: 2019-09-29
Payer: MEDICAID

## 2019-09-29 VITALS — BODY MASS INDEX: 23.67 KG/M2 | HEIGHT: 62.99 IN | WEIGHT: 133.6 LBS

## 2019-09-29 VITALS — SYSTOLIC BLOOD PRESSURE: 148 MMHG | DIASTOLIC BLOOD PRESSURE: 93 MMHG

## 2019-09-29 DIAGNOSIS — G40.909: ICD-10-CM

## 2019-09-29 DIAGNOSIS — Z87.891: ICD-10-CM

## 2019-09-29 DIAGNOSIS — I10: ICD-10-CM

## 2019-09-29 DIAGNOSIS — T63.441A: Primary | ICD-10-CM

## 2019-09-29 PROCEDURE — 99283 EMERGENCY DEPT VISIT LOW MDM: CPT

## 2019-09-29 NOTE — ED INTEGUMENTARY GENERAL
General


Chief Complaint:  Bite-Animal/Human/Insect


Stated Complaint:  BEE STING R ARM SWELLING


Nursing Triage Note:  


Pt amb to triage with c/o bee sting to Rt superior bicep. Pt reports bee


stung


her yesterday @ approx 1400. Pt reports she applied "Afterbite" cream to


area.


Pt reports on this day area became inflammed and began to swell. Denies


SOA.


Source:  patient


Exam Limitations:  no limitations





History of Present Illness


Date Seen by Provider:  Sep 29, 2019


Time Seen by Provider:  15:04


Initial Comments


23-year-old female patient presents to the emergency department with complaints 

of a bee sting to the right proximal arm.  Incident occurred yesterday at 

approximately 1400.  Patient reports applying after bite cream without 

improvement.  Now reports redness and swelling.  Also complains of pruritus.  

Patient states she came to the emergency department because "this has never 

happened before when I have been stung by a bee."


Location Injury Occurred:  home


Timing/Duration:  yesterday


Location:  extremities (rt bicep)


Possible Cause:  insect sting (bee sting)


Modifying Factors:  worse with scratching, worse with other (no improvement with

"after bite" cream)





Allergies and Home Medications


Allergies


Coded Allergies:  


     No Known Drug Allergies (Unverified , 10/12/15)





Home Medications


Levetiracetam 500 Mg Tablet, 500 MG PO BID


   Prescribed by: ARJUN KNOWLES on 1/10/17 0658


Prednisone 20 Mg Tab, 40 MG PO DAILY


   Prescribed by: KADY MCGREGOR on 9/29/19 1510





Patient Home Medication List


Home Medication List Reviewed:  Yes





Review of Systems


Review of Systems


Constitutional:  No chills, No diaphoresis, No dizziness, No fever, No malaise


EENTM:  No ear pain, No eye pain, No tearing, No hoarseness, No mouth pain, No 

mouth swelling, No nose congestion, No throat pain, No throat swelling


Respiratory:  No cough, No phlegm, No short of breath, No stridor, No wheezing


Cardiovascular:  no symptoms reported


Gastrointestinal:  no symptoms reported


Musculoskeletal:  No joint pain, No joint swelling


Skin:  see HPI


Psychiatric/Neurological:  Denies Headache





All Other Systems Reviewed


Negative Unless Noted:  Yes (Negative excepted noted.)





Past Medical-Social-Family Hx


Past Med/Social Hx:  Reviewed Nursing Past Med/Soc Hx


Patient Social History


Alcohol Use:  Denies Use


Recreational Drug Use:  No


Smoking Status:  Former Smoker


Type Used:  Cigarettes


2nd Hand Smoke Exposure:  No


Recent Foreign Travel:  No


Contact w/Someone Who Travel:  No


Recent Infectious Disease Expo:  No


Recent Hopitalizations:  No





Immunizations Up To Date


Tetanus Booster (TDap):  Unknown





Seasonal Allergies


Seasonal Allergies:  No





Past Medical History


Surgeries:  No


Respiratory:  No


Cardiac:  No


Hypertension


Neurological:  Yes


Seizure Disorder


Reproductive Disorders:  No


Genitourinary:  No


Gastrointestinal:  No


Musculoskeletal:  No


Endocrine:  No


HEENT:  No


Cancer:  No


Psychosocial:  No


Integumentary:  No


Blood Disorders:  No


Adverse Reaction/Blood Tranf:  No





Family Medical History


Reviewed Nursing Family Hx





Patient reports no known family medical history.


No Pertinent Family Hx





Physical Exam


Vital Signs





Vital Signs - First Documented








 9/29/19





 14:44


 


Temp 36.6


 


Pulse 57


 


Resp 16


 


B/P (MAP) 148/93 (111)


 


Pulse Ox 98


 


O2 Delivery Room Air





Capillary Refill : Less Than 3 Seconds


General Appearance:  WD/WN, no apparent distress


HEENT:  PERRL/EOMI, pharynx normal


Neck:  supple, normal inspection


Cardiovascular:  normal peripheral pulses, regular rate, rhythm, no edema, no 

murmur


Respiratory:  lungs clear, normal breath sounds, no respiratory distress, no 

accessory muscle use


Extremities:  normal range of motion, non-tender, normal capillary refill, 

swelling (puncture site noted to the right bicep with a slightly raised, light 

pink blush noted surrounding the puncture site consistent with a wheal.  Minimal

warmth noted.)


Neurologic/Psychiatric:  alert, normal mood/affect, oriented x 3


Skin:  normal color, warm/dry, other (puncture site noted to the right medial 

bicep with a slightly raised, light pink blush noted surrounding the puncture 

site consistent with a wheal.  Minimal warmth noted.)


Skin Problem Location:  upper extremities (right medial bicep)


Skin Problem Character:  other (puncture site noted to the right medial bicep 

with a slightly raised, light pink blush noted surrounding the puncture site 

consistent with a wheal.  Minimal warmth noted.)





Progress/Results/Core Measures


Results/Orders


Vital Signs/I&O











 9/29/19 9/29/19





 14:44 15:25


 


Temp 36.6 36.6


 


Pulse 57 57


 


Resp 16 16


 


B/P (MAP) 148/93 (111) 148/93 (111)


 


Pulse Ox 98 98


 


O2 Delivery Room Air 








2





Blood Pressure Mean:                    111











Departure


Communication (Admissions)


Patient seen and evaluated.  Plan for discharge to home.





Impression





   Primary Impression:  


   Bee sting


   Qualified Codes:  T63.441A - Toxic effect of venom of bees, accidental 

   (unintentional), initial encounter


Disposition:  01 HOME, SELF-CARE


Condition:  Improved





Departure-Patient Inst.


Decision time for Depature:  15:10


Referrals:  


CANDELARIA COOPER MD (PCP/Family)


Primary Care Physician


Patient Instructions:  Insect Bites and Stings





Add. Discharge Instructions:  


All discharge instructions reviewed with patient and/or family. Voiced 

understanding.  Medications as instructed.  Allegra over-the-counter as directed

for itching or rash.  Benadryl over-the-counter as directed for breakthrough s

ymptoms.  Follow-up with your family practitioner if needed.  Return to the 

emergency department for worsened symptoms or any other concerns.


Scripts


Prednisone (Prednisone) 20 Mg Tab


40 MG PO DAILY, #10 TAB 0 Refills


   Prov: KADY MCGREGOR         9/29/19





Copy


Copies To 1:   CANDELARIA COOPER MD, GRETCHEN L PA           Sep 29, 2019 15:04

## 2019-10-08 ENCOUNTER — HOSPITAL ENCOUNTER (EMERGENCY)
Dept: HOSPITAL 75 - ER | Age: 24
LOS: 1 days | Discharge: LEFT BEFORE DISCHARGE | End: 2019-10-09
Payer: MEDICAID

## 2019-10-08 ENCOUNTER — HOSPITAL ENCOUNTER (EMERGENCY)
Dept: HOSPITAL 75 - ER | Age: 24
Discharge: HOME | End: 2019-10-08
Payer: MEDICAID

## 2019-10-08 VITALS — BODY MASS INDEX: 22.7 KG/M2 | WEIGHT: 128.09 LBS | HEIGHT: 62.99 IN

## 2019-10-08 VITALS — DIASTOLIC BLOOD PRESSURE: 76 MMHG | SYSTOLIC BLOOD PRESSURE: 134 MMHG

## 2019-10-08 DIAGNOSIS — F17.210: ICD-10-CM

## 2019-10-08 DIAGNOSIS — R56.9: ICD-10-CM

## 2019-10-08 DIAGNOSIS — G40.909: ICD-10-CM
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
## 2019-10-08 DIAGNOSIS — R51: Primary | ICD-10-CM

## 2019-10-08 DIAGNOSIS — I10: ICD-10-CM

## 2019-10-08 DIAGNOSIS — S00.501A: Primary | ICD-10-CM

## 2019-10-08 LAB
ALBUMIN SERPL-MCNC: 4.4 GM/DL (ref 3.2–4.5)
ALP SERPL-CCNC: 58 U/L (ref 40–136)
ALT SERPL-CCNC: 13 U/L (ref 0–55)
AMYLASE SERPL-CCNC: 88 U/L (ref 25–125)
APAP SERPL-MCNC: < 10 UG/ML (ref 10–30)
APTT BLD: 28 SEC (ref 24–35)
APTT PPP: YELLOW S
BACTERIA #/AREA URNS HPF: (no result) /HPF
BARBITURATES UR QL: NEGATIVE
BASOPHILS # BLD AUTO: 0 10^3/UL (ref 0–0.1)
BASOPHILS NFR BLD AUTO: 0 % (ref 0–10)
BENZODIAZ UR QL SCN: NEGATIVE
BILIRUB SERPL-MCNC: 0.3 MG/DL (ref 0.1–1)
BILIRUB UR QL STRIP: NEGATIVE
BUN/CREAT SERPL: 15
CALCIUM SERPL-MCNC: 9.2 MG/DL (ref 8.5–10.1)
CHLORIDE SERPL-SCNC: 109 MMOL/L (ref 98–107)
CK MB SERPL-MCNC: 0.4 NG/ML (ref ?–6.6)
CK SERPL-CCNC: 63 U/L (ref 29–168)
CO2 SERPL-SCNC: 21 MMOL/L (ref 21–32)
COCAINE UR QL: NEGATIVE
CREAT SERPL-MCNC: 0.78 MG/DL (ref 0.6–1.3)
EOSINOPHIL # BLD AUTO: 0.1 10^3/UL (ref 0–0.3)
EOSINOPHIL NFR BLD AUTO: 1 % (ref 0–10)
ERYTHROCYTE [DISTWIDTH] IN BLOOD BY AUTOMATED COUNT: 19.6 % (ref 10–14.5)
FIBRINOGEN PPP-MCNC: CLEAR MG/DL
GFR SERPLBLD BASED ON 1.73 SQ M-ARVRAT: > 60 ML/MIN
GLUCOSE SERPL-MCNC: 87 MG/DL (ref 70–105)
GLUCOSE UR STRIP-MCNC: NEGATIVE MG/DL
HCT VFR BLD CALC: 34 % (ref 35–52)
HGB BLD-MCNC: 10.8 G/DL (ref 11.5–16)
INR PPP: 1.1 (ref 0.8–1.4)
KETONES UR QL STRIP: NEGATIVE
LEUKOCYTE ESTERASE UR QL STRIP: NEGATIVE
LYMPHOCYTES # BLD AUTO: 1.6 X 10^3 (ref 1–4)
LYMPHOCYTES NFR BLD AUTO: 14 % (ref 12–44)
MAGNESIUM SERPL-MCNC: 2 MG/DL (ref 1.6–2.4)
MANUAL DIFFERENTIAL PERFORMED BLD QL: NO
MCH RBC QN AUTO: 23 PG (ref 25–34)
MCHC RBC AUTO-ENTMCNC: 32 G/DL (ref 32–36)
MCV RBC AUTO: 74 FL (ref 80–99)
METHADONE UR QL SCN: NEGATIVE
METHAMPHETAMINE SCREEN URINE S: NEGATIVE
MONOCYTES # BLD AUTO: 1 X 10^3 (ref 0–1)
MONOCYTES NFR BLD AUTO: 9 % (ref 0–12)
NEUTROPHILS # BLD AUTO: 8.8 X 10^3 (ref 1.8–7.8)
NEUTROPHILS NFR BLD AUTO: 76 % (ref 42–75)
NITRITE UR QL STRIP: NEGATIVE
OPIATES UR QL SCN: NEGATIVE
OXYCODONE UR QL: NEGATIVE
PH UR STRIP: 5 [PH] (ref 5–9)
PLATELET # BLD: 316 10^3/UL (ref 130–400)
PMV BLD AUTO: 10.8 FL (ref 7.4–10.4)
POTASSIUM SERPL-SCNC: 3.6 MMOL/L (ref 3.6–5)
PROPOXYPH UR QL: NEGATIVE
PROT SERPL-MCNC: 7.4 GM/DL (ref 6.4–8.2)
PROT UR QL STRIP: NEGATIVE
PROTHROMBIN TIME: 14.4 SEC (ref 12.2–14.7)
RBC #/AREA URNS HPF: (no result) /HPF
SODIUM SERPL-SCNC: 140 MMOL/L (ref 135–145)
SP GR UR STRIP: 1.01 (ref 1.02–1.02)
SQUAMOUS #/AREA URNS HPF: (no result) /HPF
TRICYCLICS UR QL SCN: NEGATIVE
TSH SERPL DL<=0.05 MIU/L-ACNC: 1.37 UIU/ML (ref 0.35–4.94)
UROBILINOGEN UR-MCNC: NORMAL MG/DL
WBC # BLD AUTO: 11.6 10^3/UL (ref 4.3–11)
WBC #/AREA URNS HPF: (no result) /HPF

## 2019-10-08 PROCEDURE — 96361 HYDRATE IV INFUSION ADD-ON: CPT

## 2019-10-08 PROCEDURE — 85610 PROTHROMBIN TIME: CPT

## 2019-10-08 PROCEDURE — 70450 CT HEAD/BRAIN W/O DYE: CPT

## 2019-10-08 PROCEDURE — 82550 ASSAY OF CK (CPK): CPT

## 2019-10-08 PROCEDURE — 80329 ANALGESICS NON-OPIOID 1 OR 2: CPT

## 2019-10-08 PROCEDURE — 83874 ASSAY OF MYOGLOBIN: CPT

## 2019-10-08 PROCEDURE — 84703 CHORIONIC GONADOTROPIN ASSAY: CPT

## 2019-10-08 PROCEDURE — 70486 CT MAXILLOFACIAL W/O DYE: CPT

## 2019-10-08 PROCEDURE — 71046 X-RAY EXAM CHEST 2 VIEWS: CPT

## 2019-10-08 PROCEDURE — 80306 DRUG TEST PRSMV INSTRMNT: CPT

## 2019-10-08 PROCEDURE — 82150 ASSAY OF AMYLASE: CPT

## 2019-10-08 PROCEDURE — 84484 ASSAY OF TROPONIN QUANT: CPT

## 2019-10-08 PROCEDURE — 80320 DRUG SCREEN QUANTALCOHOLS: CPT

## 2019-10-08 PROCEDURE — 36415 COLL VENOUS BLD VENIPUNCTURE: CPT

## 2019-10-08 PROCEDURE — 93005 ELECTROCARDIOGRAM TRACING: CPT

## 2019-10-08 PROCEDURE — 85025 COMPLETE CBC W/AUTO DIFF WBC: CPT

## 2019-10-08 PROCEDURE — 80053 COMPREHEN METABOLIC PANEL: CPT

## 2019-10-08 PROCEDURE — 83880 ASSAY OF NATRIURETIC PEPTIDE: CPT

## 2019-10-08 PROCEDURE — 93041 RHYTHM ECG TRACING: CPT

## 2019-10-08 PROCEDURE — 96374 THER/PROPH/DIAG INJ IV PUSH: CPT

## 2019-10-08 PROCEDURE — 82553 CREATINE MB FRACTION: CPT

## 2019-10-08 PROCEDURE — 83735 ASSAY OF MAGNESIUM: CPT

## 2019-10-08 PROCEDURE — 85730 THROMBOPLASTIN TIME PARTIAL: CPT

## 2019-10-08 PROCEDURE — 84443 ASSAY THYROID STIM HORMONE: CPT

## 2019-10-08 PROCEDURE — 81000 URINALYSIS NONAUTO W/SCOPE: CPT

## 2019-10-08 NOTE — DIAGNOSTIC IMAGING REPORT
INDICATION: Headaches and dizziness and history of seizures.



Noncontrast brain CT performed and compared with 09/07/2019.



There are no extra-axial fluid collections. No intracranial

hemorrhage. No intracranial mass or mass effect. No midline

shift. The ventricles are normal in size and position. There are

no focal parenchymal abnormalities in the brain. Calvarial

windows are unremarkable.



IMPRESSION: Negative noncontrast brain CT. No change from the

previous study.



Dictated by: 



  Dictated on workstation # [id number]

## 2019-10-08 NOTE — DIAGNOSTIC IMAGING REPORT
INDICATION: Jaw pain.



CT maxillofacial obtained with axial slices without contrast and

sagittal and coronal reconstructions.



Soft tissue windows in the facial region demonstrate no evidence

of abscess or soft tissue edema or overt focal inflammatory

change. There is no intraorbital lesion. The paranasal sinuses

show no fluid levels. There are small retention cysts versus

polyps in the left maxillary sinus with minimal retention cyst or

polyp in the right maxillary sinus. Bony windows show no acute

bony abnormality. There appear to be multifocal dental caries.



IMPRESSION:



No acute bony abnormality of the maxillofacial region. There is

no soft tissue edema or hematoma. There are retention cysts

versus polyps in both maxillary sinuses left greater than right.

There are no sinus fluid levels. There are scattered dental

caries noted. The mastoid air cells are hypoplastic on both

sides, especially on the right.



Dictated by: 



  Dictated on workstation # [id number]

## 2019-10-08 NOTE — DIAGNOSTIC IMAGING REPORT
INDICATION: Cough, chest pain, and shortness of breath.



PA and lateral chest obtained at 07:47 p.m. and compared to

12/19/2018.



Heart and mediastinal silhouette are normal in appearance. The

lungs are clear. There is no pneumothorax or pleural fluid.



IMPRESSION: Negative chest.



Dictated by: 



  Dictated on workstation # [id number]

## 2019-10-08 NOTE — ED GENERAL
General


Chief Complaint:  General Problems/Pain


Stated Complaint:  DIZZY,JAW HURTING


Nursing Triage Note:  


Pt to Rm 6 via WC with C/O jaw pain/dizziness/headache since approx 1700 this 


afternoon. Pt states she has a Hx of seizures and takes her Keppra regularly. Pt




slightly confused upon examination when answering questions. Pt states she 


hasn't taken any meds for the pain prior to arrival.


Nursing Sepsis Screen:  No Definite Risk


Source of Information:  Patient





History of Present Illness


Date Seen by Provider:  Oct 8, 2019


Time Seen by Provider:  19:05


Initial Comments


PT ARRIVES VIA POV, WHEELCHAIR ON ARRIVAL--OLDER MALE BROUGHT PT IN BY POV, AND 

HAS PT'S CHILD WITH HIM


PT C/O DIZZINESS


C/O JAW PAIN


C/O MILD HEADACHE ALL OVER


STATES "THE LAST THING I REMEMBER I WAS CHANGING MY SON'S DIAPER" --AROUND 1700 

OR 1800


STATES SHE DOES NOT KNOW IF SHE PASSED OUT OR WHAT HAPPENED. STATES SHE WAS 

SITTING IN A CHAIR


STATES SHE DOES NOT KNOW IF SHE HAD A SEIZURE OR NOT, BUT HAS A HISTORY OF 

SEIZURES. NO ONE ELSE WAS HOME AT THE TIME


STATES SHE TAKES KEPPRA, AND DENIES ANY MISSED DOSES. STATES SHE TOOK HER 

REGULAR DOSE THIS AM





PT DOES NOT APPEAR POST ICTAL 





PT IS POOR HISTORIAN, AND HAS NO IDEA WHEN  HER INFANT SON'S BIRTHDAY IS. STATES

"SOME TIME THIS YEAR". ON REVIEW OF OLD RECORDS, PT DELIVERED 19 VIA C-

SECTION FOR MALPRESENTATION. LAST DELIVERY PRIOR TO THAT WAS 18. DELIVERY 

PRIOR TO THAT WAS 16. 


PT IS --STATES HER 2ND CHILD  AFTER BIRTH, AND 3 OTHER CHILDREN HAVE 

ALL PREVIOUSLY BEEN REMOVED FROM THE HOME AND ARE IN FOSTER CARE, PRIOR TO THE 

BIRTH OF HER LAST CHILD. 


PT STATES HER  IS IN half-way/PRISON.


NO ONE ELSE LIVES AT HOME WITH PT. 





LMP--NOW, BEGAN 3 DAYS AGO. NO BIRTH CONTROL





PT WITH MULTIPLE VISITS FOR VARIOUS COMPLAINTS.





PCP: DR COOPER





Allergies and Home Medications


Allergies


Coded Allergies:  


     No Known Drug Allergies (Unverified , 10/12/15)





Home Medications


Levetiracetam 500 Mg Tablet, 500 MG PO BID


   Prescribed by: ARJUN KNOWLES on 1/10/17 0658


Prednisone 20 Mg Tab, 40 MG PO DAILY


   Prescribed by: KADY MCGREGOR on 19 1510





Patient Home Medication List


Home Medication List Reviewed:  Yes





Review of Systems


Review of Systems


Constitutional:  see HPI


EENTM:  see HPI


Respiratory:  no symptoms reported; No cough, No short of breath


Cardiovascular:  No chest pain, No edema, No palpitations


Gastrointestinal:  no symptoms reported


Genitourinary:  no symptoms reported


LMP:  Oct 5, 2019


Musculoskeletal:  no symptoms reported


Skin:  no symptoms reported


Psychiatric/Neurological:  See HPI


Hematologic/Lymphatic:  No Symptoms Reported


Immunological/Allergic:  no symptoms reported





Past Medical-Social-Family Hx


Past Med/Social Hx:  Reviewed and Corrections made


Patient Social History


Alcohol Use:  Denies Use


Recreational Drug Use:  Yes (DENIES DRUG USE, BUT UDS + FOR AMPHETAMINES )


Drug of Choice:  DENIES BUT UDS + FOR AMPHETAMINES


Smoking Status:  Current Everyday Smoker (1 PPD)


Type Used:  Cigarettes (1 PPD)


2nd Hand Smoke Exposure:  No


Recent Foreign Travel:  No


Contact w/Someone Who Travel:  No


Recent Infectious Disease Expo:  No


Recent Hopitalizations:  No


Physical Abuse:  No


Sexual Abuse:  No


Mistreated:  No


Fear:  No





Immunizations Up To Date


Tetanus Booster (TDap):  Unknown





Seasonal Allergies


Seasonal Allergies:  No





Past Medical History


Surgeries:  Yes ( 19)


 Section


Respiratory:  No


Cardiac:  Yes


Hypertension


Neurological:  Yes


Seizure Disorder


Hx :  5


Hx Para:  5


Hx Total # of Abortions (Sp):  0 (2ND CHILD  AFTER BIRTH. 3 OLDEST CHILDREN 

ALL IN FOSTER CARE)


Reproductive Disorders:  No


Genitourinary:  No


Gastrointestinal:  No


Musculoskeletal:  No


Endocrine:  No


HEENT:  No


Cancer:  No


Psychosocial:  No


Integumentary:  No


Blood Disorders:  No


Adverse Reaction/Blood Tranf:  No





Family Medical History





Patient reports no known family medical history.





Physical Exam


Vital Signs





Vital Signs - First Documented








 10/8/19





 19:08


 


Temp 37.7


 


Pulse 86


 


Resp 19


 


B/P (MAP) 132/91 (105)


 


Pulse Ox 100


 


O2 Delivery Room Air





Capillary Refill : Less Than 3 Seconds


Height, Weight, BMI


Height: 5'3.00"


Weight: 120lbs. 0.0oz. 54.221946lv; 22.00 BMI


Method:Stated


General Appearance:  No Apparent Distress, WD/WN


HEENT:  PERRL/EOMI, TMs Normal, Pharynx Normal, Other (EXTENSIVE DENTAL DECAY 

DOWN TO GUMS--"METH MOUTH" IN APPEARANCE. NO EVIDENCE OF DENTAL ABSCESS. NO 

EVIDENCE OF TRAUMA TO FACE, MOUTH OR JAW, BUT LEFT CHIN IS TENDER. )


Neck:  Full Range of Motion, Normal Inspection, Non Tender, Supple


Respiratory:  Normal Breath Sounds, No Accessory Muscle Use, No Respiratory 

Distress


Cardiovascular:  Regular Rate, Rhythm, No Edema, No Murmur, Normal Peripheral 

Pulses


Gastrointestinal:  Non Tender, Soft


Back:  Normal Inspection


Extremity:  Normal Inspection


Neurologic/Psychiatric:  Alert, Oriented x3, No Motor/Sensory Deficits, Normal 

Mood/Affect, CNs II-XII Norm as Tested, Other (NO INCONTINENCE. NOT POST ICTAL)


Skin:  Normal Color, Warm/Dry





Progress/Results/Core Measures


Suspected Sepsis


Recent Fever Within 48 Hours:  No


Infection Criteria Present:  None


New/Unexplained  Altered Menta:  No


Sepsis Screen:  No Definite Risk


SIRS


Temperature: 


Pulse: 86 


Respiratory Rate: 19


 


Laboratory Tests


10/8/19 19:21: White Blood Count 11.6H


Blood Pressure 132 /91 


Mean: 105


 


Laboratory Tests


10/8/19 19:21: 


Creatinine 0.78, INR Comment 1.1, Platelet Count 316, Total Bilirubin 0.3








Results/Orders


Lab Results





My Orders





Medications Given in ED





Vital Signs/I&O


Capillary Refill : Less Than 3 Seconds








Blood Pressure Mean:                    105








Progress Note :  


Progress Note


UNEVENTFUL ER STAY





ECG


Initial ECG Impression Date:  Oct 8, 2019


Initial ECG Impression Time:  19:36


Initial ECG Rate:  78


Initial ECG Rhythm:  Normal Sinus


Initial ECG Impression:  1st Degree AV Block





Diagnostic Imaging





Comments


CXR--NO ACUTE PROCESS


CT HEAD--NO ACUTE PROCESS


PER RADIOLOGIST REPORTS AT 





CT MAXILLOFACIALS--NO ACUTE PROCESS, PER RADIOLOGIST REPORT AT 


   Reviewed:  Reviewed by Me





Departure


Communication (PCP)


--SPOKE WITH DR. COOPER.. HE WILL SEE PT IN FOLLOW UP. HE HAS VOICED HIS 

CONCERNS TO [facility], [facility] ABOUT THE INFANT THAT IS HOME WITH PT, AND WAS UNDER THE 

IMPRESSION THAT CHILD WOULD BE REMOVED FROM PT'S CUSTODY WHEN CHILD WAS BORN, 

BUT THAT DID NOT HAPPEN. HE WILL CONTACT THEM AGAIN, AS WELL. HE STATES THAT PT 

HAS A "WORKER" THAT CHECKS IN ON PT, AND A NEIGHBOR/ OLDER MALE, THAT CHECKS IN 

ON HER AS WELL.





Impression





   Primary Impression:  


   HEADACHE


   Additional Impression:  


   History of seizures


Disposition:  01 HOME, SELF-CARE


Condition:  Stable





Departure-Patient Inst.


Referrals:  


CANDELARIA COOPER MD (PCP/Family)


Primary Care Physician


Patient Instructions:  Headache, Adult (DC), Seizures, Adult (DC)





Add. Discharge Instructions:  


TAKE YOUR MEDICATION AS PRESCRIBED





TYLENOL AS NEEDED FOR HEADACHE





YOU ARE TO BE WITH ANOTHER ADULT AT ALL TIMES!!!!





FOLLOW UP WITH DR. COOPER THIS WEEK FOR FURTHER CARE





All discharge instructions reviewed with patient and/or family. Voiced 

understanding.











JANELLE FLANAGAN DO                  Oct 8, 2019 20:27 [id number] [employer]. [address] [place] [address], [place] [address]. All rights reserved. This information is not intended as a substitute for professional medical care. Always follow your healthcare professional's instructions.

## 2019-11-18 ENCOUNTER — HOSPITAL ENCOUNTER (EMERGENCY)
Dept: HOSPITAL 75 - ER | Age: 24
Discharge: HOME | End: 2019-11-18
Payer: MEDICAID

## 2019-11-18 VITALS — DIASTOLIC BLOOD PRESSURE: 88 MMHG | SYSTOLIC BLOOD PRESSURE: 124 MMHG

## 2019-11-18 VITALS — HEIGHT: 62.99 IN | WEIGHT: 137.35 LBS | BODY MASS INDEX: 24.34 KG/M2

## 2019-11-18 DIAGNOSIS — Z87.891: ICD-10-CM

## 2019-11-18 DIAGNOSIS — I10: ICD-10-CM

## 2019-11-18 DIAGNOSIS — R51: ICD-10-CM

## 2019-11-18 DIAGNOSIS — G40.909: Primary | ICD-10-CM

## 2019-11-18 LAB
ALBUMIN SERPL-MCNC: 4.6 GM/DL (ref 3.2–4.5)
ALP SERPL-CCNC: 54 U/L (ref 40–136)
ALT SERPL-CCNC: 10 U/L (ref 0–55)
APAP SERPL-MCNC: < 10 UG/ML (ref 10–30)
APTT PPP: (no result) S
BACTERIA #/AREA URNS HPF: NEGATIVE /HPF
BARBITURATES UR QL: NEGATIVE
BASOPHILS # BLD AUTO: 0 10^3/UL (ref 0–0.1)
BASOPHILS NFR BLD AUTO: 0 % (ref 0–10)
BENZODIAZ UR QL SCN: NEGATIVE
BILIRUB SERPL-MCNC: 0.4 MG/DL (ref 0.1–1)
BILIRUB UR QL STRIP: NEGATIVE
BUN/CREAT SERPL: 14
CALCIUM SERPL-MCNC: 9 MG/DL (ref 8.5–10.1)
CHLORIDE SERPL-SCNC: 109 MMOL/L (ref 98–107)
CO2 SERPL-SCNC: 23 MMOL/L (ref 21–32)
COCAINE UR QL: NEGATIVE
CREAT SERPL-MCNC: 0.8 MG/DL (ref 0.6–1.3)
EOSINOPHIL # BLD AUTO: 0 10^3/UL (ref 0–0.3)
EOSINOPHIL NFR BLD AUTO: 1 % (ref 0–10)
ERYTHROCYTE [DISTWIDTH] IN BLOOD BY AUTOMATED COUNT: 16.7 % (ref 10–14.5)
FIBRINOGEN PPP-MCNC: (no result) MG/DL
GFR SERPLBLD BASED ON 1.73 SQ M-ARVRAT: > 60 ML/MIN
GLUCOSE SERPL-MCNC: 90 MG/DL (ref 70–105)
GLUCOSE UR STRIP-MCNC: NEGATIVE MG/DL
HCT VFR BLD CALC: 33 % (ref 35–52)
HGB BLD-MCNC: 10.3 G/DL (ref 11.5–16)
KETONES UR QL STRIP: NEGATIVE
LEUKOCYTE ESTERASE UR QL STRIP: (no result)
LYMPHOCYTES # BLD AUTO: 1.2 X 10^3 (ref 1–4)
LYMPHOCYTES NFR BLD AUTO: 15 % (ref 12–44)
MANUAL DIFFERENTIAL PERFORMED BLD QL: NO
MCH RBC QN AUTO: 23 PG (ref 25–34)
MCHC RBC AUTO-ENTMCNC: 32 G/DL (ref 32–36)
MCV RBC AUTO: 74 FL (ref 80–99)
METHADONE UR QL SCN: NEGATIVE
METHAMPHETAMINE SCREEN URINE S: NEGATIVE
MONOCYTES # BLD AUTO: 0.6 X 10^3 (ref 0–1)
MONOCYTES NFR BLD AUTO: 7 % (ref 0–12)
NEUTROPHILS # BLD AUTO: 6.3 X 10^3 (ref 1.8–7.8)
NEUTROPHILS NFR BLD AUTO: 77 % (ref 42–75)
NITRITE UR QL STRIP: NEGATIVE
OPIATES UR QL SCN: NEGATIVE
OXYCODONE UR QL: NEGATIVE
PH UR STRIP: 5.5 [PH] (ref 5–9)
PLATELET # BLD: 311 10^3/UL (ref 130–400)
PMV BLD AUTO: 10.7 FL (ref 7.4–10.4)
POTASSIUM SERPL-SCNC: 3.8 MMOL/L (ref 3.6–5)
PROPOXYPH UR QL: NEGATIVE
PROT SERPL-MCNC: 7.4 GM/DL (ref 6.4–8.2)
PROT UR QL STRIP: (no result)
RBC #/AREA URNS HPF: (no result) /HPF
SALICYLATES SERPL-MCNC: < 5 MG/DL (ref 5–20)
SODIUM SERPL-SCNC: 140 MMOL/L (ref 135–145)
SP GR UR STRIP: 1.02 (ref 1.02–1.02)
TRICYCLICS UR QL SCN: NEGATIVE
WBC # BLD AUTO: 8.1 10^3/UL (ref 4.3–11)
WBC #/AREA URNS HPF: (no result) /HPF

## 2019-11-18 PROCEDURE — 80329 ANALGESICS NON-OPIOID 1 OR 2: CPT

## 2019-11-18 PROCEDURE — 85025 COMPLETE CBC W/AUTO DIFF WBC: CPT

## 2019-11-18 PROCEDURE — 80320 DRUG SCREEN QUANTALCOHOLS: CPT

## 2019-11-18 PROCEDURE — 70450 CT HEAD/BRAIN W/O DYE: CPT

## 2019-11-18 PROCEDURE — 36415 COLL VENOUS BLD VENIPUNCTURE: CPT

## 2019-11-18 PROCEDURE — 86141 C-REACTIVE PROTEIN HS: CPT

## 2019-11-18 PROCEDURE — 72125 CT NECK SPINE W/O DYE: CPT

## 2019-11-18 PROCEDURE — 70486 CT MAXILLOFACIAL W/O DYE: CPT

## 2019-11-18 PROCEDURE — 80053 COMPREHEN METABOLIC PANEL: CPT

## 2019-11-18 PROCEDURE — 80306 DRUG TEST PRSMV INSTRMNT: CPT

## 2019-11-18 PROCEDURE — 84703 CHORIONIC GONADOTROPIN ASSAY: CPT

## 2019-11-18 PROCEDURE — 81000 URINALYSIS NONAUTO W/SCOPE: CPT

## 2019-11-18 NOTE — DIAGNOSTIC IMAGING REPORT
PROCEDURE: CT head, face, and cervical spine without contrast.



TECHNIQUE: Multiple contiguous axial images were obtained through

the head, neck, and facial bones without the use of intravenous

contrast. Sagittal and coronal reformations through the cervical

spine and facial bones were also performed. Auto Exposure

Controls were utilized during the CT exam to meet ALARA standards

for radiation dose reduction. 



INDICATION: History of seizures. Had seizure today, fall with

frontal jaw pain.



CORRELATION STUDY: 10/08/2019.



FINDINGS:

CT head: Ventricular apex is incompletely imaged. The visualized

ventricles and sulci appearing unremarkable. Normal gray-white

differentiation. No abnormal areas of decreased attenuation to

suggest edema. No midline shift or mass effect. No intracranial

hemorrhage. The visualized bony calvarium appearing unremarkable.

Visualized paranasal sinuses are relatively clear. Hypoplasia

about the mastoid air cells.



CT maxillofacial: There is no acute displaced maxillofacial

fracture deformity. Nasal bones are intact. Mild-to-moderate

leftward nasal septal deviation. The paranasal sinuses

demonstrate no significant air-fluid level. Small cysts or polyps

in bilateral maxillary sinuses. The temporomandibular joint is

maintained with mandible intact. There is demonstration of

multiple lucencies through the teeth, most compatible with dental

caries versus potential fractured teeth.



CT cervical spine: Cervical spine alignment demonstrates some

straightening and reversal but otherwise maintained and intact.

Vertebral body heights are preserved. The posterior elements are

intact and in normal alignment. Odontoid is intact. Lateral

masses of C1-C2 are aligned.



IMPRESSION:

CT head:

1.  Negative for acute traumatic intracranial abnormality.



CT maxillofacial:

1. Negative for acute displaced maxillofacial fracture deformity.

Multiple scattered dental caries are present.



CT cervical spine:

1.  Negative for acute fracture or traumatic subluxation.



Dictated by: 



  Dictated on workstation # [id number]

## 2019-11-18 NOTE — ED NEUROLOGICAL PROBLEM
General


Stated Complaint:  CANT REMEMBER ANYTHING, JAW SORE,HEADACHE


Source:  patient, other


Exam Limitations:  no limitations





History of Present Illness


Date Seen by Provider:  2019


Time Seen by Provider:  15:00


Initial Comments


Patient presents to ER by private conveyance with her significant other and 

chief complaint that her friend came over to visit her this afternoon and she 

was acting very bizarrely saying she could not remember anything and very 

confused. She's had no fevers chills nausea vomiting. She has pain in the right 

jaw and right side of her face. She has a history of seizures and has been 

taking her Keppra 1000 mg twice daily as prescribed. She does not follow with a 

neurologist. She's known to Dr. Cooper. She had a seizure about a month ago. 

She's been on the Keppra for about 6 months now. Her baby is 4 months old and 

she's had no problems with the  or pregnancy periods. She denies any 

nausea. She's not taken anything for pain. She does not take any other 

medications or birth control. She does not have any other significant medical 

history or taking any other medications. She denies smoking, drinking, drugs, 

depression or suicidal ideation. She says her child has been on the bottle all 

day today. Her friend said that she spoke to earlier this afternoon and 

everything seemed okay and she told her she would be coming by this afternoon. 

When she arrived is when she discovered that the patient was acting oddly.


The patient started her period today.





Allergies and Home Medications


Allergies


Coded Allergies:  


     No Known Drug Allergies (Unverified , 10/12/15)





Home Medications


Levetiracetam 500 Mg Tablet, 500 MG PO BID


   Prescribed by: ARJUN KNOWLES on 1/10/17 0658


Prednisone 20 Mg Tab, 40 MG PO DAILY


   Prescribed by: KADY MCGREGOR on 19 1510





Patient Home Medication List


Home Medication List Reviewed:  Yes





Review of Systems


Review of Systems


Constitutional:  No chills, No diaphoresis


Eyes:  Denies Blindness, Denies Blurred Vision, Denies Drainage


Ears, Nose, Mouth, Throat:  denies ear pain, denies ear discharge


Respiratory:  No cough, No short of breath


Cardiovascular:  No chest pain, No edema


Gastrointestinal:  No abdominal pain, No constipation, No diarrhea, No nausea


Genitourinary:  No discharge, No dysuria


Pregnant:  No


Musculoskeletal:  No back pain, No joint pain


Skin:  No pruritus, No rash





All Other Systems Reviewed


Negative Unless Noted:  Yes





Past Medical-Social-Family Hx


Patient Social History


Alcohol Use:  Denies Use


Recreational Drug Use:  No


Smoking Status:  Former Smoker


Type Used:  Cigarettes


2nd Hand Smoke Exposure:  No


Recent Foreign Travel:  No


Contact w/Someone Who Travel:  No


Recent Hopitalizations:  No





Immunizations Up To Date


Tetanus Booster (TDap):  Unknown





Seasonal Allergies


Seasonal Allergies:  No





Past Medical History


Surgeries:  No


Respiratory:  No


Cardiac:  No


Hypertension


Neurological:  Yes


Seizure Disorder


Reproductive Disorders:  No


Genitourinary:  No


Gastrointestinal:  No


Musculoskeletal:  No


Endocrine:  No


HEENT:  No


Cancer:  No


Psychosocial:  No


Integumentary:  No


Blood Disorders:  No


Adverse Reaction/Blood Tranf:  No





Family Medical History





Patient reports no known family medical history.


No Pertinent Family Hx





Physical Exam


Vital Signs





Vital Signs - First Documented








 19





 15:10


 


Temp 36.9


 


Pulse 83


 


Resp 18


 


B/P (MAP) 134/101 (112)


 


Pulse Ox 100





Capillary Refill :


Height, Weight, BMI


Height: 5'3.00"


Weight: 120lbs. 0.0oz. [id number]; 22.00 BMI


Method:Stated


General Appearance:  other (disheveled, anxious)


HEENT:  PERRL/EOMI, TMs normal, other (severe dental caries with blackened 

stumps; atraumatic head but tender maxillary sinus on the right and right lower 

mandible to palpation. No abrasion or ecchymoses. No evidence of abscess. No 

Norris sign and raccoon eyes and nontender occiput)


Neck:  non-tender, full range of motion, supple, normal inspection


Respiratory:  lungs clear, normal breath sounds, no respiratory distress, no 

accessory muscle use


Cardiovascular:  normal peripheral pulses, regular rate, rhythm


Peripheral Pulses:  2+ Radial Pulses (R), 2+ Radial Pulses (L)


Gastrointestinal:  normal bowel sounds, non tender, soft


Neurologic/Psychiatric:  CNs II-XII nml as tested, no motor/sensory deficits, 

alert, normal mood/affect, other (oriented to person place current time but not 

previous situation)


Crainal Nerves:  normal hearing, normal speech, PERRL


Coordination/Gait:  normal gait


Skin:  normal color, warm/dry





Progress/Results/Core Measures


Results/Orders


Lab Results





Laboratory Tests








Test


 19


15:10 19


16:15 Range/Units


 


 


White Blood Count


 8.1 


 


 4.3-11.0


10^3/uL


 


Red Blood Count


 4.39 


 


 4.35-5.85


10^6/uL


 


Hemoglobin 10.3 L  11.5-16.0  G/DL


 


Hematocrit 33 L  35-52  %


 


Mean Corpuscular Volume 74 L  80-99  FL


 


Mean Corpuscular Hemoglobin 23 L  25-34  PG


 


Mean Corpuscular Hemoglobin


Concent 32 


 


 32-36  G/DL





 


Red Cell Distribution Width 16.7 H  10.0-14.5  %


 


Platelet Count


 311 


 


 130-400


10^3/uL


 


Mean Platelet Volume 10.7 H  7.4-10.4  FL


 


Neutrophils (%) (Auto) 77 H  42-75  %


 


Lymphocytes (%) (Auto) 15   12-44  %


 


Monocytes (%) (Auto) 7   0-12  %


 


Eosinophils (%) (Auto) 1   0-10  %


 


Basophils (%) (Auto) 0   0-10  %


 


Neutrophils # (Auto) 6.3   1.8-7.8  X 10^3


 


Lymphocytes # (Auto) 1.2   1.0-4.0  X 10^3


 


Monocytes # (Auto) 0.6   0.0-1.0  X 10^3


 


Eosinophils # (Auto)


 0.0 


 


 0.0-0.3


10^3/uL


 


Basophils # (Auto)


 0.0 


 


 0.0-0.1


10^3/uL


 


Sodium Level 140   135-145  MMOL/L


 


Potassium Level 3.8   3.6-5.0  MMOL/L


 


Chloride Level 109 H    MMOL/L


 


Carbon Dioxide Level 23   21-32  MMOL/L


 


Anion Gap 8   5-14  MMOL/L


 


Blood Urea Nitrogen 11   7-18  MG/DL


 


Creatinine


 0.80 


 


 0.60-1.30


MG/DL


 


Estimat Glomerular Filtration


Rate > 60 


 


  





 


BUN/Creatinine Ratio 14    


 


Glucose Level 90     MG/DL


 


Calcium Level 9.0   8.5-10.1  MG/DL


 


Corrected Calcium    8.5-10.1  MG/DL


 


Total Bilirubin 0.4   0.1-1.0  MG/DL


 


Aspartate Amino Transf


(AST/SGOT) 13 


 


 5-34  U/L





 


Alanine Aminotransferase


(ALT/SGPT) 10 


 


 0-55  U/L





 


Alkaline Phosphatase 54     U/L


 


C-Reactive Protein High


Sensitivity 0.04 


 


 0.00-0.50


MG/DL


 


Total Protein 7.4   6.4-8.2  GM/DL


 


Albumin 4.6 H  3.2-4.5  GM/DL


 


Salicylates Level < 5.0 L  5.0-20.0  MG/DL


 


Acetaminophen Level < 10 L  10-30  UG/ML


 


Serum Alcohol < 10   <10  MG/DL


 


Urine Color  RED H  


 


Urine Clarity  CLOUDY   


 


Urine pH  5.5  5-9  


 


Urine Specific Gravity  1.025 H 1.016-1.022  


 


Urine Protein  2+ H NEGATIVE  


 


Urine Glucose (UA)  NEGATIVE  NEGATIVE  


 


Urine Ketones  NEGATIVE  NEGATIVE  


 


Urine Nitrite  NEGATIVE  NEGATIVE  


 


Urine Bilirubin  NEGATIVE  NEGATIVE  


 


Urine Urobilinogen  0.2  < = 1.0  MG/DL


 


Urine Leukocyte Esterase  2+ H NEGATIVE  


 


Urine RBC (Auto)  3+ H NEGATIVE  


 


Urine RBC  TNTC H  /HPF


 


Urine WBC  2-5   /HPF


 


Urine Crystals  NONE   /LPF


 


Urine Bacteria  NEGATIVE   /HPF


 


Urine Casts  NONE   /LPF


 


Urine Mucus  NEGATIVE   /LPF


 


Urine Culture Indicated  NO   


 


Urine Opiates Screen  NEGATIVE  NEGATIVE  


 


Urine Oxycodone Screen  NEGATIVE  NEGATIVE  


 


Urine Methadone Screen  NEGATIVE  NEGATIVE  


 


Urine Propoxyphene Screen  NEGATIVE  NEGATIVE  


 


Urine Barbiturates Screen  NEGATIVE  NEGATIVE  


 


Ur Tricyclic Antidepressants


Screen 


 NEGATIVE 


 NEGATIVE  





 


Urine Phencyclidine Screen  NEGATIVE  NEGATIVE  


 


Urine Amphetamines Screen  NEGATIVE  NEGATIVE  


 


Urine Methamphetamines Screen  NEGATIVE  NEGATIVE  


 


Urine Benzodiazepines Screen  NEGATIVE  NEGATIVE  


 


Urine Cocaine Screen  NEGATIVE  NEGATIVE  


 


Urine Cannabinoids Screen  NEGATIVE  NEGATIVE  








My Orders





Orders - MORENA BLACK


Ct Head/Face/Cervical Wo (19 15:12)


Ed Iv/Invasive Line Start (19 15:12)


Ns Iv 500 Ml (Sodium Chloride 0.9%) (19 15:12)


Levetiracetam Injection (Keppra Injectio (19 15:12)


Ketorolac Injection (Toradol Injection) (19 15:15)


Cbc With Automated Diff (19 15:12)


Comprehensive Metabolic Panel (19 15:12)


Hs C Reactive Protein (19 15:12)


Ua Culture If Indicated (19 15:12)


Drug Screen Stat (Urine) (19 15:12)


Urine Pregnancy Bedside (19 15:12)


Alcohol (19 15:16)


Acetaminophen (19 15:16)


Salicylate (19 15:16)





Medications Given in ED





Current Medications








 Medications  Dose


 Ordered  Sig/Nestor


 Route  Start Time


 Stop Time Status Last Admin


Dose Admin


 


 Ketorolac


 Tromethamine  30 mg  ONCE  ONCE


 IVP  19 15:15


 19 15:16 DC 19 15:32


30 MG


 


 Sodium Chloride  500 ml @ 0


 mls/hr  Q0M ONCE


 IV  19 15:12


 19 15:15 DC 19 15:32


500 MLS/HR








Vital Signs/I&O











 19





 15:10


 


Temp 36.9


 


Pulse 83


 


Resp 18


 


B/P (MAP) 134/101 (112)


 


Pulse Ox 100











Progress


Progress Note :  


   Time:  15:23


Progress Note


Toradol for pain. CT of the head neck and face. Labs to include Tylenol, 

aspirin, alcohol urinalysis and drug screen looking for any evidence of self-

harm. Patient denies any suicidal ideation. Suspect she is postictal from her 

known seizure disorder. She says she is taking her medications appropriately so 

if we do not find something else wrong we can increase her Keppra 1500 mg twice 

a day. Plan to give her a gram of Keppra presently. Small fluid bolus. Previous 

records demonstrates urine amphetamines as well as anemia significant down to 

6.9 and 11.





Diagnostic Imaging





   Diagonstic Imaging:  CT (without IV contrast)


   Plain Films/CT/US/NM/MRI:  facial bones, c-spine, head


Comments


NAME:   NIKKI HUBBARD


MED REC#:   [id number]


ACCOUNT#:   [id number]


PT STATUS:   REG ER


:   1995


PHYSICIAN:   MORENA BLACK MD


ADMIT DATE:   19/ER


                                   ***Draft***


POSDate of Exam:19





CT HEAD/FACE/CERVICAL WO





PROCEDURE: CT head, face, and cervical spine without contrast.





TECHNIQUE: Multiple contiguous axial images were obtained through


the head, neck, and facial bones without the use of intravenous


contrast. Sagittal and coronal reformations through the cervical


spine and facial bones were also performed. Auto Exposure


Controls were utilized during the CT exam to meet ALARA standards


for radiation dose reduction. 





INDICATION: History of seizures. Had seizure today, fall with


frontal jaw pain.





CORRELATION STUDY: 10/08/2019.





FINDINGS:


CT head: Ventricular apex is incompletely imaged. The visualized


ventricles and sulci appearing unremarkable. Normal gray-white


differentiation. No abnormal areas of decreased attenuation to


suggest edema. No midline shift or mass effect. No intracranial


hemorrhage. The visualized bony calvarium appearing unremarkable.


Visualized paranasal sinuses are relatively clear. Hypoplasia


about the mastoid air cells.





CT maxillofacial: There is no acute displaced maxillofacial


fracture deformity. Nasal bones are intact. Mild-to-moderate


leftward nasal septal deviation. The paranasal sinuses


demonstrate no significant air-fluid level. Small cysts or polyps


in bilateral maxillary sinuses. The temporomandibular joint is


maintained with mandible intact. There is demonstration of


multiple lucencies through the teeth, most compatible with dental


caries versus potential fractured teeth.





CT cervical spine: Cervical spine alignment demonstrates some


straightening and reversal but otherwise maintained and intact.


Vertebral body heights are preserved. The posterior elements are


intact and in normal alignment. Odontoid is intact. Lateral


masses of C1-C2 are aligned.





IMPRESSION:


CT head:


1.  Negative for acute traumatic intracranial abnormality.





CT maxillofacial:


1. Negative for acute displaced maxillofacial fracture deformity.


Multiple scattered dental caries are present.





CT cervical spine:


1.  Negative for acute fracture or traumatic subluxation.





  Dictated on workstation # [id number]





Dict:   [id number]


Trans:   19 1717


[id number]





Interpreted by:     MEGAN FUENTES DO


Electronically signed by:


   Reviewed:  Reviewed by Me





Departure


Impression





   Primary Impression:  


   Seizure


   Additional Impression:  


   Facial pain, acute


Disposition:  01 HOME, SELF-CARE


Condition:  Stable





Departure-Patient Inst.


Decision time for Depature:  17:58


Referrals:  


CANDELARIA COOPER MD (PCP/Family)


Primary Care Physician


Patient Instructions:  Seizures, Adult (DC)





Add. Discharge Instructions:  


Start taking one and a half tablets of your Keppra until you follow up with your

primary care doctor.


Do not drive until released by your primary care doctor or 1 year out from your 

last seizure.





Copy


Copies To 1:   CANDELARIA COOPER MD, TITUS J                 2019 15:16


POS

## 2019-12-01 ENCOUNTER — HOSPITAL ENCOUNTER (EMERGENCY)
Dept: HOSPITAL 75 - ER | Age: 24
Discharge: HOME | End: 2019-12-01
Payer: MEDICAID

## 2019-12-01 VITALS — SYSTOLIC BLOOD PRESSURE: 142 MMHG | DIASTOLIC BLOOD PRESSURE: 92 MMHG

## 2019-12-01 VITALS — HEIGHT: 62.99 IN | WEIGHT: 137.35 LBS | BODY MASS INDEX: 24.34 KG/M2
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
## 2019-12-01 DIAGNOSIS — S00.83XA: Primary | ICD-10-CM

## 2019-12-01 DIAGNOSIS — G40.909: ICD-10-CM

## 2019-12-01 DIAGNOSIS — K05.6: ICD-10-CM

## 2019-12-01 DIAGNOSIS — Z79.52: ICD-10-CM

## 2019-12-01 DIAGNOSIS — I10: ICD-10-CM

## 2019-12-01 LAB
ALBUMIN SERPL-MCNC: 4.8 GM/DL (ref 3.2–4.5)
ALP SERPL-CCNC: 56 U/L (ref 40–136)
ALT SERPL-CCNC: 10 U/L (ref 0–55)
APTT PPP: YELLOW S
BACTERIA #/AREA URNS HPF: (no result) /HPF
BARBITURATES UR QL: NEGATIVE
BASOPHILS # BLD AUTO: 0 10^3/UL (ref 0–0.1)
BASOPHILS NFR BLD AUTO: 0 % (ref 0–10)
BENZODIAZ UR QL SCN: NEGATIVE
BILIRUB SERPL-MCNC: 0.4 MG/DL (ref 0.1–1)
BILIRUB UR QL STRIP: NEGATIVE
BUN/CREAT SERPL: 13
CALCIUM SERPL-MCNC: 9.5 MG/DL (ref 8.5–10.1)
CHLORIDE SERPL-SCNC: 107 MMOL/L (ref 98–107)
CO2 SERPL-SCNC: 22 MMOL/L (ref 21–32)
COCAINE UR QL: NEGATIVE
CREAT SERPL-MCNC: 0.83 MG/DL (ref 0.6–1.3)
EOSINOPHIL # BLD AUTO: 0.1 10^3/UL (ref 0–0.3)
EOSINOPHIL NFR BLD AUTO: 1 % (ref 0–10)
ERYTHROCYTE [DISTWIDTH] IN BLOOD BY AUTOMATED COUNT: 15.9 % (ref 10–14.5)
FIBRINOGEN PPP-MCNC: CLEAR MG/DL
GFR SERPLBLD BASED ON 1.73 SQ M-ARVRAT: > 60 ML/MIN
GLUCOSE SERPL-MCNC: 86 MG/DL (ref 70–105)
GLUCOSE UR STRIP-MCNC: NEGATIVE MG/DL
HCT VFR BLD CALC: 34 % (ref 35–52)
HGB BLD-MCNC: 10.6 G/DL (ref 11.5–16)
KETONES UR QL STRIP: NEGATIVE
LEUKOCYTE ESTERASE UR QL STRIP: NEGATIVE
LYMPHOCYTES # BLD AUTO: 2.3 X 10^3 (ref 1–4)
LYMPHOCYTES NFR BLD AUTO: 23 % (ref 12–44)
MAGNESIUM SERPL-MCNC: 2 MG/DL (ref 1.6–2.4)
MANUAL DIFFERENTIAL PERFORMED BLD QL: NO
MCH RBC QN AUTO: 23 PG (ref 25–34)
MCHC RBC AUTO-ENTMCNC: 32 G/DL (ref 32–36)
MCV RBC AUTO: 75 FL (ref 80–99)
METHADONE UR QL SCN: NEGATIVE
METHAMPHETAMINE SCREEN URINE S: NEGATIVE
MONOCYTES # BLD AUTO: 0.8 X 10^3 (ref 0–1)
MONOCYTES NFR BLD AUTO: 7 % (ref 0–12)
NEUTROPHILS # BLD AUTO: 7 X 10^3 (ref 1.8–7.8)
NEUTROPHILS NFR BLD AUTO: 69 % (ref 42–75)
NITRITE UR QL STRIP: NEGATIVE
OPIATES UR QL SCN: NEGATIVE
OXYCODONE UR QL: NEGATIVE
PH UR STRIP: 5.5 [PH] (ref 5–9)
PLATELET # BLD: 377 10^3/UL (ref 130–400)
PMV BLD AUTO: 10.7 FL (ref 7.4–10.4)
POTASSIUM SERPL-SCNC: 3.8 MMOL/L (ref 3.6–5)
PROPOXYPH UR QL: NEGATIVE
PROT SERPL-MCNC: 7.8 GM/DL (ref 6.4–8.2)
PROT UR QL STRIP: NEGATIVE
RBC #/AREA URNS HPF: (no result) /HPF
SODIUM SERPL-SCNC: 140 MMOL/L (ref 135–145)
SP GR UR STRIP: >=1.03 (ref 1.02–1.02)
TRICYCLICS UR QL SCN: NEGATIVE
WBC # BLD AUTO: 10.2 10^3/UL (ref 4.3–11)
WBC #/AREA URNS HPF: (no result) /HPF

## 2019-12-01 PROCEDURE — 83735 ASSAY OF MAGNESIUM: CPT

## 2019-12-01 PROCEDURE — 80053 COMPREHEN METABOLIC PANEL: CPT

## 2019-12-01 PROCEDURE — 70450 CT HEAD/BRAIN W/O DYE: CPT

## 2019-12-01 PROCEDURE — 85025 COMPLETE CBC W/AUTO DIFF WBC: CPT

## 2019-12-01 PROCEDURE — 70486 CT MAXILLOFACIAL W/O DYE: CPT

## 2019-12-01 PROCEDURE — 80306 DRUG TEST PRSMV INSTRMNT: CPT

## 2019-12-01 PROCEDURE — 84703 CHORIONIC GONADOTROPIN ASSAY: CPT

## 2019-12-01 PROCEDURE — 81000 URINALYSIS NONAUTO W/SCOPE: CPT

## 2019-12-01 PROCEDURE — 87088 URINE BACTERIA CULTURE: CPT

## 2019-12-01 PROCEDURE — 36415 COLL VENOUS BLD VENIPUNCTURE: CPT

## 2019-12-01 PROCEDURE — 72125 CT NECK SPINE W/O DYE: CPT

## 2019-12-01 NOTE — DIAGNOSTIC IMAGING REPORT
PROCEDURE: CT head, face, and cervical spine without contrast.



TECHNIQUE: Multiple contiguous axial images were obtained through

the head, neck, and facial bones without the use of intravenous

contrast. Sagittal and coronal reformations through the cervical

spine and facial bones were also performed. Auto Exposure

Controls were utilized during the CT exam to meet ALARA standards

for radiation dose reduction. 



DATE: December 1, 2019.



COMPARISON: November 18, 2019. 



INDICATION: 24-year-old female, seizure, fall.



FINDINGS: The ventricles and cerebral spinal fluid spaces are of

normal size and configuration for the patient's age. There is no

mass effect or midline shift. There is no acute intracranial

hemorrhage. There is no abnormal extra-axial fluid collection.

The visualized portions of the paranasal sinuses, mastoid air

cells and middle ears are well aerated.



The temporomandibular joints are normally aligned. The mandible

is intact. There are periapical lucencies adjacent to maxillary

teeth just to the right and left of midline. There is no

aggressive bone destruction. There is no periosteal reaction.

There is no displaced nasal bone fracture. The bony nasal septum

is deviated to the left of midline. There is a polypoid lesion in

the left maxillary sinus likely relating to a mucous retention

cyst. There is no air fluid level in the paranasal sinuses. There

is no identified acute maxillofacial bone fracture.



There is a slight reversal of the normal cervical lordosis. There

is no facet joint subluxation or dislocation. There is no

asymmetric widening of the cervical disc spaces. There is no

prominent prevertebral soft tissue swelling. There is no

identified acute fracture of the cervical spine.



A CT is limited for assessment of disc pathology as well as

additional non-bony causes of foraminal and spinal stenosis.



The visualized portions of lung apices are clear.



IMPRESSION:

1. No identified acute intracranial abnormality.

2. No identified acute maxillofacial bone fracture.

3. Periapical lucencies adjacent to maxillary teeth to the right

and left of midline without aggressive bone destruction or

periodic reaction.

4. No acute abnormality of the cervical spine. 



Dictated by: 



  Dictated on workstation # [id number]

## 2019-12-01 NOTE — ED NEUROLOGICAL PROBLEM
General


Chief Complaint:  Neurological Problems


Stated Complaint:  SEIZURE/FALL


Nursing Triage Note:  


pt presents to the ed from [facility]. bystanders report to ems staff that the pt 


was witnessed to have collapsed and witnessed having seizure like activity for a




reported ten minuted. pt denies neck pain or tenderness on provider exam, 


verbalizes left jaw pain


Nursing Sepsis Screen:  No Definite Risk


Source:  patient, EMS


Exam Limitations:  no limitations





History of Present Illness


Date Seen by Provider:  Dec 1, 2019


Time Seen by Provider:  20:38


Initial Comments


This 24-year-old young lady presents to the emergency room via [facility] 

EMS with complaint of seizure.  She has known seizure disorder.  EMS reports she

was standing up at [facility] when she fell forward and had a prolonged seizure.  

They state by standers reported seizure lasted up to 10 minutes.  Patient 

complains of left jaw pain.  She has some mild tenderness on the cervical spine.

 C-collar was placed by EMS on scene.  Patient states she has been experiencing 

no acute illnesses recently and has been compliant with her medications.  She 

was postictal for EMS but is alert and oriented now.  Dr. Cooper is her primary 

care provider.  Patient took her Keppra just prior to going to [facility].  

Medication filling record shows that patient was recently prescribed amoxicillin

and chlorhexidine mouthwash.  She does not recall being prescribed this 

medication or taking any of it.





Allergies and Home Medications


Allergies


Coded Allergies:  


     No Known Drug Allergies (Unverified , 10/12/15)





Home Medications


Amoxicillin 500 Mg Capsule, 1,000 MG PO BID


   Prescribed by: ARJUN KNOWLES on 197


Levetiracetam 500 Mg Tablet, 500 MG PO BID


   Prescribed by: ARJUN KNOWLES on 1/10/17 0658


Prednisone 20 Mg Tab, 40 MG PO DAILY


   Prescribed by: KADY MCGREGOR on 19 1510





Patient Home Medication List


Home Medication List Reviewed:  Yes





Review of Systems


Review of Systems


Constitutional:  no symptoms reported


Eyes:  No Symptoms Reported


Ears, Nose, Mouth, Throat:  no symptoms reported


Respiratory:  no symptoms reported


Cardiovascular:  no symptoms reported


Gastrointestinal:  no symptoms reported


Genitourinary:  no symptoms reported


Pregnant:  No


Musculoskeletal:  see HPI


Skin:  no symptoms reported


Psychiatric/Neurological:  See HPI


Endocrine:  No Symptoms Reported


Hematologic/Lymphatic:  No Symptoms Reported





Past Medical-Social-Family Hx


Past Med/Social Hx:  Reviewed and Corrections made


Patient Social History


Alcohol Use:  Denies Use


Recreational Drug Use:  No


Drug of Choice:  DENIES BUT UDS + FOR AMPHETAMINES


Smoking Status:  Never a Smoker


Type Used:  Cigarettes


2nd Hand Smoke Exposure:  No


Recent Foreign Travel:  No


Contact w/Someone Who Travel:  No


Recent Infectious Disease Expo:  No


Recent Hopitalizations:  No


Physical Abuse:  No


Sexual Abuse:  No


Mistreated:  No


Fear:  No





Immunizations Up To Date


Tetanus Booster (TDap):  Unknown


PED Vaccines UTD:  Yes





Seasonal Allergies


Seasonal Allergies:  No





Past Medical History


Surgeries:  Yes ( 19)


 Section


Respiratory:  No


Cardiac:  Yes


Hypertension


Neurological:  Yes


Seizure Disorder


Pregnant:  No


Last Menstrual Period:  2019


Reproductive Disorders:  No


Genitourinary:  No


Gastrointestinal:  No


Musculoskeletal:  No


Endocrine:  No


HEENT:  No


Cancer:  No


Psychosocial:  No


Integumentary:  No


Blood Disorders:  No


Adverse Reaction/Blood Tranf:  No





Family Medical History





Patient reports no known family medical history.


No Pertinent Family Hx





Physical Exam


Vital Signs





Vital Signs - First Documented








 19





 20:36


 


Pulse 90


 


Resp 18


 


B/P (MAP) 150/106 (121)


 


Pulse Ox 99


 


O2 Delivery Room Air





Capillary Refill : Less Than 3 Seconds


Height, Weight, BMI


Height: 5'3.00"


Weight: 120lbs. 0.0oz. [id number]; 24.00 BMI


Method:Stated


General Appearance:  WD/WN, no apparent distress


HEENT:  PERRL/EOMI, other (severe dental decay.  Tenderness along the left 

mandible)


Neck:  normal inspection, tender midline (posteriorly), other (in c-collar)


Respiratory:  lungs clear, normal breath sounds, no respiratory distress, no 

accessory muscle use


Cardiovascular:  regular rate, rhythm, no edema, no murmur


Gastrointestinal:  normal bowel sounds, non tender, soft


Extremities:  non-tender, normal inspection, no pedal edema


Neurologic/Psychiatric:  CNs II-XII nml as tested, no motor/sensory deficits, 

alert, normal mood/affect, oriented x 3


Crainal Nerves:  normal hearing, normal speech, PERRL


Motor/Sensory:  no motor deficit, no sensory deficit


Skin:  normal color, warm/dry





Progress/Results/Core Measures


Results/Orders


Lab Results





Laboratory Tests








Test


 19


20:47 19


21:53 19


21:56 Range/Units


 


 


White Blood Count


 10.2 


 


 


 4.3-11.0


10^3/uL


 


Red Blood Count


 4.52 


 


 


 4.35-5.85


10^6/uL


 


Hemoglobin 10.6 L   11.5-16.0  G/DL


 


Hematocrit 34 L   35-52  %


 


Mean Corpuscular Volume 75 L   80-99  FL


 


Mean Corpuscular Hemoglobin 23 L   25-34  PG


 


Mean Corpuscular Hemoglobin


Concent 32 


 


 


 32-36  G/DL





 


Red Cell Distribution Width 15.9 H   10.0-14.5  %


 


Platelet Count


 377 


 


 


 130-400


10^3/uL


 


Mean Platelet Volume 10.7 H   7.4-10.4  FL


 


Neutrophils (%) (Auto) 69    42-75  %


 


Lymphocytes (%) (Auto) 23    12-44  %


 


Monocytes (%) (Auto) 7    0-12  %


 


Eosinophils (%) (Auto) 1    0-10  %


 


Basophils (%) (Auto) 0    0-10  %


 


Neutrophils # (Auto) 7.0    1.8-7.8  X 10^3


 


Lymphocytes # (Auto) 2.3    1.0-4.0  X 10^3


 


Monocytes # (Auto) 0.8    0.0-1.0  X 10^3


 


Eosinophils # (Auto)


 0.1 


 


 


 0.0-0.3


10^3/uL


 


Basophils # (Auto)


 0.0 


 


 


 0.0-0.1


10^3/uL


 


Sodium Level 140    135-145  MMOL/L


 


Potassium Level 3.8    3.6-5.0  MMOL/L


 


Chloride Level 107      MMOL/L


 


Carbon Dioxide Level 22    21-32  MMOL/L


 


Anion Gap 11    5-14  MMOL/L


 


Blood Urea Nitrogen 11    7-18  MG/DL


 


Creatinine


 0.83 


 


 


 0.60-1.30


MG/DL


 


Estimat Glomerular Filtration


Rate > 60 


 


 


  





 


BUN/Creatinine Ratio 13     


 


Glucose Level 86      MG/DL


 


Calcium Level 9.5    8.5-10.1  MG/DL


 


Corrected Calcium     8.5-10.1  MG/DL


 


Magnesium Level 2.0    1.6-2.4  MG/DL


 


Total Bilirubin 0.4    0.1-1.0  MG/DL


 


Aspartate Amino Transf


(AST/SGOT) 13 


 


 


 5-34  U/L





 


Alanine Aminotransferase


(ALT/SGPT) 10 


 


 


 0-55  U/L





 


Alkaline Phosphatase 56      U/L


 


Total Protein 7.8    6.4-8.2  GM/DL


 


Albumin 4.8 H   3.2-4.5  GM/DL


 


Serum Pregnancy Test,


Qualitative NEGATIVE 


 


 


 NEGATIVE  





 


Urine Opiates Screen  NEGATIVE   NEGATIVE  


 


Urine Oxycodone Screen  NEGATIVE   NEGATIVE  


 


Urine Methadone Screen  NEGATIVE   NEGATIVE  


 


Urine Propoxyphene Screen  NEGATIVE   NEGATIVE  


 


Urine Barbiturates Screen  NEGATIVE   NEGATIVE  


 


Ur Tricyclic Antidepressants


Screen 


 NEGATIVE 


 


 NEGATIVE  





 


Urine Phencyclidine Screen  NEGATIVE   NEGATIVE  


 


Urine Amphetamines Screen  NEGATIVE   NEGATIVE  


 


Urine Methamphetamines Screen  NEGATIVE   NEGATIVE  


 


Urine Benzodiazepines Screen  NEGATIVE   NEGATIVE  


 


Urine Cocaine Screen  NEGATIVE   NEGATIVE  


 


Urine Cannabinoids Screen  NEGATIVE   NEGATIVE  


 


Urine Color   YELLOW   


 


Urine Clarity   CLEAR   


 


Urine pH   5.5  5-9  


 


Urine Specific Gravity   >=1.030  1.016-1.022  


 


Urine Protein   NEGATIVE  NEGATIVE  


 


Urine Glucose (UA)   NEGATIVE  NEGATIVE  


 


Urine Ketones   NEGATIVE  NEGATIVE  


 


Urine Nitrite   NEGATIVE  NEGATIVE  


 


Urine Bilirubin   NEGATIVE  NEGATIVE  


 


Urine Urobilinogen   0.2  < = 1.0  MG/DL


 


Urine Leukocyte Esterase   NEGATIVE  NEGATIVE  


 


Urine RBC (Auto)   NEGATIVE  NEGATIVE  


 


Urine RBC   RARE   /HPF


 


Urine WBC   0-2   /HPF


 


Urine Squamous Epithelial


Cells 


 


 10-25 H


  /HPF





 


Urine Crystals   NONE   /LPF


 


Urine Bacteria   LARGE H  /HPF


 


Urine Casts   NONE   /LPF


 


Urine Mucus   NEGATIVE   /LPF


 


Urine Culture Indicated   YES   








My Orders





Orders - ARJUN SUAREZ MD


Cbc With Automated Diff (19 20:43)


Comprehensive Metabolic Panel (19 20:43)


Hcg,Qualitative Serum (19 20:43)


Magnesium (19 20:43)


Ua Culture If Indicated (19 20:43)


Ct Head/Face/Cervical Wo (19 20:43)


Drug Screen Stat (Urine) (19 21:53)


Amoxicillin Capsule (Polymox Capsule) (19 22:15)


Urine Culture (19 21:56)





Vital Signs/I&O











 19





 20:36


 


Pulse 90


 


Resp 18


 


B/P (MAP) 150/106 (121)


 


Pulse Ox 99


 


O2 Delivery Room Air














Blood Pressure Mean:                    121


POS








Progress


Progress Note :  


Progress Note


Labs unremarkable.  CT suggestive of periodontal disease but no acute injuries. 

C-collar was cleared.  Patient is being treated for periodontal disease as this 

may be decreasing her seizure threshold.





Diagnostic Imaging





   Diagonstic Imaging:  CT


   Plain Films/CT/US/NM/MRI:  facial bones, c-spine, head


Comments


CT viewed by me and report reviewed.  See report below:





NAME:   NIKKI HUBBARD  


MED REC#:   [id number]  


ACCOUNT#:   [id number]  


PT STATUS:   REG ER  


:   1995  


PHYSICIAN:   ARJUN SUAREZ MD  


ADMIT DATE:   19/ER  


                                                                      

***Draft***  


POSDate of Exam:19  


 


CT HEAD/FACE/CERVICAL WO  


 


PROCEDURE: CT head, face, and cervical spine without contrast.  


 


 TECHNIQUE: Multiple contiguous axial images were obtained through  


 the head, neck, and facial bones without the use of intravenous  


 contrast. Sagittal and coronal reformations through the cervical  


 spine and facial bones were also performed. Auto Exposure  


 Controls were utilized during the CT exam to meet ALARA standards  


 for radiation dose reduction.   


 


 DATE: 2019.  


 


 COMPARISON: 2019.   


 


 INDICATION: 24-year-old female, seizure, fall.  


 


 FINDINGS: The ventricles and cerebral spinal fluid spaces are of  


 normal size and configuration for the patient's age. There is no  


 mass effect or midline shift. There is no acute intracranial  


 hemorrhage. There is no abnormal extra-axial fluid collection.  


 The visualized portions of the paranasal sinuses, mastoid air  


 cells and middle ears are well aerated.  


 


 The temporomandibular joints are normally aligned. The mandible  


 is intact. There are periapical lucencies adjacent to maxillary  


 teeth just to the right and left of midline. There is no  


 aggressive bone destruction. There is no periosteal reaction.  


 There is no displaced nasal bone fracture. The bony nasal septum  


 is deviated to the left of midline. There is a polypoid lesion in  


 the left maxillary sinus likely relating to a mucous retention  


 cyst. There is no air fluid level in the paranasal sinuses. There  


 is no identified acute maxillofacial bone fracture.  


 


 There is a slight reversal of the normal cervical lordosis. There  


 is no facet joint subluxation or dislocation. There is no  


 asymmetric widening of the cervical disc spaces. There is no  


 prominent prevertebral soft tissue swelling. There is no  


 identified acute fracture of the cervical spine.  


 


 A CT is limited for assessment of disc pathology as well as  


 additional non-bony causes of foraminal and spinal stenosis.  


 


 The visualized portions of lung apices are clear.  


 


 IMPRESSION:  


 1. No identified acute intracranial abnormality.  


 2. No identified acute maxillofacial bone fracture.  


 3. Periapical lucencies adjacent to maxillary teeth to the right  


 and left of midline without aggressive bone destruction or  


 periodic reaction.  


 4. No acute abnormality of the cervical spine.   


 


   Dictated on workstation # [id number]  


 


Dict:   19  


Trans:   19  


[facility] [id number]  


 


Interpreted by:     KIKE CASTELAN MD





Departure


Impression





   Primary Impression:  


   Seizure


   Additional Impressions:  


   Facial contusion


   Qualified Codes:  S00.83XA - Contusion of other part of head, initial 

   encounter


   Periodontal disease


Disposition:   HOME, SELF-CARE


Condition:  Improved





Departure-Patient Inst.


Decision time for Depature:  22:04


Referrals:  


CANDELARIA COOPER MD (PCP/Family)


Primary Care Physician


Patient Instructions:  Periodontal Disease, Seizures, Adult (DC)





Add. Discharge Instructions:  


Complete your antibiotics as prescribed and follow-up with a dentist as soon as 

possible.


Continue your Keppra as previously prescribed and follow-up with your primary 

care provider soon as possible.


Brush your teeth gently twice daily with a soft bristle brush and rinse with an 

antiseptic mouthwash such as the chlorhexidine mouthwash you were previously 

prescribed.


Return to care if you have any further problems or concerns.














All discharge instructions reviewed with patient and/or family. Voiced 

understanding.


Scripts


Amoxicillin (Amoxicillin) 500 Mg Capsule


1000 MG PO BID, #40 CAP 0 Refills


   Prov: ARJUN SUAREZ MD         19











ARJUN SUAREZ MD         Dec 1, 2019 21:53


POS

## 2019-12-26 NOTE — XMS REPORT
Continuity of Care Document

                             Created on: 2019



Prerna Massey

External Reference #: [id number]

: 1995

Sex: Female



Demographics





                          Address                   [address] [place]  [address]

 

                          Home Phone                [phone] x

 

                          Preferred Language        Unknown

 

                          Marital Status            Unknown

 

                          Jew Affiliation     Unknown

 

                          Race                      Unknown

 

                          Ethnic Group              Unknown





Author





                          Organization              Unknown

 

                          Address                   Unknown

 

                          Phone                     Unavailable



              



Allergies

      



             Active           Description           Code           Type         

  Severity   

                Reaction           Onset           Reported/Identified          

 

Relationship to Patient                 Clinical Status        

 

                Yes             No Known Drug Allergies           [id number]    

       Drug 

Allergy           Unknown           N/A                             10/12/2015  

      

                                                             



                  



Medications

      



There is no data.                  



Problems

      



             Date Dx Coded           Attending           Type           Code    

       

Diagnosis                               Diagnosed By        

 

                10/12/2015           DAYANA BLANKENSHIP           Ot           

   O23.41          

                          UNSP INFCT OF URINARY TRACT IN PREGNANCY              

      

 

             10/12/2015           DAYANA BLANKENSHIP           Ot           R11

.2           

NAUSEA WITH VOMITING, UNSPECIFIED                    

 

                10/12/2015           DAYANA BLANKENSHIP           Ot           

   Z3A.11          

                          11 WEEKS GESTATION OF PREGNANCY                    

 

             2015           CANDELARIA COOPER MD, Ot           Z36 

             

                                                 

 

             2015           CANDELARIA COOPER MD, Ot           Z36 

             

                                                 

 

             2015           CANDELARIA COOPER MD, Ot           Z36 

             

                                                 

 

             01/15/2016           CANDELARIA COOPER MD, Ot           Z36 

             

                                                 

 

             2016           CANDELARIA COOPER MD, Ot           Z36 

             

                                                 

 

             04/15/2016           CANDELARIA COOPER MD, Ot           O44.

13           

PLACENTA PREVIA WITH HEMORRHAGE, THIRD T                    

 

             04/15/2016           CANDELARIA COOPER MD, Ot           Z3A.

00           

WEEKS OF GESTATION OF PREGNANCY NOT SPEC                    

 

                2016           DEDE PATRICK MD           Ot           

   O99.89          

                          OTH DISEASES AND CONDITIONS COMPL PREG/C              

      

 

             2016           DEDE PATRICK MD           Ot           R10

.9           

UNSPECIFIED ABDOMINAL PAIN                       

 

                2016           DEDE PATRICK MD           Ot           

   Z3A.37          

                          37 WEEKS GESTATION OF PREGNANCY                    

 

             2016           CANDELARIA OCOPER MD, Ot           O26.

23           

PREG CARE FOR PATIENT W RECURRENT PREG L                    

 

             2016           CANDELARIA COOPER MD, Ot           Z3A.

32           

32 WEEKS GESTATION OF PREGNANCY                    

 

             2016           CANDELARIA COOPER MD, Ot           O73.

0           

RETAINED PLACENTA WITHOUT HEMORRHAGE                    

 

             2016           CANDELARIA COOPER MD, Ot           R50.

9           

FEVER, UNSPECIFIED                               

 

             2016           CANDELARIA COOPER MD, Ot           Z23 

          

ENCOUNTER FOR IMMUNIZATION                       

 

             2016           CANDELARIA COOPER MD, Ot           Z37.

0           

SINGLE LIVE BIRTH                                

 

             2016           CANDELARIA COOPER MD, Ot           Z3A.

38           

38 WEEKS GESTATION OF PREGNANCY                    

 

             07/10/2016           CANDELARIA COOPER MD, Ot           O26.

23           

PREG CARE FOR PATIENT W RECURRENT PREG L                    

 

             07/10/2016           CANDELARIA COOPER MD, Ot           Z3A.

32           

32 WEEKS GESTATION OF PREGNANCY                    

 

             2017           CANDELARIA COOPER MD, Ot           Z36 

          

ENCOUNTER FOR  SCREENING OF MOT                    

 

             2017           CANDELARIA COOPER MD, Ot           O44.

13           

PLACENTA PREVIA WITH HEMORRHAGE, THIRD T                    

 

             2017           CANDELARIA COOPER MD, Ot           Z3A.

00           

WEEKS OF GESTATION OF PREGNANCY NOT SPEC                    

 

             2017           CANDELARIA COOPER MD, Ot           O26.

23           

PREG CARE FOR PATIENT W RECURRENT PREG L                    

 

             2017           CANDELARIA COOPER MD, Ot           Z3A.

32           

32 WEEKS GESTATION OF PREGNANCY                    

 

                2017           ARJUN SUAREZ MD           Ot      

        G40.909    

                          EPILEPSY, UNSP, NOT INTRACTABLE, WITHOUT              

      

 

                2017           ARJUN SUAREZ MD           Ot      

        R56.9      

                          UNSPECIFIED CONVULSIONS                    

 

                2017           ARJUN SUAREZ MD           Ot      

        S40.011A   

                          CONTUSION OF RIGHT SHOULDER, INITIAL ENC              

      

 

                2017           ARJUN SUAREZ MD           Ot      

        W18.30XA   

                          FALL ON SAME LEVEL, UNSPECIFIED, INITIAL              

      

 

                2017           ARJUN SUAREZ MD           Ot      

        Y92.009    

                          UNSP PLACE IN UNSP NON-INSTITUT (PRIVATE              

      

 

                2017           ARJUN SUAREZ MD           Ot      

        Y99.8      

                          OTHER EXTERNAL CAUSE STATUS                    

 

             2017           CANDELARIA COOPER MD, Ot           Z36 

          

ENCOUNTER FOR  SCREENING OF MOT                    

 

             2017           CANDELARIA COOPER MD, Ot           O44.

13           

PLACENTA PREVIA WITH HEMORRHAGE, THIRD T                    

 

             2017           CANDELARIA COOPER MD, Ot           Z3A.

00           

WEEKS OF GESTATION OF PREGNANCY NOT SPEC                    

 

             2017           CANDELARIA COOPER MD, Ot           O26.

23           

PREG CARE FOR PATIENT W RECURRENT PREG L                    

 

             2017           CANDELARIA COOPER MD, Ot           Z3A.

32           

32 WEEKS GESTATION OF PREGNANCY                    

 

             2017           CANDELARIA COOPER MD, Ot           R56.

9           

UNSPECIFIED CONVULSIONS                          

 

             2017           CANDELARIA OCOPER MD, Ot           R56.

9           

UNSPECIFIED CONVULSIONS                          

 

             10/26/2017           CANDELARIA COOPER MD, Ot           Z36 

          

ENCOUNTER FOR  SCREENING OF MOT                    

 

             10/26/2017           CANDELARIA COOPER MD, Ot           O44.

13           

PLACENTA PREVIA WITH HEMORRHAGE, THIRD T                    

 

             10/26/2017           CANDELARIA COOPER MD, Ot           Z3A.

00           

WEEKS OF GESTATION OF PREGNANCY NOT SPEC                    

 

             10/26/2017           CANDELARIA COOPER MD, Ot           O26.

23           

PREG CARE FOR PATIENT W RECURRENT PREG L                    

 

             10/26/2017           CANDELARIA COOPER MD, Ot           Z3A.

32           

32 WEEKS GESTATION OF PREGNANCY                    

 

             10/26/2017           CANDELARIA COOPER MD, Ot           R56.

9           

UNSPECIFIED CONVULSIONS                          

 

             10/30/2017           CANDELARIA COOPER MD, Ot           Z36 

          

ENCOUNTER FOR  SCREENING OF MOT                    

 

             10/30/2017           CANDELARIA COOPER MD, Ot           O44.

13           

PLACENTA PREVIA WITH HEMORRHAGE, THIRD T                    

 

             10/30/2017           CANDELARIA COOPER MD, Ot           Z3A.

00           

WEEKS OF GESTATION OF PREGNANCY NOT SPEC                    

 

             10/30/2017           CANDELARIA COOPER MD, Ot           O26.

23           

PREG CARE FOR PATIENT W RECURRENT PREG L                    

 

             10/30/2017           CANDELARIA COOPER MD, Ot           Z3A.

32           

32 WEEKS GESTATION OF PREGNANCY                    

 

             10/30/2017           CANDELARIA COOPER MD, Ot           R56.

9           

UNSPECIFIED CONVULSIONS                          

 

             2017           CANDELARIA COOPER MD, Ot           Z36.

87           

ENCOUNTER FOR  SCREENING FOR UN                    

 

             2017           CANDELARIA COOPER MD, Ot           Z3A.

01           

LESS THAN 8 WEEKS GESTATION OF PREGNANCY                    

 

                2017           ARJUN SUAREZ MD           Ot      

        G40.909    

                          EPILEPSY, UNSP, NOT INTRACTABLE, WITHOUT              

      

 

                2017           ARJUN SUAREZ MD           Ot      

        H53.2      

                          DIPLOPIA                           

 

                2017           ARJUN SUAREZ MD           Ot      

        I10        

                          ESSENTIAL (PRIMARY) HYPERTENSION                    

 

                2017           ARJUN SUAREZ MD           Ot      

        O16.2      

                          UNSPECIFIED MATERNAL HYPERTENSION, SECON              

      

 

                2017           ARJUN SUAREZ MD           Ot      

        O99.352    

                          DISEASES OF THE NERVOUS SYS COMP PREGNAN              

      

 

                2017           ARJUN SUAREZ MD           Ot      

        O99.89     

                          OTH DISEASES AND CONDITIONS COMPL PREG/C              

      

 

                2017           ARJUN SUAREZ MD           Ot      

        R51        

                          HEADACHE                           

 

                2017           ARJUN SUAREZ MD           Ot      

        Z3A.15     

                          15 WEEKS GESTATION OF PREGNANCY                    

 

             01/15/2018           CANDELARIA COOPER MD, Ot           Z36.

89           

ENCOUNTER FOR OTHER SPECIFIED                      

 

             01/15/2018           CANDELARIA COOPER MD, Ot           Z3A.

18           

18 WEEKS GESTATION OF PREGNANCY                    

 

             2018           CANDELARIA COOPER MD, Ot           Z36.

89           

ENCOUNTER FOR OTHER SPECIFIED                      

 

             2018           CANDELARIA COOPER MD, Ot           Z3A.

18           

18 WEEKS GESTATION OF PREGNANCY                    

 

             2018           CANDELARIA COOPER MD, Ot           N85.

9           

NONINFLAMMATORY DISORDER OF UTERUS, UNSP                    

 

             2018           CANDELARIA COOPER MD, Ot           O99.

89           

OTH DISEASES AND CONDITIONS COMPL PREG/C                    

 

             2018           CANDELARIA COOPER MD, Ot           Z3A.

34           

34 WEEKS GESTATION OF PREGNANCY                    

 

             2018           CANDELARIA COOPER MD, Ot           N85.

8           

OTHER SPECIFIED NONINFLAMMATORY DISORDER                    

 

             2018           CANDELARIA COOPER MD, Ot           O99.

89           

OTH DISEASES AND CONDITIONS COMPL PREG/C                    

 

             2018           CANDELARIA COOPER MD, Ot           Z3A.

38           

38 WEEKS GESTATION OF PREGNANCY                    

 

             2018           CANDELARIA COOPER MD, Ot           N85.

8           

OTHER SPECIFIED NONINFLAMMATORY DISORDER                    

 

             2018           CANDELARIA COOPER MD, Ot           O99.

89           

OTH DISEASES AND CONDITIONS COMPL PREG/C                    

 

             2018           CANDELARIA COOPER MD, Ot           Z3A.

38           

38 WEEKS GESTATION OF PREGNANCY                    

 

             2018           CANDELARIA COOPER MD, Ot           O80 

          

ENCOUNTER FOR FULL-TERM UNCOMPLICATED DE                    

 

             2018           CANDELARIA COOPER MD, Ot           Z37.

0           

SINGLE LIVE BIRTH                                

 

             2018           CANDELARIA COOPER MD, Ot           Z3A.

39           

39 WEEKS GESTATION OF PREGNANCY                    

 

                2018           ARJUN SUAREZ MD           Ot      

        E87.6      

                          HYPOKALEMIA                        

 

                2018           ARJUN SUAREZ MD           Ot      

        F17.200    

                          NICOTINE DEPENDENCE, UNSPECIFIED, UNCOMP              

      

 

                2018           ARJUN SUAREZ MD           Ot      

        G40.909    

                          EPILEPSY, UNSP, NOT INTRACTABLE, WITHOUT              

      

 

                2018           ARJUN SUAREZ MD           Ot      

        I10        

                          ESSENTIAL (PRIMARY) HYPERTENSION                    

 

                2018           ARJUN SUAREZ MD           Ot      

        R07.89     

                          OTHER CHEST PAIN                    

 

                2018           ARJUN SUAREZ MD           Ot      

        R10.13     

                          EPIGASTRIC PAIN                    

 

                2018           ARJUN SUAREZ MD           Ot      

        S99.922A   

                          UNSPECIFIED INJURY OF LEFT FOOT, INITIAL              

      

 

                2018           ARJUN SUAREZ MD           Ot      

        X58.XXXA   

                          EXPOSURE TO OTHER SPECIFIED FACTORS, INI              

      

 

                2018           ARJUN SUAREZ MD           Ot      

        Z32.01     

                          ENCOUNTER FOR PREGNANCY TEST, RESULT POS              

      

 

                2018           ARJUN SUAREZ MD           Ot      

        E87.6      

                          HYPOKALEMIA                        

 

                2018           ARJUN SUAREZ MD           Ot      

        F17.200    

                          NICOTINE DEPENDENCE, UNSPECIFIED, UNCOMP              

      

 

                2018           ARJUN SUAREZ MD           Ot      

        G40.909    

                          EPILEPSY, UNSP, NOT INTRACTABLE, WITHOUT              

      

 

                2018           ARJUN SUAREZ MD           Ot      

        I10        

                          ESSENTIAL (PRIMARY) HYPERTENSION                    

 

                2018           ARJUN SUAREZ MD           Ot      

        R07.89     

                          OTHER CHEST PAIN                    

 

                2018           ARJUN SUAREZ MD           Ot      

        R10.13     

                          EPIGASTRIC PAIN                    

 

                2018           ARJUN SUAREZ MD           Ot      

        S99.922A   

                          UNSPECIFIED INJURY OF LEFT FOOT, INITIAL              

      

 

                2018           ARJUN SUAREZ MD           Ot      

        X58.XXXA   

                          EXPOSURE TO OTHER SPECIFIED FACTORS, INI              

      

 

                2018           ARJUN SUAREZ MD           Ot      

        Z32.01     

                          ENCOUNTER FOR PREGNANCY TEST, RESULT POS              

      

 

                2019           DAYANA BLANKENSHIP           Ot           

   G40.909         

                          EPILEPSY, UNSP, NOT INTRACTABLE, WITHOUT              

      

 

             2019           DAYANA BLANKENSHIP           Ot           M43

.6           

TORTICOLLIS                                      

 

             2019           DAYANA BLANKENSHIP           Ot           O16

.1           

UNSPECIFIED MATERNAL HYPERTENSION, FIRST                    

 

                2019           DAYANA BLANKENSHIP           Ot           

   O26.891         

                          OTH PREGNANCY RELATED CONDITIONS, FIRST               

      

 

                2019           DAYANA BLANKENSHIP           Ot           

   O99.351         

                          DISEASES OF THE NERVOUS SYS COMP PREGNAN              

      

 

                2019           DAYANA BLANKENSHIP           Ot           

   O99.89          

                          OTH DISEASES AND CONDITIONS COMPL PREG/C              

      

 

                2019           DAYANA BLANKENSHIP           Ot           

   Z3A.00          

                          WEEKS OF GESTATION OF PREGNANCY NOT SPEC              

      

 

             2019           CANDELARIA COOPER MD, Ot           Z36 

          

ENCOUNTER FOR  SCREENING OF MOT                    

 

             2019           CANDELARIA COOPER MD, Ot           O44.

13           

PLACENTA PREVIA WITH HEMORRHAGE, THIRD T                    

 

             2019           CANDELARIA COOPER MD, Ot           Z3A.

00           

WEEKS OF GESTATION OF PREGNANCY NOT SPEC                    

 

             2019           CANDELARIA COOPER MD           Ot           O26.

23           

PREG CARE FOR PATIENT W RECURRENT PREG L                    

 

             2019           CANDELARIA COOPER MD, Ot           Z3A.

32           

32 WEEKS GESTATION OF PREGNANCY                    

 

             2019           CANDELARIA COOPER MD           Ot           R56.

9           

UNSPECIFIED CONVULSIONS                          

 

             2019           CANDELARIA COOPER MD           Ot           Z36.

87           

ENCOUNTER FOR  SCREENING FOR UN                    

 

             2019           CANDELARIA COOPER MD, Ot           Z3A.

01           

LESS THAN 8 WEEKS GESTATION OF PREGNANCY                    

 

             2019           CANDELARIA COOPER MD           Ot           Z36.

89           

ENCOUNTER FOR OTHER SPECIFIED                      

 

             2019           CANDELARIA COOPER MD, Ot           Z3A.

18           

18 WEEKS GESTATION OF PREGNANCY                    

 

                01/10/2019           DAYANA BLANKENSHIP           Ot           

   G40.909         

                          EPILEPSY, UNSP, NOT INTRACTABLE, WITHOUT              

      

 

             01/10/2019           DAYANA BLANKENSHIP           Ot           M43

.6           

TORTICOLLIS                                      

 

             01/10/2019           DAYANA BLANKENSHIP           Ot           O16

.1           

UNSPECIFIED MATERNAL HYPERTENSION, FIRST                    

 

                01/10/2019           DAYANA BLANKENSHIP           Ot           

   O26.891         

                          OTH PREGNANCY RELATED CONDITIONS, FIRST               

      

 

                01/10/2019           DAYANA BLANKENSHIP           Ot           

   O99.351         

                          DISEASES OF THE NERVOUS SYS COMP PREGNAN              

      

 

                01/10/2019           DAYANA BLANKENSHIP           Ot           

   O99.89          

                          OTH DISEASES AND CONDITIONS COMPL PREG/C              

      

 

                01/10/2019           DAYANA BLANKENSHIP           Ot           

   Z3A.00          

                          WEEKS OF GESTATION OF PREGNANCY NOT SPEC              

      

 

                2019           DAYANA BLANKENSHIP           Ot           

   G40.909         

                          EPILEPSY, UNSP, NOT INTRACTABLE, WITHOUT              

      

 

             2019           DAYANA BLANKENSHIP           Ot           M43

.6           

TORTICOLLIS                                      

 

             2019           DAYANA BLANKENSHIP           Ot           O16

.1           

UNSPECIFIED MATERNAL HYPERTENSION, FIRST                    

 

                2019           DAYANA BLANKENSHIP           Ot           

   O26.891         

                          OTH PREGNANCY RELATED CONDITIONS, FIRST               

      

 

                2019           DAYANA BLANKENSHIP           Ot           

   O99.351         

                          DISEASES OF THE NERVOUS SYS COMP PREGNAN              

      

 

                2019           DAYANA BLANKENSHIP           Ot           

   O99.89          

                          OTH DISEASES AND CONDITIONS COMPL PREG/C              

      

 

                2019           DAYANA BLANKENSHIP           Ot           

   Z3A.00          

                          WEEKS OF GESTATION OF PREGNANCY NOT SPEC              

      

 

             2019           CANDELARIA COOPER MD, Ot           Z36 

          

ENCOUNTER FOR  SCREENING OF MOT                    

 

             2019           CANDELARIA COOPER MD, Ot           O44.

13           

PLACENTA PREVIA WITH HEMORRHAGE, THIRD T                    

 

             2019           CANDELARIA COOPER MD, Ot           Z3A.

00           

WEEKS OF GESTATION OF PREGNANCY NOT SPEC                    

 

             2019           CANDELARIA COOPER MD, Ot           O26.

23           

PREG CARE FOR PATIENT W RECURRENT PREG L                    

 

             2019           CANDELARIA COOPER MD, Ot           Z3A.

32           

32 WEEKS GESTATION OF PREGNANCY                    

 

             2019           CANDELARIA COOPER MD, Ot           R56.

9           

UNSPECIFIED CONVULSIONS                          

 

             2019           CANDELARIA COOPER MD, Ot           Z36.

87           

ENCOUNTER FOR  SCREENING FOR UN                    

 

             2019           CANDELARIA COOPER MD, Ot           Z3A.

01           

LESS THAN 8 WEEKS GESTATION OF PREGNANCY                    

 

             2019           CANDELARIA COOPER MD, Ot           Z36.

89           

ENCOUNTER FOR OTHER SPECIFIED                      

 

             2019           CANDELARIA COOPER MD, Ot           Z3A.

18           

18 WEEKS GESTATION OF PREGNANCY                    

 

             2019           CANDELARIA COOPER MD, Ot           Z34.

92           

ENCNTR FOR SUPRVSN OF NORMAL PREG, UNSP,                    

 

             2019           CANDELARIA COOPER MD, Ot           Z3A.

14           

14 WEEKS GESTATION OF PREGNANCY                    

 

             2019           CANDELARIA COOPER MD, Ot           Z34.

92           

ENCNTR FOR SUPRVSN OF NORMAL PREG, UNSP,                    

 

             2019           CANDELARIA COOPER MD, Ot           Z3A.

14           

14 WEEKS GESTATION OF PREGNANCY                    

 

             2019           CANDELARIA COOPER MD, Ot           Z36 

          

ENCOUNTER FOR  SCREENING OF MOT                    

 

             2019           CANDELARIA COOPER MD, Ot           O44.

13           

PLACENTA PREVIA WITH HEMORRHAGE, THIRD T                    

 

             2019           CANDELARIA COOPER MD, Ot           Z3A.

00           

WEEKS OF GESTATION OF PREGNANCY NOT SPEC                    

 

             2019           CANDELARIA COOPER MD, Ot           O26.

23           

PREG CARE FOR PATIENT W RECURRENT PREG L                    

 

             2019           CANDELARIA COOPER MD, Ot           Z3A.

32           

32 WEEKS GESTATION OF PREGNANCY                    

 

             2019           CANDELARIA COOPER MD, Ot           R56.

9           

UNSPECIFIED CONVULSIONS                          

 

             2019           CANDELARIA COOPER MD, Ot           Z36.

87           

ENCOUNTER FOR  SCREENING FOR UN                    

 

             2019           CANDELARIA COOPER MD, Ot           Z3A.

01           

LESS THAN 8 WEEKS GESTATION OF PREGNANCY                    

 

             2019           CANDELARIA COOPER MD, Ot           Z36.

89           

ENCOUNTER FOR OTHER SPECIFIED                      

 

             2019           CANDELARIA COOPER MD, Ot           Z3A.

18           

18 WEEKS GESTATION OF PREGNANCY                    

 

             2019           CANDELARIA COOPER MD, Ot           Z34.

92           

ENCNTR FOR SUPRVSN OF NORMAL PREG, UNSP,                    

 

             2019           CANDELARIA COOPER MD, Ot           Z3A.

14           

14 WEEKS GESTATION OF PREGNANCY                    

 

             2019           CANDELARIA COOPER MD, Ot           Z36.

89           

ENCOUNTER FOR OTHER SPECIFIED                      

 

             2019           CANDELARIA COOPER MD, Ot           Z3A.

18           

18 WEEKS GESTATION OF PREGNANCY                    

 

             2019           CADNELARIA COOPER MD, Ot           Z36.

89           

ENCOUNTER FOR OTHER SPECIFIED                      

 

             2019           CANDELARIA COOPER MD, Ot           Z3A.

18           

18 WEEKS GESTATION OF PREGNANCY                    

 

             2019           WENDY CAZARES MD           Ot           A59

.9           

TRICHOMONIASIS, UNSPECIFIED                      

 

             2019           WENDY CAZARES MD           Ot           R10

.2           

PELVIC AND PERINEAL PAIN                         

 

                2019           WENDY CAZARES MD           Ot           

   Z3A.35          

                          35 WEEKS GESTATION OF PREGNANCY                    

 

             2019           WENDY CAZARES MD           Ot           A59

.9           

TRICHOMONIASIS, UNSPECIFIED                      

 

             2019           WENDY CAZARES MD           Ot           R10

.2           

PELVIC AND PERINEAL PAIN                         

 

                2019           WENDY CAZARES MD           Ot           

   Z3A.35          

                          35 WEEKS GESTATION OF PREGNANCY                    

 

             2019           CANDELARIA COOPER MD, Ot           Z36 

          

ENCOUNTER FOR  SCREENING OF MOT                    

 

             2019           CANDELARIA COOPER MD, Ot           O44.

13           

PLACENTA PREVIA WITH HEMORRHAGE, THIRD T                    

 

             2019           CANDELARIA COOPER MD, Ot           Z3A.

00           

WEEKS OF GESTATION OF PREGNANCY NOT SPEC                    

 

             2019           CANDELARIA COOPER MD, Ot           O26.

23           

PREG CARE FOR PATIENT W RECURRENT PREG L                    

 

             2019           CANDELARIA COOPER MD, Ot           Z3A.

32           

32 WEEKS GESTATION OF PREGNANCY                    

 

             2019           CANDELARIA COOPER MD, Ot           R56.

9           

UNSPECIFIED CONVULSIONS                          

 

             2019           CANDELARIA COOPER MD, Ot           Z36.

87           

ENCOUNTER FOR  SCREENING FOR UN                    

 

             2019           CANDELARIA COOPER MD, Ot           Z3A.

01           

LESS THAN 8 WEEKS GESTATION OF PREGNANCY                    

 

             2019           CANDELARIA COOPER MD, Ot           Z36.

89           

ENCOUNTER FOR OTHER SPECIFIED                      

 

             2019           CANDELARIA COOPER MD, Ot           Z3A.

18           

18 WEEKS GESTATION OF PREGNANCY                    

 

             2019           CANDELARIA COOPER MD, Ot           Z34.

92           

ENCNTR FOR SUPRVSN OF NORMAL PREG, UNSP,                    

 

             2019           CANDELARIA COOPER MD, Ot           Z3A.

14           

14 WEEKS GESTATION OF PREGNANCY                    

 

             2019           CANDELARIA COOPER MD, Ot           Z36.

89           

ENCOUNTER FOR OTHER SPECIFIED                      

 

             2019           CANDELARIA COOPER MD, Ot           Z3A.

18           

18 WEEKS GESTATION OF PREGNANCY                    

 

             2019           CANDELARIA COOPER MD, Ot           O47.

1           

FALSE LABOR AT OR AFTER 37 COMPLETED WEE                    

 

             2019           CANDELARIA COOPER MD, Ot           Z3A.

37           

37 WEEKS GESTATION OF PREGNANCY                    

 

             2019           CANDELARIA COOPER MD, Ot           O47.

1           

FALSE LABOR AT OR AFTER 37 COMPLETED WEE                    

 

             2019           CANDELARIA COOPER MD, Ot           Z3A.

37           

37 WEEKS GESTATION OF PREGNANCY                    

 

             2019           CANDELARIA COOPER MD, Ot           Z36 

          

ENCOUNTER FOR  SCREENING OF MOT                    

 

             2019           CANDELARIA COOPER MD, Ot           O44.

13           

PLACENTA PREVIA WITH HEMORRHAGE, THIRD T                    

 

             2019           CANDELARIA COOPER MD, Ot           Z3A.

00           

WEEKS OF GESTATION OF PREGNANCY NOT SPEC                    

 

             2019           CANDELARIA COOPER MD, Ot           O26.

23           

PREG CARE FOR PATIENT W RECURRENT PREG L                    

 

             2019           CANDELARIA COOPER MD, Ot           Z3A.

32           

32 WEEKS GESTATION OF PREGNANCY                    

 

             2019           CANDELARIA COOPER MD, Ot           R56.

9           

UNSPECIFIED CONVULSIONS                          

 

             2019           CANDELARIA COOPER MD, Ot           Z36.

87           

ENCOUNTER FOR  SCREENING FOR UN                    

 

             2019           CANDELARIA COOPER MD, Ot           Z3A.

01           

LESS THAN 8 WEEKS GESTATION OF PREGNANCY                    

 

             2019           CANDELARIA COOPER MD, Ot           Z36.

89           

ENCOUNTER FOR OTHER SPECIFIED                      

 

             2019           CANDELARIA COOPER MD, Ot           Z3A.

18           

18 WEEKS GESTATION OF PREGNANCY                    

 

             2019           CANDELARIA COOPER MD, Ot           Z34.

92           

ENCNTR FOR SUPRVSN OF NORMAL PREG, UNSP,                    

 

             2019           CANDELARIA COOPER MD, Ot           Z3A.

14           

14 WEEKS GESTATION OF PREGNANCY                    

 

             2019           CANDELARIA COOPER MD, Ot           Z36.

89           

ENCOUNTER FOR OTHER SPECIFIED                      

 

             2019           CANDELARIA COOPER MD, Ot           Z3A.

18           

18 WEEKS GESTATION OF PREGNANCY                    

 

                2019           DEDE PATRICK MD, Ot           

   G40.909         

                          EPILEPSY, UNSP, NOT INTRACTABLE, WITHOUT              

      

 

                2019           DEDE PATRICK MD, Ot           

   O32.8XX0        

                          MATERNAL CARE FOR OTH MALPRESENTATION OF              

      

 

                2019           DEDE PATRICK MD           Ot           

   O99.353         

                          DISEASES OF THE NERVOUS SYS COMP PREGNAN              

      

 

                2019           DEDE PATRICK MD, Ot           

   O99.820         

                          STREPTOCOCCUS B CARRIER STATE COMPLICATI              

      

 

             2019           DARNELL MD, DEDE N           Ot           Z37

.0           

SINGLE LIVE BIRTH                                

 

                2019           DARNELL DOMINIQUE, DEDE CRUZ Ot           

   Z3A.38          

                          38 WEEKS GESTATION OF PREGNANCY                    

 

             2019           CANDELARIA COOPER MD, Ot           Z36 

          

ENCOUNTER FOR  SCREENING OF MOT                    

 

             2019           CANDELARIA COOPER MD, Ot           O44.

13           

PLACENTA PREVIA WITH HEMORRHAGE, THIRD T                    

 

             2019           CANDELARIA COOPER MD, Ot           Z3A.

00           

WEEKS OF GESTATION OF PREGNANCY NOT SPEC                    

 

             2019           CANDELARIA COOPER MD, Ot           O26.

23           

PREG CARE FOR PATIENT W RECURRENT PREG L                    

 

             2019           CANDELARIA COOPER MD, Ot           Z3A.

32           

32 WEEKS GESTATION OF PREGNANCY                    

 

             2019           CANDELARIA COOPER MD, Ot           R56.

9           

UNSPECIFIED CONVULSIONS                          

 

             2019           CANDELARIA COOPER MD, Ot           Z36.

87           

ENCOUNTER FOR  SCREENING FOR UN                    

 

             2019           CANDELARIA COOPER MD, Ot           Z3A.

01           

LESS THAN 8 WEEKS GESTATION OF PREGNANCY                    

 

             2019           CANDELARIA COOPER MD, Ot           Z36.

89           

ENCOUNTER FOR OTHER SPECIFIED                      

 

             2019           CANDELARIA COOPER MD, Ot           Z3A.

18           

18 WEEKS GESTATION OF PREGNANCY                    

 

             2019           CANDELARIA COOPER MD, Ot           Z34.

92           

ENCNTR FOR SUPRVSN OF NORMAL PREG, UNSP,                    

 

             2019           CANDELARIA COOPER MD, Ot           Z3A.

14           

14 WEEKS GESTATION OF PREGNANCY                    

 

             2019           CANDELARIA COOPER MD, Ot           Z36.

89           

ENCOUNTER FOR OTHER SPECIFIED                      

 

             2019           CANDELARIA COOPER MD, Ot           Z3A.

18           

18 WEEKS GESTATION OF PREGNANCY                    

 

                2019           DAYANA BLANKENSHIP           Ot           

   G40.909         

                          EPILEPSY, UNSP, NOT INTRACTABLE, WITHOUT              

      

 

             2019           DAYANA BLANKENSHIP           Ot           I10

           

ESSENTIAL (PRIMARY) HYPERTENSION                    

 

             2019           DAYANA BLANKENSHIP           Ot           R51

           

HEADACHE                                         

 

                2019           DAYANA BLANKENSHIP           Ot           

   G40.909         

                          EPILEPSY, UNSP, NOT INTRACTABLE, WITHOUT              

      

 

             2019           DAYANA BLANKENSHIP           Ot           I10

           

ESSENTIAL (PRIMARY) HYPERTENSION                    

 

             2019           DAYANA BLANKENSHIP           Ot           R51

           

HEADACHE                                         

 

                2019           KADY OTERO           Ot         

     G40.909       

                          EPILEPSY, UNSP, NOT INTRACTABLE, WITHOUT              

      

 

             2019           KADY OTERO           Ot           I

10           

ESSENTIAL (PRIMARY) HYPERTENSION                    

 

                2019           KADY OTERO           Ot         

     T63.441A      

                          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              

      

 

                2019           KADY OTERO           Ot         

     Z87.891       

                          PERSONAL HISTORY OF NICOTINE DEPENDENCE               

     

 

                10/01/2019           KADY OTERO           Ot         

     G40.909       

                          EPILEPSY, UNSP, NOT INTRACTABLE, WITHOUT              

      

 

             10/01/2019           KADY OTERO           Ot           I

10           

ESSENTIAL (PRIMARY) HYPERTENSION                    

 

                10/01/2019           KADY OTERO           Ot         

     T63.441A      

                          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              

      

 

                10/01/2019           KADY OTERO           Ot         

     Z87.891       

                          PERSONAL HISTORY OF NICOTINE DEPENDENCE               

     

 

                10/05/2019           KADY OTERO           Ot         

     G40.909       

                          EPILEPSY, UNSP, NOT INTRACTABLE, WITHOUT              

      

 

             10/05/2019           KADY OTERO           Ot           I

10           

ESSENTIAL (PRIMARY) HYPERTENSION                    

 

                10/05/2019           KADY OTERO           Ot         

     T63.441A      

                          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              

      

 

                10/05/2019           KADY OTERO           Ot         

     Z87.891       

                          PERSONAL HISTORY OF NICOTINE DEPENDENCE               

     

 

             10/08/2019           JANELLE FLANAGAN DO           Ot           F17.210

           

NICOTINE DEPENDENCE, CIGARETTES, UNCOMPL                    

 

             10/08/2019           JANELLE FLANAGAN DO           Ot           G40.909

           

EPILEPSY, UNSP, NOT INTRACTABLE, WITHOUT                    

 

             10/08/2019           JANELLE FLANAGAN DO           Ot           I10    

       

ESSENTIAL (PRIMARY) HYPERTENSION                    

 

             10/08/2019           NISHI FLANAGAN DOA K           Ot           R51    

       

HEADACHE                                         

 

             10/09/2019           JANELLE FLANAGAN DO           Ot           R56.9  

         

UNSPECIFIED CONVULSIONS                          

 

             10/09/2019           JANELLE FLANAGAN DO           Ot           S00.501

A           

UNSPECIFIED SUPERFICIAL INJURY OF LIP, I                    

 

             10/09/2019           JANELLE FLANAGAN DO           Ot           X58.XXX

A           

EXPOSURE TO OTHER SPECIFIED FACTORS, INI                    

 

             10/14/2019           JANELLE FLANAGAN DO           Ot           F17.210

           

NICOTINE DEPENDENCE, CIGARETTES, UNCOMPL                    

 

             10/14/2019           JANELLE FLANAGAN DO           Ot           G40.909

           

EPILEPSY, UNSP, NOT INTRACTABLE, WITHOUT                    

 

             10/14/2019           MASHA DO, JANELLE K           Ot           I10    

       

ESSENTIAL (PRIMARY) HYPERTENSION                    

 

             10/14/2019           MASHA DO, JANELLE K           Ot           R51    

       

HEADACHE                                         

 

             10/15/2019           MASHA DO, JANELLE K           Ot           R56.9  

         

UNSPECIFIED CONVULSIONS                          

 

             10/15/2019           MASHA DO, JANELLE K           Ot           S00.501

A           

UNSPECIFIED SUPERFICIAL INJURY OF LIP, I                    

 

             10/15/2019           MASHA DO, JANELLE K           Ot           X58.XXX

A           

EXPOSURE TO OTHER SPECIFIED FACTORS, INI                    

 

             10/17/2019           MASHA DO, JANELLE K           Ot           R56.9  

         

UNSPECIFIED CONVULSIONS                          

 

             10/17/2019           MASHA DO, JANELLE K           Ot           S00.501

A           

UNSPECIFIED SUPERFICIAL INJURY OF LIP, I                    

 

             10/17/2019           MASHA DO, JANELLE K           Ot           X58.XXX

A           

EXPOSURE TO OTHER SPECIFIED FACTORS, INI                    

 

             2019           CANDELARIA COOPER MD, Ot           Z36 

          

ENCOUNTER FOR  SCREENING OF MOT                    

 

             2019           CANDELARIA COPOER MD, Ot           O44.

13           

PLACENTA PREVIA WITH HEMORRHAGE, THIRD T                    

 

             2019           CANDELARIA COOPER MD, Ot           Z3A.

00           

WEEKS OF GESTATION OF PREGNANCY NOT SPEC                    

 

             2019           CANDELARIA COOPER MD, Ot           O26.

23           

PREG CARE FOR PATIENT W RECURRENT PREG L                    

 

             2019           CANDELARIA COOPER MD, Ot           Z3A.

32           

32 WEEKS GESTATION OF PREGNANCY                    

 

             2019           CANDELARIA COOPER MD, Ot           R56.

9           

UNSPECIFIED CONVULSIONS                          

 

             2019           CANDELARIA COOPER MD, Ot           Z36.

87           

ENCOUNTER FOR  SCREENING FOR UN                    

 

             2019           CANDELARIA COOPER MD, Ot           Z3A.

01           

LESS THAN 8 WEEKS GESTATION OF PREGNANCY                    

 

             2019           CANDELARIA COOPER MD, Ot           Z36.

89           

ENCOUNTER FOR OTHER SPECIFIED                      

 

             2019           CANDELARIA COOPER MD, Ot           Z3A.

18           

18 WEEKS GESTATION OF PREGNANCY                    

 

             2019           CANDELARIA COOPER MD, Ot           Z34.

92           

ENCNTR FOR SUPRVSN OF NORMAL PREG, UNSP,                    

 

             2019           CANDELARIA COOPER MD, Ot           Z3A.

14           

14 WEEKS GESTATION OF PREGNANCY                    

 

             2019           CANDELARIA COOPER MD, Ot           Z36.

89           

ENCOUNTER FOR OTHER SPECIFIED                      

 

             2019           KENNETH DOMINIQUE, CANDELARIA BRAVO Ot           Z3A.

18           

18 WEEKS GESTATION OF PREGNANCY                    

 

                2019           ARJUN SUAREZ MD, Ot      

        G40.909    

                          EPILEPSY, UNSP, NOT INTRACTABLE, WITHOUT              

      

 

                2019           ARJUN SUAREZ MD           Ot      

        I10        

                          ESSENTIAL (PRIMARY) HYPERTENSION                    

 

                2019           ARJUN SUAREZ MD           Ot      

        K05.6      

                          PERIODONTAL DISEASE, UNSPECIFIED                    

 

                2019           ARJUN SUAREZ MD           Ot      

        R56.9      

                          UNSPECIFIED CONVULSIONS                    

 

                2019           ARJUN SUAREZ MD           Ot      

        S00.83XA   

                          CONTUSION OF OTHER PART OF HEAD, INITIAL              

      

 

                2019           ARJUN SUAREZ MD           Ot      

        W18.39XA   

                          OTHER FALL ON SAME LEVEL, INITIAL ENCOUN              

      

 

                2019           ARJUN SUAREZ MD           Ot      

        Y92.22     

                          [facility]                    

 

                2019           ARJUN SUAREZ MD, Ot      

        Z79.52     

                          LONG TERM (CURRENT) USE OF SYSTEMIC STER              

      

 

                2019           ARJUN SUAREZ MD, Ot      

        G40.909    

                          EPILEPSY, UNSP, NOT INTRACTABLE, WITHOUT              

      

 

                2019           ARJUN SUAREZ MD           Ot      

        I10        

                          ESSENTIAL (PRIMARY) HYPERTENSION                    

 

                2019           ARJUN SUAREZ MD           Ot      

        K05.6      

                          PERIODONTAL DISEASE, UNSPECIFIED                    

 

                2019           ARJUN SUAREZ MD           Ot      

        R56.9      

                          UNSPECIFIED CONVULSIONS                    

 

                2019           ARJUN SUAREZ MD           Ot      

        S00.83XA   

                          CONTUSION OF OTHER PART OF HEAD, INITIAL              

      

 

                2019           ARJUN SUAREZ MD           Ot      

        W18.39XA   

                          OTHER FALL ON SAME LEVEL, INITIAL ENCOUN              

      

 

                2019           ARJUN SUAREZ MD           Ot      

        Y92.22     

                          [facility]                    

 

                2019           ARJUN SUAREZ MD           Ot      

        Z79.52     

                          LONG TERM (CURRENT) USE OF SYSTEMIC STER              

      



                                                                                
                                                                                
                                                                                
                                                                                
                                                                                
                                                                                
                                                                                
         



Procedures

      



                Code            Description           Performed By           Per

formed On        

 

                                                                       EX

TRACTION OF PRODUCTS OF 

CONCEPTION, RE                                               2016        

 

                                      [id number]                                 DE

LIVERY OF PRODUCTS OF 

CONCEPTION, EXTE                                               2016       

 

 

                                      [id number] BUTT OF PRODUCTS OF 

CONCEPTION, EXTE                                               2018       

 

 

                                      [id number]                                 IN

TRODUCTION OF OTH HORMONE 

INTO PERIPH                                                2018        

 

                                      [id number]                                 EX

TRACTION OF PRODUCTS OF 

CONCEPTION, LO                                               2019        



                          



Results

      



                    Test                Result              Range        

 

                                        Complete urinalysis with reflex to cultu

re - 17 17:35         

 

                    Urine color determination           YELLOW              NRG 

       

 

                    Urine clarity determination           VERY CLOUDY           

 NRG        

 

                    Urine pH measurement by test strip           8              

     5-9        

 

                    Specific gravity of urine by test strip           1.015     

          1.016-1.022  

     

 

                          Urine protein assay by test strip, semi-quantitative  

         NEGATIVE         

                                        NEGATIVE        

 

                    Urine glucose detection by automated test strip           NE

GATIVE            

NEGATIVE        

 

                          Erythrocytes detection in urine sediment by light micr

oscopy           NEGATIVE 

                                        NEGATIVE        

 

                    Urine ketones detection by automated test strip           NE

GATIVE            

NEGATIVE        

 

                    Urine nitrite detection by test strip           NEGATIVE    

        NEGATIVE    

   

 

                    Urine total bilirubin detection by test strip           NEGA

TIVE            

NEGATIVE        

 

                          Urine urobilinogen measurement by automated test strip

 (mass/volume)           

NORMAL                                  NORMAL        

 

                    Urine leukocyte esterase detection by dipstick           NEG

ATIVE            

NEGATIVE        

 

                                        Automated urine sediment erythrocyte cou

nt by microscopy (number/high power 

field)                    NONE                      NRG        

 

                                        Automated urine sediment leukocyte count

 by microscopy (number/high power field)

                          NONE                      NRG        

 

                          Bacteria detection in urine sediment by light microsco

py           FEW          

                                        NRG        

 

                                        Squamous epithelial cells detection in u

rine sediment by light microscopy       

                          5-10                      NRG        

 

                          Crystals detection in urine sediment by light microsco

py           PRESENT      

                                        NRG        

 

                    Casts detection in urine sediment by light microscopy       

    NONE                

NRG        

 

                          Mucus detection in urine sediment by light microscopy 

          NEGATIVE        

                                        NRG        

 

                    Complete urinalysis with reflex to culture           NO     

             NRG        

 

                          Amorphous sediment detection in urine sediment by ligh

t microscopy           MOD

EDDIE PHOSPHATE                          NRG        

 

                                        Urine drug screening test - 17 17:

35         

 

                    Urine phencyclidine detection by screening method           

NEGATIVE            

NEGATIVE        

 

                          Urine benzodiazepines detection by screening method   

        NEGATIVE          

                                        NEGATIVE        

 

                    Urine cocaine detection           NEGATIVE            NEGATI

VE        

 

                    Urine amphetamines detection by screening method           N

EGATIVE            

NEGATIVE        

 

                          Urine methamphetamine detection by screening method   

        NEGATIVE          

                                        NEGATIVE        

 

                    Urine cannabinoids detection by screening method           N

EGATIVE            

NEGATIVE        

 

                    Urine opiates detection by screening method           NEGATI

VE            

NEGATIVE        

 

                    Urine barbiturates detection           NEGATIVE            N

EGATIVE        

 

                          Screening urine tricyclic antidepressants detection   

        NEGATIVE          

                                        NEGATIVE        

 

                    Urine methadone detection by screening method           NEGA

TIVE            

NEGATIVE        

 

                    Urine oxycodone detection           NEGATIVE            NEGA

TIVE        

 

                    Urine propoxyphene detection           NEGATIVE            N

EGATIVE        

 

                                        Complete blood count (CBC) with automate

d white blood cell (WBC) differential - 

17 18:50         

 

                          Blood leukocytes automated count (number/volume)      

     10.0 10*3/uL         

                                        4.3-11.0        

 

                          Blood erythrocytes automated count (number/volume)    

       4.56 10*6/uL       

                                        4.35-5.85        

 

                    Venous blood hemoglobin measurement (mass/volume)           

12.9 g/dL           

11.5-16.0        

 

                    Blood hematocrit (volume fraction)           38 %           

     35-52        

 

                    Automated erythrocyte mean corpuscular volume           83 [

foz_us]           

80-99        

 

                                        Automated erythrocyte mean corpuscular h

emoglobin (mass per erythrocyte)        

                          28 pg                     25-34        

 

                                        Automated erythrocyte mean corpuscular h

emoglobin concentration measurement 

(mass/volume)             34 g/dL                   32-36        

 

                    Automated erythrocyte distribution width ratio           13.

2 %              10.0-

14.5        

 

                    Automated blood platelet count (count/volume)           264 

10*3/uL           

130-400        

 

                          Automated blood platelet mean volume measurement      

     11.0 [foz_us]        

                                        7.4-10.4        

 

                    Automated blood neutrophils/100 leukocytes           78 %   

             42-75       

 

 

                    Automated blood lymphocytes/100 leukocytes           14 %   

             12-44       

 

 

                    Blood monocytes/100 leukocytes           8 %                

 0-12        

 

                    Automated blood eosinophils/100 leukocytes           1 %    

             0-10        

 

                    Automated blood basophils/100 leukocytes           0 %      

           0-10        

 

                    Blood neutrophils automated count (number/volume)           

7.8 10*3            

1.8-7.8        

 

                    Blood lymphocytes automated count (number/volume)           

1.4 10*3            

1.0-4.0        

 

                    Blood monocytes automated count (number/volume)           0.

8 10*3            

0.0-1.0        

 

                    Automated eosinophil count           0.1 10*3/uL           0

.0-0.3        

 

                    Automated blood basophil count (count/volume)           0.0 

10*3/uL           

0.0-0.1        

 

                                        Serum or plasma choriogonadotropin (preg

joyce test) detection - 17 18:50  

       

 

                          Serum or plasma choriogonadotropin (pregnancy test) de

tection           NEGATIVE

                                        NEGATIVE        

 

                                        Comprehensive metabolic panel - 17

 18:50         

 

                          Serum or plasma sodium measurement (moles/volume)     

      138 mmol/L          

                                        135-145        

 

                          Serum or plasma potassium measurement (moles/volume)  

         3.5 mmol/L       

                                        3.6-5.0        

 

                          Serum or plasma chloride measurement (moles/volume)   

        108 mmol/L        

                                                

 

                    Carbon dioxide           21 mmol/L           21-32        

 

                          Serum or plasma anion gap determination (moles/volume)

           9 mmol/L       

                                        5-14        

 

                          Serum or plasma urea nitrogen measurement (mass/volume

)           12 mg/dL      

                                        7-18        

 

                          Serum or plasma creatinine measurement (mass/volume)  

         0.74 mg/dL       

                                        0.60-1.30        

 

                    Serum or plasma urea nitrogen/creatinine mass ratio         

  16                  NRG 

       

 

                                        Serum or plasma creatinine measurement w

ith calculation of estimated glomerular 

filtration rate           >                         NRG        

 

                    Serum or plasma glucose measurement (mass/volume)           

95 mg/dL            

        

 

                    Serum or plasma calcium measurement (mass/volume)           

8.8 mg/dL           

8.5-10.1        

 

                          Serum or plasma total bilirubin measurement (mass/volu

me)           0.4 mg/dL   

                                        0.1-1.0        

 

                                        Serum or plasma alkaline phosphatase vanessa

surement (enzymatic activity/volume)    

                          37 U/L                            

 

                                        Serum or plasma aspartate aminotransfera

se measurement (enzymatic 

activity/volume)           14 U/L                    5-34        

 

                                        Serum or plasma alanine aminotransferase

 measurement (enzymatic activity/volume)

                          13 U/L                    0-55        

 

                    Serum or plasma protein measurement (mass/volume)           

7.0 g/dL            

6.4-8.2        

 

                    Serum or plasma albumin measurement (mass/volume)           

4.2 g/dL            

3.2-4.5        

 

                                        Magnesium - 17 18:50         

 

                    Magnesium           2.2 mg/dL           1.8-2.4        

 

                                        Serum or plasma thyrotropin measurement 

by detection limit <=0.05 miu/l 

(units/volume) - 17 18:50         

 

                                        Serum or plasma thyrotropin measurement 

by detection limit <=0.05 miu/l 

(units/volume)            1.44 u[iU]/mL             0.35-4.94        

 

                                        Serum or plasma ethanol measurement (mas

s/volume) - 17 18:50         

 

                    Serum or plasma ethanol measurement (mass/volume)           

< mg/dL             

<10        

 

                                        Complete blood count (CBC) with automate

d white blood cell (WBC) differential - 

17 20:20         

 

                          Blood leukocytes automated count (number/volume)      

     11.7 10*3/uL         

                                        4.3-11.0        

 

                          Blood erythrocytes automated count (number/volume)    

       4.01 10*6/uL       

                                        4.35-5.85        

 

                    Venous blood hemoglobin measurement (mass/volume)           

12.3 g/dL           

11.5-16.0        

 

                    Blood hematocrit (volume fraction)           35 %           

     35-52        

 

                    Automated erythrocyte mean corpuscular volume           87 [

foz_us]           

80-99        

 

                                        Automated erythrocyte mean corpuscular h

emoglobin (mass per erythrocyte)        

                          31 pg                     25-34        

 

                                        Automated erythrocyte mean corpuscular h

emoglobin concentration measurement 

(mass/volume)             35 g/dL                   32-36        

 

                    Automated erythrocyte distribution width ratio           13.

3 %              10.0-

14.5        

 

                    Automated blood platelet count (count/volume)           245 

10*3/uL           

130-400        

 

                          Automated blood platelet mean volume measurement      

     10.9 [foz_us]        

                                        7.4-10.4        

 

                    Automated blood neutrophils/100 leukocytes           68 %   

             42-75       

 

 

                    Automated blood lymphocytes/100 leukocytes           23 %   

             12-44       

 

 

                    Blood monocytes/100 leukocytes           8 %                

 0-12        

 

                    Automated blood eosinophils/100 leukocytes           1 %    

             0-10        

 

                    Automated blood basophils/100 leukocytes           0 %      

           0-10        

 

                    Blood neutrophils automated count (number/volume)           

8.0 10*3            

1.8-7.8        

 

                    Blood lymphocytes automated count (number/volume)           

2.6 10*3            

1.0-4.0        

 

                    Blood monocytes automated count (number/volume)           0.

9 10*3            

0.0-1.0        

 

                    Automated eosinophil count           0.1 10*3/uL           0

.0-0.3        

 

                    Automated blood basophil count (count/volume)           0.0 

10*3/uL           

0.0-0.1        

 

                                        Comprehensive metabolic panel - 17

 20:20         

 

                          Serum or plasma sodium measurement (moles/volume)     

      138 mmol/L          

                                        135-145        

 

                          Serum or plasma potassium measurement (moles/volume)  

         3.6 mmol/L       

                                        3.6-5.0        

 

                          Serum or plasma chloride measurement (moles/volume)   

        107 mmol/L        

                                                

 

                    Carbon dioxide           22 mmol/L           21-32        

 

                          Serum or plasma anion gap determination (moles/volume)

           9 mmol/L       

                                        5-14        

 

                          Serum or plasma urea nitrogen measurement (mass/volume

)           6 mg/dL       

                                        7-18        

 

                          Serum or plasma creatinine measurement (mass/volume)  

         0.55 mg/dL       

                                        0.60-1.30        

 

                    Serum or plasma urea nitrogen/creatinine mass ratio         

  11                  NRG 

       

 

                                        Serum or plasma creatinine measurement w

ith calculation of estimated glomerular 

filtration rate           >                         NRG        

 

                    Serum or plasma glucose measurement (mass/volume)           

86 mg/dL            

        

 

                    Serum or plasma calcium measurement (mass/volume)           

8.5 mg/dL           

8.5-10.1        

 

                          Serum or plasma total bilirubin measurement (mass/volu

me)           0.3 mg/dL   

                                        0.1-1.0        

 

                                        Serum or plasma alkaline phosphatase vanessa

surement (enzymatic activity/volume)    

                          37 U/L                            

 

                                        Serum or plasma aspartate aminotransfera

se measurement (enzymatic 

activity/volume)           11 U/L                    5-34        

 

                                        Serum or plasma alanine aminotransferase

 measurement (enzymatic activity/volume)

                          7 U/L                     0-55        

 

                    Serum or plasma protein measurement (mass/volume)           

6.7 g/dL            

6.4-8.2        

 

                    Serum or plasma albumin measurement (mass/volume)           

3.6 g/dL            

3.2-4.5        

 

                                        Magnesium - 17 20:20         

 

                    Magnesium           1.7 mg/dL           1.8-2.4        

 

                                        Serum or plasma ethanol measurement (mas

s/volume) - 17 20:20         

 

                    Serum or plasma ethanol measurement (mass/volume)           

< mg/dL             

<10        

 

                                        Complete urinalysis with reflex to cultu

re - 17 20:25         

 

                    Urine color determination           YELLOW              NRG 

       

 

                    Urine clarity determination           CLEAR               NR

G        

 

                    Urine pH measurement by test strip           8              

     5-9        

 

                    Specific gravity of urine by test strip           1.010     

          1.016-1.022  

      

 

                          Urine protein assay by test strip, semi-quantitative  

         NEGATIVE         

                                        NEGATIVE        

 

                    Urine glucose detection by automated test strip           NE

GATIVE            

NEGATIVE        

 

                          Erythrocytes detection in urine sediment by light micr

oscopy           NEGATIVE 

                                        NEGATIVE        

 

                    Urine ketones detection by automated test strip           NE

GATIVE            

NEGATIVE        

 

                    Urine nitrite detection by test strip           NEGATIVE    

        NEGATIVE    

    

 

                    Urine total bilirubin detection by test strip           NEGA

TIVE            

NEGATIVE        

 

                          Urine urobilinogen measurement by automated test strip

 (mass/volume)           

NORMAL                                  NORMAL        

 

                    Urine leukocyte esterase detection by dipstick           1+ 

                 NEGATIVE 

       

 

                                        Automated urine sediment erythrocyte cou

nt by microscopy (number/high power 

field)                    NONE                      NRG        

 

                                        Automated urine sediment leukocyte count

 by microscopy (number/high power field)

                           [HPF]                    NRG        

 

                          Bacteria detection in urine sediment by light microsco

py           FEW          

                                        NRG        

 

                                        Squamous epithelial cells detection in u

rine sediment by light microscopy       

                          2-5                       NRG        

 

                          Crystals detection in urine sediment by light microsco

py           NONE         

                                        NRG        

 

                    Casts detection in urine sediment by light microscopy       

    NONE                

NRG        

 

                          Mucus detection in urine sediment by light microscopy 

          NEGATIVE        

                                        NRG        

 

                    Complete urinalysis with reflex to culture           NO     

             NRG        

 

                                        Urine drug screening test - 17 20:

25         

 

                    Urine phencyclidine detection by screening method           

NEGATIVE            

NEGATIVE        

 

                          Urine benzodiazepines detection by screening method   

        NEGATIVE          

                                        NEGATIVE        

 

                    Urine cocaine detection           NEGATIVE            NEGATI

VE        

 

                    Urine amphetamines detection by screening method           N

EGATIVE            

NEGATIVE        

 

                          Urine methamphetamine detection by screening method   

        NEGATIVE          

                                        NEGATIVE        

 

                    Urine cannabinoids detection by screening method           N

EGATIVE            

NEGATIVE        

 

                    Urine opiates detection by screening method           NEGATI

VE            

NEGATIVE        

 

                    Urine barbiturates detection           NEGATIVE            N

EGATIVE        

 

                          Screening urine tricyclic antidepressants detection   

        NEGATIVE          

                                        NEGATIVE        

 

                    Urine methadone detection by screening method           NEGA

TIVE            

NEGATIVE        

 

                    Urine oxycodone detection           NEGATIVE            NEGA

TIVE        

 

                    Urine propoxyphene detection           NEGATIVE            N

EGATIVE        

 

                                        Complete blood count (CBC) with automate

d white blood cell (WBC) differential - 

18 06:20         

 

                          Blood leukocytes automated count (number/volume)      

     10.6 10*3/uL         

                                        4.3-11.0        

 

                          Blood erythrocytes automated count (number/volume)    

       4.13 10*6/uL       

                                        4.35-5.85        

 

                    Venous blood hemoglobin measurement (mass/volume)           

10.0 g/dL           

11.5-16.0        

 

                    Blood hematocrit (volume fraction)           31 %           

     35-52        

 

                    Automated erythrocyte mean corpuscular volume           75 [

foz_us]           

80-99        

 

                                        Automated erythrocyte mean corpuscular h

emoglobin (mass per erythrocyte)        

                          24 pg                     25-34        

 

                                        Automated erythrocyte mean corpuscular h

emoglobin concentration measurement 

(mass/volume)             32 g/dL                   32-36        

 

                    Automated erythrocyte distribution width ratio           15.

1 %              10.0-

14.5        

 

                    Automated blood platelet count (count/volume)           258 

10*3/uL           

130-400        

 

                          Automated blood platelet mean volume measurement      

     11.3 [foz_us]        

                                        7.4-10.4        

 

                    Automated blood neutrophils/100 leukocytes           74 %   

             42-75       

 

 

                    Automated blood lymphocytes/100 leukocytes           18 %   

             12-44       

 

 

                    Blood monocytes/100 leukocytes           8 %                

 0-12        

 

                    Automated blood eosinophils/100 leukocytes           1 %    

             0-10        

 

                    Automated blood basophils/100 leukocytes           0 %      

           0-10        

 

                    Blood neutrophils automated count (number/volume)           

7.8 10*3            

1.8-7.8        

 

                    Blood lymphocytes automated count (number/volume)           

1.9 10*3            

1.0-4.0        

 

                    Blood monocytes automated count (number/volume)           0.

8 10*3            

0.0-1.0        

 

                    Automated eosinophil count           0.1 10*3/uL           0

.0-0.3        

 

                    Automated blood basophil count (count/volume)           0.0 

10*3/uL           

0.0-0.1        

 

                                        Blood type T Indirect antibody screen pa

marina - 18 06:20         

 

                    ABO+Rh group           OP                  NRG        

 

                    Transfusion band number           [id number]             NRG   

     

 

                    Blood group antibody screen           NEGATIVE            NR

G        

 

                                        Urine drug screening test - 18 08:

45         

 

                    Urine phencyclidine detection by screening method           

NEGATIVE            

NEGATIVE        

 

                          Urine benzodiazepines detection by screening method   

        NEGATIVE          

                                        NEGATIVE        

 

                    Urine cocaine detection           NEGATIVE            NEGATI

VE        

 

                    Urine amphetamines detection by screening method           N

EGATIVE            

NEGATIVE        

 

                          Urine methamphetamine detection by screening method   

        NEGATIVE          

                                        NEGATIVE        

 

                    Urine cannabinoids detection by screening method           N

EGATIVE            

NEGATIVE        

 

                    Urine opiates detection by screening method           NEGATI

VE            

NEGATIVE        

 

                    Urine barbiturates detection           NEGATIVE            N

EGATIVE        

 

                          Screening urine tricyclic antidepressants detection   

        NEGATIVE          

                                        NEGATIVE        

 

                    Urine methadone detection by screening method           NEGA

TIVE            

NEGATIVE        

 

                    Urine oxycodone detection           NEGATIVE            NEGA

TIVE        

 

                    Urine propoxyphene detection           NEGATIVE            N

EGATIVE        

 

                                        Complete blood count (CBC) with automate

d white blood cell (WBC) differential - 

18 05:48         

 

                          Blood leukocytes automated count (number/volume)      

     11.6 10*3/uL         

                                        4.3-11.0        

 

                          Blood erythrocytes automated count (number/volume)    

       3.74 10*6/uL       

                                        4.35-5.85        

 

                    Venous blood hemoglobin measurement (mass/volume)           

8.9 g/dL            

11.5-16.0        

 

                    Blood hematocrit (volume fraction)           29 %           

     35-52        

 

                    Automated erythrocyte mean corpuscular volume           76 [

foz_us]           

80-99        

 

                                        Automated erythrocyte mean corpuscular h

emoglobin (mass per erythrocyte)        

                          24 pg                     25-34        

 

                                        Automated erythrocyte mean corpuscular h

emoglobin concentration measurement 

(mass/volume)             31 g/dL                   32-36        

 

                    Automated erythrocyte distribution width ratio           15.

3 %              10.0-

14.5        

 

                    Automated blood platelet count (count/volume)           240 

10*3/uL           

130-400        

 

                          Automated blood platelet mean volume measurement      

     11.0 [foz_us]        

                                        7.4-10.4        

 

                    Automated blood neutrophils/100 leukocytes           68 %   

             42-75       

 

 

                    Automated blood lymphocytes/100 leukocytes           22 %   

             12-44       

 

 

                    Blood monocytes/100 leukocytes           9 %                

 0-12        

 

                    Automated blood eosinophils/100 leukocytes           1 %    

             0-10        

 

                    Automated blood basophils/100 leukocytes           0 %      

           0-10        

 

                    Blood neutrophils automated count (number/volume)           

7.9 10*3            

1.8-7.8        

 

                    Blood lymphocytes automated count (number/volume)           

2.6 10*3            

1.0-4.0        

 

                    Blood monocytes automated count (number/volume)           1.

0 10*3            

0.0-1.0        

 

                    Automated eosinophil count           0.1 10*3/uL           0

.0-0.3        

 

                    Automated blood basophil count (count/volume)           0.0 

10*3/uL           

0.0-0.1        

 

                                        Complete blood count (CBC) with automate

d white blood cell (WBC) differential - 

18 18:07         

 

                          Blood leukocytes automated count (number/volume)      

     11.7 10*3/uL         

                                        4.3-11.0        

 

                          Blood erythrocytes automated count (number/volume)    

       4.28 10*6/uL       

                                        4.35-5.85        

 

                    Venous blood hemoglobin measurement (mass/volume)           

11.8 g/dL           

11.5-16.0        

 

                    Blood hematocrit (volume fraction)           36 %           

     35-52        

 

                    Automated erythrocyte mean corpuscular volume           83 [

foz_us]           

80-99        

 

                                        Automated erythrocyte mean corpuscular h

emoglobin (mass per erythrocyte)        

                          28 pg                     25-34        

 

                                        Automated erythrocyte mean corpuscular h

emoglobin concentration measurement 

(mass/volume)             33 g/dL                   32-36        

 

                    Automated erythrocyte distribution width ratio           13.

7 %              10.0-

14.5        

 

                    Automated blood platelet count (count/volume)           326 

10*3/uL           

130-400        

 

                          Automated blood platelet mean volume measurement      

     10.3 [foz_us]        

                                        7.4-10.4        

 

                    Automated blood neutrophils/100 leukocytes           70 %   

             42-75       

 

 

                    Automated blood lymphocytes/100 leukocytes           19 %   

             12-44       

 

 

                    Blood monocytes/100 leukocytes           10 %               

 0-12        

 

                    Automated blood eosinophils/100 leukocytes           1 %    

             0-10        

 

                    Automated blood basophils/100 leukocytes           0 %      

           0-10        

 

                    Blood neutrophils automated count (number/volume)           

8.2 10*3            

1.8-7.8        

 

                    Blood lymphocytes automated count (number/volume)           

2.2 10*3            

1.0-4.0        

 

                    Blood monocytes automated count (number/volume)           1.

2 10*3            

0.0-1.0        

 

                    Automated eosinophil count           0.1 10*3/uL           0

.0-0.3        

 

                    Automated blood basophil count (count/volume)           0.0 

10*3/uL           

0.0-0.1        

 

                                        Serum or plasma choriogonadotropin (preg

joyce test) detection - 18 18:07  

       

 

                          Serum or plasma choriogonadotropin (pregnancy test) de

tection           POSITIVE

                                        NEGATIVE        

 

                                        Comprehensive metabolic panel - 18

 18:07         

 

                          Serum or plasma sodium measurement (moles/volume)     

      138 mmol/L          

                                        135-145        

 

                          Serum or plasma potassium measurement (moles/volume)  

         3.0 mmol/L       

                                        3.6-5.0        

 

                          Serum or plasma chloride measurement (moles/volume)   

        107 mmol/L        

                                                

 

                    Carbon dioxide           22 mmol/L           21-32        

 

                          Serum or plasma anion gap determination (moles/volume)

           9 mmol/L       

                                        5-14        

 

                          Serum or plasma urea nitrogen measurement (mass/volume

)           8 mg/dL       

                                        7-18        

 

                          Serum or plasma creatinine measurement (mass/volume)  

         0.65 mg/dL       

                                        0.60-1.30        

 

                    Serum or plasma urea nitrogen/creatinine mass ratio         

  12                  NRG 

       

 

                                        Serum or plasma creatinine measurement w

ith calculation of estimated glomerular 

filtration rate           >                         NRG        

 

                    Serum or plasma glucose measurement (mass/volume)           

77 mg/dL            

        

 

                    Serum or plasma calcium measurement (mass/volume)           

9.4 mg/dL           

8.5-10.1        

 

                          Serum or plasma total bilirubin measurement (mass/volu

me)           0.3 mg/dL   

                                        0.1-1.0        

 

                                        Serum or plasma alkaline phosphatase vanessa

surement (enzymatic activity/volume)    

                          55 U/L                            

 

                                        Serum or plasma aspartate aminotransfera

se measurement (enzymatic 

activity/volume)           12 U/L                    5-34        

 

                                        Serum or plasma alanine aminotransferase

 measurement (enzymatic activity/volume)

                          11 U/L                    0-55        

 

                    Serum or plasma protein measurement (mass/volume)           

7.1 g/dL            

6.4-8.2        

 

                    Serum or plasma albumin measurement (mass/volume)           

4.1 g/dL            

3.2-4.5        

 

                    CALCIUM CORRECTED           9.3 mg/dL           8.5-10.1    

    

 

                                        Magnesium - 18 18:07         

 

                    Magnesium           2.1 mg/dL           1.8-2.4        

 

                                        PT panel in platelet poor plasma by coag

ulation assay - 18 18:07         

 

                          Prothrombin time (PT) in platelet poor plasma by coagu

lation assay           

13.8 s                                  12.2-14.7        

 

                          INR in platelet poor plasma or blood by coagulation as

say           1.1         

                                        0.8-1.4        

 

                                        Activated partial thromboplastin time (a

PTT) in platelet poor plasma 

bycoagulation assay - 18 18:07         

 

                                        Activated partial thromboplastin time (a

PTT) in platelet poor plasma 

bycoagulation assay           30 s                      24-35        

 

                                        Fibrin D-dimer FEU measurement in platel

et poor plasma (mass/volume) - 18 

18:07         

 

                          Fibrin D-dimer FEU measurement in platelet poor plasma

 (mass/volume)           

0.24 ug/mL                              0.00-0.49        

 

                                        Serum or plasma troponin i.cardiac measu

rement (mass/volume) - 18 18:07   

      

 

                          Serum or plasma troponin i.cardiac measurement (mass/v

olume)           < ng/mL  

                                        <0.30        

 

                                        Myoglobin, serum - 18 18:07       

  

 

                    Myoglobin, serum           21.3 ng/mL           10.0-92.0   

     

 

                                        Lipase - 18 18:07         

 

                    Lipase              38 U/L              8-78        

 

                                        Urine drug screening test - 18 18:

15         

 

                    Urine phencyclidine detection by screening method           

NEGATIVE            

NEGATIVE        

 

                          Urine benzodiazepines detection by screening method   

        NEGATIVE          

                                        NEGATIVE        

 

                    Urine cocaine detection           NEGATIVE            NEGATI

VE        

 

                    Urine amphetamines detection by screening method           P

OSITIVE            

NEGATIVE        

 

                          Urine methamphetamine detection by screening method   

        NEGATIVE          

                                        NEGATIVE        

 

                    Urine cannabinoids detection by screening method           N

EGATIVE            

NEGATIVE        

 

                    Urine opiates detection by screening method           NEGATI

VE            

NEGATIVE        

 

                    Urine barbiturates detection           NEGATIVE            N

EGATIVE        

 

                          Screening urine tricyclic antidepressants detection   

        NEGATIVE          

                                        NEGATIVE        

 

                    Urine methadone detection by screening method           NEGA

TIVE            

NEGATIVE        

 

                    Urine oxycodone detection           NEGATIVE            NEGA

TIVE        

 

                    Urine propoxyphene detection           NEGATIVE            N

EGATIVE        

 

                                        SYPHILIS (RPR W/ REFLEX CONFIRMATION) - 

19 12:29         

 

                          RPR (DX) W/REFL TITER AND CONFIRMATORY TESTING        

   NON-REACTIVE           

                                        NON-REACTIVE        

 

                                        Complete urinalysis with reflex to cultu

re - 19 12:15         

 

                    Urine color determination           YELLOW              NRG 

       

 

                    Urine clarity determination           CLEAR               NR

G        

 

                    Urine pH measurement by test strip           7              

     5-9        

 

                    Specific gravity of urine by test strip           1.010     

          1.016-1.022  

      

 

                    Urine protein assay by test strip, semi-quantitative        

   1+                  

NEGATIVE        

 

                    Urine glucose detection by automated test strip           NE

GATIVE            

NEGATIVE        

 

                          Erythrocytes detection in urine sediment by light micr

oscopy           NEGATIVE 

                                        NEGATIVE        

 

                    Urine ketones detection by automated test strip           NE

GATIVE            

NEGATIVE        

 

                    Urine nitrite detection by test strip           NEGATIVE    

        NEGATIVE    

    

 

                    Urine total bilirubin detection by test strip           NEGA

TIVE            

NEGATIVE        

 

                          Urine urobilinogen measurement by automated test strip

 (mass/volume)           

NORMAL                                  NORMAL        

 

                    Urine leukocyte esterase detection by dipstick           3+ 

                 NEGATIVE 

       

 

                                        Automated urine sediment erythrocyte cou

nt by microscopy (number/high power 

field)                    NONE                      NRG        

 

                                        Automated urine sediment leukocyte count

 by microscopy (number/high power field)

                           [HPF]                    NRG        

 

                          Bacteria detection in urine sediment by light microsco

py           NEGATIVE     

                                        NRG        

 

                                        Squamous epithelial cells detection in u

rine sediment by light microscopy       

                          5-10                      NRG        

 

                          Crystals detection in urine sediment by light microsco

py           NONE         

                                        NRG        

 

                    Casts detection in urine sediment by light microscopy       

    NONE                

NRG        

 

                          Mucus detection in urine sediment by light microscopy 

          NEGATIVE        

                                        NRG        

 

                    Complete urinalysis with reflex to culture           CULTURE

 PENDING            

NRG        

 

                          Urine Trichomonas species detection by light microscop

y           MODERATE      

                                        NRG        

 

                                        Urine drug screening test - 19 12:

15         

 

                    Urine phencyclidine detection by screening method           

NEGATIVE            

NEGATIVE        

 

                          Urine benzodiazepines detection by screening method   

        NEGATIVE          

                                        NEGATIVE        

 

                    Urine cocaine detection           NEGATIVE            NEGATI

VE        

 

                    Urine amphetamines detection by screening method           N

EGATIVE            

NEGATIVE        

 

                          Urine methamphetamine detection by screening method   

        NEGATIVE          

                                        NEGATIVE        

 

                    Urine cannabinoids detection by screening method           N

EGATIVE            

NEGATIVE        

 

                    Urine opiates detection by screening method           NEGATI

VE            

NEGATIVE        

 

                    Urine barbiturates detection           NEGATIVE            N

EGATIVE        

 

                          Screening urine tricyclic antidepressants detection   

        NEGATIVE          

                                        NEGATIVE        

 

                    Urine methadone detection by screening method           NEGA

TIVE            

NEGATIVE        

 

                    Urine oxycodone detection           NEGATIVE            NEGA

TIVE        

 

                    Urine propoxyphene detection           NEGATIVE            N

EGATIVE        

 

                                        Bacterial urine culture - 19 12:15

         

 

                    Bacterial urine culture           3 OR MORE            NRG  

      

 

                    COLONY COUNT           >100,000/ML            NRG        

 

                    FTX;REPORTABLE           SUGGESTING PROBABLE COLLECTION     

       NRG        

 

                    FREE TEXT ENTRY 2           CONTAMINATION WITH SKIN GANESH   

         NRG        

 

                    FREE TEXT ENTRY 3           NO SUSCEPTIBILITY PERFORMED     

       NRG        

 

                                        Urine [employer] gonorrhoeae DNA assay - 

19 12:15         

 

                    Gonorrhea amp DNA-urine           Not Detected            No

t Detected        

 

                                        Complete urinalysis with reflex to cultu

re - 19 14:37         

 

                    Urine color determination           YELLOW              NRG 

       

 

                    Urine clarity determination           CLEAR               NR

G        

 

                    Urine pH measurement by test strip           7              

     5-9        

 

                    Specific gravity of urine by test strip           1.005     

          1.016-1.022  

      

 

                          Urine protein assay by test strip, semi-quantitative  

         NEGATIVE         

                                        NEGATIVE        

 

                    Urine glucose detection by automated test strip           NE

GATIVE            

NEGATIVE        

 

                          Erythrocytes detection in urine sediment by light micr

oscopy           NEGATIVE 

                                        NEGATIVE        

 

                    Urine ketones detection by automated test strip           NE

GATIVE            

NEGATIVE        

 

                    Urine nitrite detection by test strip           NEGATIVE    

        NEGATIVE    

    

 

                    Urine total bilirubin detection by test strip           NEGA

TIVE            

NEGATIVE        

 

                          Urine urobilinogen measurement by automated test strip

 (mass/volume)           

NORMAL                                  NORMAL        

 

                    Urine leukocyte esterase detection by dipstick           2+ 

                 NEGATIVE 

       

 

                                        Automated urine sediment erythrocyte cou

nt by microscopy (number/high power 

field)                    NONE                      NRG        

 

                                        Automated urine sediment leukocyte count

 by microscopy (number/high power field)

                           [HPF]                    NRG        

 

                          Bacteria detection in urine sediment by light microsco

py           TRACE        

                                        NRG        

 

                                        Squamous epithelial cells detection in u

rine sediment by light microscopy       

                          5-10                      NRG        

 

                          Crystals detection in urine sediment by light microsco

py           NONE         

                                        NRG        

 

                    Casts detection in urine sediment by light microscopy       

    NONE                

NRG        

 

                          Mucus detection in urine sediment by light microscopy 

          NEGATIVE        

                                        NRG        

 

                    Complete urinalysis with reflex to culture           NO     

             NRG        

 

                                        Urine drug screening test - 19 14:

37         

 

                    Urine phencyclidine detection by screening method           

NEGATIVE            

NEGATIVE        

 

                          Urine benzodiazepines detection by screening method   

        NEGATIVE          

                                        NEGATIVE        

 

                    Urine cocaine detection           NEGATIVE            NEGATI

VE        

 

                    Urine amphetamines detection by screening method           N

EGATIVE            

NEGATIVE        

 

                          Urine methamphetamine detection by screening method   

        NEGATIVE          

                                        NEGATIVE        

 

                    Urine cannabinoids detection by screening method           N

EGATIVE            

NEGATIVE        

 

                    Urine opiates detection by screening method           NEGATI

VE            

NEGATIVE        

 

                    Urine barbiturates detection           NEGATIVE            N

EGATIVE        

 

                          Screening urine tricyclic antidepressants detection   

        NEGATIVE          

                                        NEGATIVE        

 

                    Urine methadone detection by screening method           NEGA

TIVE            

NEGATIVE        

 

                    Urine oxycodone detection           NEGATIVE            NEGA

TIVE        

 

                    Urine propoxyphene detection           NEGATIVE            N

EGATIVE        

 

                                        Complete urinalysis with reflex to cultu

re - 19 20:25         

 

                    Urine color determination           YELLOW              NRG 

       

 

                    Urine clarity determination           CLEAR               NR

G        

 

                    Urine pH measurement by test strip           7              

     5-9        

 

                    Specific gravity of urine by test strip           1.010     

          1.016-1.022  

      

 

                          Urine protein assay by test strip, semi-quantitative  

         NEGATIVE         

                                        NEGATIVE        

 

                    Urine glucose detection by automated test strip           NE

GATIVE            

NEGATIVE        

 

                          Erythrocytes detection in urine sediment by light micr

oscopy           NEGATIVE 

                                        NEGATIVE        

 

                    Urine ketones detection by automated test strip           NE

GATIVE            

NEGATIVE        

 

                    Urine nitrite detection by test strip           NEGATIVE    

        NEGATIVE    

    

 

                    Urine total bilirubin detection by test strip           NEGA

TIVE            

NEGATIVE        

 

                          Urine urobilinogen measurement by automated test strip

 (mass/volume)           

NORMAL                                  NORMAL        

 

                    Urine leukocyte esterase detection by dipstick           1+ 

                 NEGATIVE 

       

 

                                        Automated urine sediment erythrocyte cou

nt by microscopy (number/high power 

field)                    NONE                      NRG        

 

                                        Automated urine sediment leukocyte count

 by microscopy (number/high power field)

                           [HPF]                    NRG        

 

                          Bacteria detection in urine sediment by light microsco

py           TRACE        

                                        NRG        

 

                                        Squamous epithelial cells detection in u

rine sediment by light microscopy       

                          5-10                      NRG        

 

                          Crystals detection in urine sediment by light microsco

py           PRESENT      

                                        NRG        

 

                    Casts detection in urine sediment by light microscopy       

    NONE                

NRG        

 

                          Mucus detection in urine sediment by light microscopy 

          NEGATIVE        

                                        NRG        

 

                    Complete urinalysis with reflex to culture           NO     

             NRG        

 

                          Amorphous sediment detection in urine sediment by ligh

t microscopy           FEW

 EDDIE PHOSPHATE                         NRG        

 

                                        Urine protein/creatinine mass ratio -  20:25         

 

                    Urine protein measurement (mass/volume)           < mg/dL   

          6-12        

 

                    Urine creatinine measurement (mass/volume)           33 mg/d

L              

      

 

                    Urine protein/creatinine mass ratio           TNP           

      NRG        

 

                                        Complete blood count (CBC) with automate

d white blood cell (WBC) differential - 

19 21:40         

 

                          Blood leukocytes automated count (number/volume)      

     9.4 10*3/uL          

                                        4.3-11.0        

 

                          Blood erythrocytes automated count (number/volume)    

       4.07 10*6/uL       

                                        4.35-5.85        

 

                    Venous blood hemoglobin measurement (mass/volume)           

8.9 g/dL            

11.5-16.0        

 

                    Blood hematocrit (volume fraction)           29 %           

     35-52        

 

                    Automated erythrocyte mean corpuscular volume           71 [

foz_us]           

80-99        

 

                                        Automated erythrocyte mean corpuscular h

emoglobin (mass per erythrocyte)        

                          22 pg                     25-34        

 

                                        Automated erythrocyte mean corpuscular h

emoglobin concentration measurement 

(mass/volume)             31 g/dL                   32-36        

 

                    Automated erythrocyte distribution width ratio           16.

7 %              10.0-

14.5        

 

                    Automated blood platelet count (count/volume)           239 

10*3/uL           

130-400        

 

                          Automated blood platelet mean volume measurement      

     11.0 [foz_us]        

                                        7.4-10.4        

 

                    Automated blood neutrophils/100 leukocytes           65 %   

             42-75       

 

 

                    Automated blood lymphocytes/100 leukocytes           23 %   

             12-44       

 

 

                    Blood monocytes/100 leukocytes           11 %               

 0-12        

 

                    Automated blood eosinophils/100 leukocytes           1 %    

             0-10        

 

                    Automated blood basophils/100 leukocytes           0 %      

           0-10        

 

                    Blood neutrophils automated count (number/volume)           

6.1 10*3            

1.8-7.8        

 

                    Blood lymphocytes automated count (number/volume)           

2.1 10*3            

1.0-4.0        

 

                    Blood monocytes automated count (number/volume)           1.

0 10*3            

0.0-1.0        

 

                    Automated eosinophil count           0.1 10*3/uL           0

.0-0.3        

 

                    Automated blood basophil count (count/volume)           0.0 

10*3/uL           

0.0-0.1        

 

                                        Comprehensive metabolic panel - 19

 21:40         

 

                          Serum or plasma sodium measurement (moles/volume)     

      139 mmol/L          

                                        135-145        

 

                          Serum or plasma potassium measurement (moles/volume)  

         3.1 mmol/L       

                                        3.6-5.0        

 

                          Serum or plasma chloride measurement (moles/volume)   

        109 mmol/L        

                                                

 

                    Carbon dioxide           19 mmol/L           21-32        

 

                          Serum or plasma anion gap determination (moles/volume)

           11 mmol/L      

                                        5-14        

 

                          Serum or plasma urea nitrogen measurement (mass/volume

)           4 mg/dL       

                                        7-18        

 

                          Serum or plasma creatinine measurement (mass/volume)  

         0.63 mg/dL       

                                        0.60-1.30        

 

                    Serum or plasma urea nitrogen/creatinine mass ratio         

  6                   NRG  

      

 

                                        Serum or plasma creatinine measurement w

ith calculation of estimated glomerular 

filtration rate           >                         NRG        

 

                    Serum or plasma glucose measurement (mass/volume)           

75 mg/dL            

        

 

                    Serum or plasma calcium measurement (mass/volume)           

8.9 mg/dL           

8.5-10.1        

 

                          Serum or plasma total bilirubin measurement (mass/volu

me)           0.6 mg/dL   

                                        0.1-1.0        

 

                                        Serum or plasma alkaline phosphatase vanessa

surement (enzymatic activity/volume)    

                          131 U/L                           

 

                                        Serum or plasma aspartate aminotransfera

se measurement (enzymatic 

activity/volume)           10 U/L                    5-34        

 

                                        Serum or plasma alanine aminotransferase

 measurement (enzymatic activity/volume)

                          < U/L                     0-55        

 

                    Serum or plasma protein measurement (mass/volume)           

6.8 g/dL            

6.4-8.2        

 

                    Serum or plasma albumin measurement (mass/volume)           

3.5 g/dL            

3.2-4.5        

 

                    CALCIUM CORRECTED           9.3 mg/dL           8.5-10.1    

    

 

                                        Serum or plasma uric acid measurement (m

ass/volume) - 19 21:40         

 

                          Serum or plasma uric acid measurement (mass/volume)   

        4.1 mg/dL         

                                        2.6-7.2        

 

                                        Serum ragweed IgE antibody assay -  21:40         

 

                    Serum ragweed IgE antibody assay           200 U/L          

   125-220        

 

                                        Blood type T Indirect antibody screen [facility] - 19 21:40         

 

                    WRISTBAND NUMBER           [id number]             NRG        

 

                    ABO+Rh group           OP                  NRG        

 

                    Blood group antibody screen           NEGATIVE            NR

G        

 

                                        Whole blood hemoglobin and hematocrit [facility] - 19 10:55         

 

                    Venous blood hemoglobin measurement (mass/volume)           

6.9 g/dL            

11.5-16.0        

 

                    Blood hematocrit (volume fraction)           23 %           

     35-52        

 

                                        Serum or plasma potassium measurement (m

oles/volume) - 19 10:55         

 

                          Serum or plasma potassium measurement (moles/volume)  

         3.5 mmol/L       

                                        3.6-5.0        

 

                                        Complete blood count (CBC) with automate

d white blood cell (WBC) differential - 

19 05:55         

 

                          Blood leukocytes automated count (number/volume)      

     10.2 10*3/uL         

                                        4.3-11.0        

 

                          Blood erythrocytes automated count (number/volume)    

       3.34 10*6/uL       

                                        4.35-5.85        

 

                    Venous blood hemoglobin measurement (mass/volume)           

7.1 g/dL            

11.5-16.0        

 

                    Blood hematocrit (volume fraction)           24 %           

     35-52        

 

                    Automated erythrocyte mean corpuscular volume           73 [

foz_us]           

80-99        

 

                                        Automated erythrocyte mean corpuscular h

emoglobin (mass per erythrocyte)        

                          21 pg                     25-34        

 

                                        Automated erythrocyte mean corpuscular h

emoglobin concentration measurement 

(mass/volume)             29 g/dL                   32-36        

 

                    Automated erythrocyte distribution width ratio           16.

8 %              10.0-

14.5        

 

                    Automated blood platelet count (count/volume)           219 

10*3/uL           

130-400        

 

                          Automated blood platelet mean volume measurement      

     10.1 [foz_us]        

                                        7.4-10.4        

 

                    Automated blood neutrophils/100 leukocytes           75 %   

             42-75       

 

 

                    Automated blood lymphocytes/100 leukocytes           15 %   

             12-44       

 

 

                    Blood monocytes/100 leukocytes           10 %               

 0-12        

 

                    Automated blood eosinophils/100 leukocytes           1 %    

             0-10        

 

                    Automated blood basophils/100 leukocytes           0 %      

           0-10        

 

                    Blood neutrophils automated count (number/volume)           

7.6 10*3            

1.8-7.8        

 

                    Blood lymphocytes automated count (number/volume)           

1.5 10*3            

1.0-4.0        

 

                    Blood monocytes automated count (number/volume)           1.

0 10*3            

0.0-1.0        

 

                    Automated eosinophil count           0.1 10*3/uL           0

.0-0.3        

 

                    Automated blood basophil count (count/volume)           0.0 

10*3/uL           

0.0-0.1        

 

                                        Complete blood count (CBC) with automate

d white blood cell (WBC) differential - 

19 14:57         

 

                          Blood leukocytes automated count (number/volume)      

     7.4 10*3/uL          

                                        4.3-11.0        

 

                          Blood erythrocytes automated count (number/volume)    

       4.26 10*6/uL       

                                        4.35-5.85        

 

                    Venous blood hemoglobin measurement (mass/volume)           

9.4 g/dL            

11.5-16.0        

 

                    Blood hematocrit (volume fraction)           31 %           

     35-52        

 

                    Automated erythrocyte mean corpuscular volume           74 [

foz_us]           

80-99        

 

                                        Automated erythrocyte mean corpuscular h

emoglobin (mass per erythrocyte)        

                          22 pg                     25-34        

 

                                        Automated erythrocyte mean corpuscular h

emoglobin concentration measurement 

(mass/volume)             30 g/dL                   32-36        

 

                    Automated erythrocyte distribution width ratio           22.

7 %              10.0-

14.5        

 

                    Automated blood platelet count (count/volume)           300 

10*3/uL           

130-400        

 

                          Automated blood platelet mean volume measurement      

     11.3 [foz_us]        

                                        7.4-10.4        

 

                    Automated blood neutrophils/100 leukocytes           68 %   

             42-75       

 

 

                    Automated blood lymphocytes/100 leukocytes           23 %   

             12-44       

 

 

                    Blood monocytes/100 leukocytes           8 %                

 0-12        

 

                    Automated blood eosinophils/100 leukocytes           2 %    

             0-10        

 

                    Automated blood basophils/100 leukocytes           0 %      

           0-10        

 

                    Blood neutrophils automated count (number/volume)           

5.0 10*3            

1.8-7.8        

 

                    Blood lymphocytes automated count (number/volume)           

1.7 10*3            

1.0-4.0        

 

                    Blood monocytes automated count (number/volume)           0.

6 10*3            

0.0-1.0        

 

                    Automated eosinophil count           0.1 10*3/uL           0

.0-0.3        

 

                    Automated blood basophil count (count/volume)           0.0 

10*3/uL           

0.0-0.1        

 

                                        Complete urinalysis with reflex to cultu

re - 19 14:57         

 

                    Urine color determination           YELLOW              NRG 

       

 

                    Urine clarity determination           CLEAR               NR

G        

 

                    Urine pH measurement by test strip           6              

     5-9        

 

                    Specific gravity of urine by test strip           1.020     

          1.016-1.022  

      

 

                    Urine protein assay by test strip, semi-quantitative        

   1+                  

NEGATIVE        

 

                    Urine glucose detection by automated test strip           NE

GATIVE            

NEGATIVE        

 

                          Erythrocytes detection in urine sediment by light micr

oscopy           5+       

                                        NEGATIVE        

 

                    Urine ketones detection by automated test strip           NE

GATIVE            

NEGATIVE        

 

                    Urine nitrite detection by test strip           NEGATIVE    

        NEGATIVE    

    

 

                    Urine total bilirubin detection by test strip           NEGA

TIVE            

NEGATIVE        

 

                          Urine urobilinogen measurement by automated test strip

 (mass/volume)           

NORMAL                                  NORMAL        

 

                    Urine leukocyte esterase detection by dipstick           1+ 

                 NEGATIVE 

       

 

                                        Automated urine sediment erythrocyte cou

nt by microscopy (number/high power 

field)                     [HPF]                    NRG        

 

                                        Automated urine sediment leukocyte count

 by microscopy (number/high power field)

                          RARE                      NRG        

 

                          Bacteria detection in urine sediment by light microsco

py           TRACE        

                                        NRG        

 

                                        Squamous epithelial cells detection in u

rine sediment by light microscopy       

                          0-2                       NRG        

 

                          Crystals detection in urine sediment by light microsco

py           NONE         

                                        NRG        

 

                    Casts detection in urine sediment by light microscopy       

    NONE                

NRG        

 

                          Mucus detection in urine sediment by light microscopy 

          NEGATIVE        

                                        NRG        

 

                    Complete urinalysis with reflex to culture           NO     

             NRG        

 

                                        Serum or plasma choriogonadotropin (preg

joyce test) detection - 19 14:57  

       

 

                          Serum or plasma choriogonadotropin (pregnancy test) de

tection           NEGATIVE

                                        NEGATIVE        

 

                                        Comprehensive metabolic panel - 19

 14:57         

 

                          Serum or plasma sodium measurement (moles/volume)     

      139 mmol/L          

                                        135-145        

 

                          Serum or plasma potassium measurement (moles/volume)  

         4.1 mmol/L       

                                        3.6-5.0        

 

                          Serum or plasma chloride measurement (moles/volume)   

        110 mmol/L        

                                                

 

                    Carbon dioxide           19 mmol/L           21-32        

 

                          Serum or plasma anion gap determination (moles/volume)

           10 mmol/L      

                                        5-14        

 

                          Serum or plasma urea nitrogen measurement (mass/volume

)           9 mg/dL       

                                        7-18        

 

                          Serum or plasma creatinine measurement (mass/volume)  

         0.79 mg/dL       

                                        0.60-1.30        

 

                    Serum or plasma urea nitrogen/creatinine mass ratio         

  11                  NRG 

       

 

                                        Serum or plasma creatinine measurement w

ith calculation of estimated glomerular 

filtration rate           >                         NRG        

 

                    Serum or plasma glucose measurement (mass/volume)           

85 mg/dL            

        

 

                    Serum or plasma calcium measurement (mass/volume)           

8.7 mg/dL           

8.5-10.1        

 

                          Serum or plasma total bilirubin measurement (mass/volu

me)           0.2 mg/dL   

                                        0.1-1.0        

 

                                        Serum or plasma alkaline phosphatase vanessa

surement (enzymatic activity/volume)    

                          43 U/L                            

 

                                        Serum or plasma aspartate aminotransfera

se measurement (enzymatic 

activity/volume)           17 U/L                    5-34        

 

                                        Serum or plasma alanine aminotransferase

 measurement (enzymatic activity/volume)

                          9 U/L                     0-55        

 

                    Serum or plasma protein measurement (mass/volume)           

7.0 g/dL            

6.4-8.2        

 

                    Serum or plasma albumin measurement (mass/volume)           

4.1 g/dL            

3.2-4.5        

 

                    CALCIUM CORRECTED           8.6 mg/dL           8.5-10.1    

    

 

                                        Complete blood count (CBC) with automate

d white blood cell (WBC) differential - 

10/08/19 19:21         

 

                          Blood leukocytes automated count (number/volume)      

     11.6 10*3/uL         

                                        4.3-11.0        

 

                          Blood erythrocytes automated count (number/volume)    

       4.64 10*6/uL       

                                        4.35-5.85        

 

                    Venous blood hemoglobin measurement (mass/volume)           

10.8 g/dL           

11.5-16.0        

 

                    Blood hematocrit (volume fraction)           34 %           

     35-52        

 

                    Automated erythrocyte mean corpuscular volume           74 [

foz_us]           

80-99        

 

                                        Automated erythrocyte mean corpuscular h

emoglobin (mass per erythrocyte)        

                          23 pg                     25-34        

 

                                        Automated erythrocyte mean corpuscular h

emoglobin concentration measurement 

(mass/volume)             32 g/dL                   32-36        

 

                    Automated erythrocyte distribution width ratio           19.

6 %              10.0-

14.5        

 

                    Automated blood platelet count (count/volume)           316 

10*3/uL           

130-400        

 

                          Automated blood platelet mean volume measurement      

     10.8 [foz_us]        

                                        7.4-10.4        

 

                    Automated blood neutrophils/100 leukocytes           76 %   

             42-75       

 

 

                    Automated blood lymphocytes/100 leukocytes           14 %   

             12-44       

 

 

                    Blood monocytes/100 leukocytes           9 %                

 0-12        

 

                    Automated blood eosinophils/100 leukocytes           1 %    

             0-10        

 

                    Automated blood basophils/100 leukocytes           0 %      

           0-10        

 

                    Blood neutrophils automated count (number/volume)           

8.8 10*3            

1.8-7.8        

 

                    Blood lymphocytes automated count (number/volume)           

1.6 10*3            

1.0-4.0        

 

                    Blood monocytes automated count (number/volume)           1.

0 10*3            

0.0-1.0        

 

                    Automated eosinophil count           0.1 10*3/uL           0

.0-0.3        

 

                    Automated blood basophil count (count/volume)           0.0 

10*3/uL           

0.0-0.1        

 

                                        PT panel in platelet poor plasma by coag

ulation assay - 10/08/19 19:21         

 

                          Prothrombin time (PT) in platelet poor plasma by coagu

lation assay           

14.4 s                                  12.2-14.7        

 

                          INR in platelet poor plasma or blood by coagulation as

say           1.1         

                                        0.8-1.4        

 

                                        Activated partial thromboplastin time (a

PTT) in platelet poor plasma 

bycoagulation assay - 10/08/19 19:21         

 

                                        Activated partial thromboplastin time (a

PTT) in platelet poor plasma 

bycoagulation assay           28 s                      24-35        

 

                                        Comprehensive metabolic panel - 10/08/19

 19:21         

 

                          Serum or plasma sodium measurement (moles/volume)     

      140 mmol/L          

                                        135-145        

 

                          Serum or plasma potassium measurement (moles/volume)  

         3.6 mmol/L       

                                        3.6-5.0        

 

                          Serum or plasma chloride measurement (moles/volume)   

        109 mmol/L        

                                                

 

                    Carbon dioxide           21 mmol/L           21-32        

 

                          Serum or plasma anion gap determination (moles/volume)

           10 mmol/L      

                                        5-14        

 

                          Serum or plasma urea nitrogen measurement (mass/volume

)           12 mg/dL      

                                        7-18        

 

                          Serum or plasma creatinine measurement (mass/volume)  

         0.78 mg/dL       

                                        0.60-1.30        

 

                    Serum or plasma urea nitrogen/creatinine mass ratio         

  15                  NRG 

       

 

                                        Serum or plasma creatinine measurement w

ith calculation of estimated glomerular 

filtration rate           >                         NRG        

 

                    Serum or plasma glucose measurement (mass/volume)           

87 mg/dL            

        

 

                    Serum or plasma calcium measurement (mass/volume)           

9.2 mg/dL           

8.5-10.1        

 

                          Serum or plasma total bilirubin measurement (mass/volu

me)           0.3 mg/dL   

                                        0.1-1.0        

 

                                        Serum or plasma alkaline phosphatase vanessa

surement (enzymatic activity/volume)    

                          58 U/L                            

 

                                        Serum or plasma aspartate aminotransfera

se measurement (enzymatic 

activity/volume)           12 U/L                    5-34        

 

                                        Serum or plasma alanine aminotransferase

 measurement (enzymatic activity/volume)

                          13 U/L                    0-55        

 

                    Serum or plasma protein measurement (mass/volume)           

7.4 g/dL            

6.4-8.2        

 

                    Serum or plasma albumin measurement (mass/volume)           

4.4 g/dL            

3.2-4.5        

 

                    CALCIUM CORRECTED           8.9 mg/dL           8.5-10.1    

    

 

                                        Magnesium - 10/08/19 19:21         

 

                    Magnesium           2.0 mg/dL           1.6-2.4        

 

                                        Serum or plasma creatine kinase measurem

ent (enzymatic activity/volume) - 

10/08/19 19:21         

 

                                        Serum or plasma creatine kinase measurem

ent (enzymatic activity/volume)         

                          63 U/L                            

 

                                        Serum or plasma creatine kinase MB measu

rement (enzymatic activity/volume) - 

10/08/19 19:21         

 

                                        Serum or plasma creatine kinase MB measu

rement (enzymatic activity/volume)      

                          0.4 ng/mL                 <6.6        

 

                                        Serum or plasma troponin i.cardiac measu

rement (mass/volume) - 10/08/19 19:21   

      

 

                          Serum or plasma troponin i.cardiac measurement (mass/v

olume)           < ng/mL  

                                        <0.028        

 

                                        Myoglobin, serum - 10/08/19 19:21       

  

 

                    Myoglobin, serum           58.0 ng/mL           10.0-92.0   

     

 

                                        Serum or plasma lithium measurement (mol

es/volume) - 10/08/19 19:21         

 

                    BNP PT              < 10.0              <100.0        

 

                                        Serum or plasma amylase measurement (enz

ymatic activity/volume) - 10/08/19 19:21

         

 

                          Serum or plasma amylase measurement (enzymatic activit

y/volume)           88 U/L

                                                

 

                                        Serum or plasma thyrotropin measurement 

by detection limit <=0.05 miu/l 

(units/volume) - 10/08/19 19:21         

 

                                        Serum or plasma thyrotropin measurement 

by detection limit <=0.05 miu/l 

(units/volume)            1.37 u[iU]/mL             0.35-4.94        

 

                                        Serum or plasma acetaminophen measuremen

t (mass/volume) - 10/08/19 19:21        

 

 

                          Serum or plasma acetaminophen measurement (mass/volume

)           < ug/mL       

                                        10-30        

 

                                        Serum or plasma ethanol measurement (mas

s/volume) - 10/08/19 19:21         

 

                    Serum or plasma ethanol measurement (mass/volume)           

< mg/dL             

<10        

 

                                        Complete urinalysis with reflex to cultu

re - 10/08/19 19:25         

 

                    Urine color determination           YELLOW              NRG 

       

 

                    Urine clarity determination           CLEAR               NR

G        

 

                    Urine pH measurement by test strip           5              

     5-9        

 

                    Specific gravity of urine by test strip           1.015     

          1.016-1.022  

      

 

                          Urine protein assay by test strip, semi-quantitative  

         NEGATIVE         

                                        NEGATIVE        

 

                    Urine glucose detection by automated test strip           NE

GATIVE            

NEGATIVE        

 

                          Erythrocytes detection in urine sediment by light micr

oscopy           NEGATIVE 

                                        NEGATIVE        

 

                    Urine ketones detection by automated test strip           NE

GATIVE            

NEGATIVE        

 

                    Urine nitrite detection by test strip           NEGATIVE    

        NEGATIVE    

    

 

                    Urine total bilirubin detection by test strip           NEGA

TIVE            

NEGATIVE        

 

                          Urine urobilinogen measurement by automated test strip

 (mass/volume)           

NORMAL                                  NORMAL        

 

                    Urine leukocyte esterase detection by dipstick           NEG

ATIVE            

NEGATIVE        

 

                                        Automated urine sediment erythrocyte cou

nt by microscopy (number/high power 

field)                    NONE                      NRG        

 

                                        Automated urine sediment leukocyte count

 by microscopy (number/high power field)

                          RARE                      NRG        

 

                          Bacteria detection in urine sediment by light microsco

py           TRACE        

                                        NRG        

 

                                        Squamous epithelial cells detection in u

rine sediment by light microscopy       

                          5-10                      NRG        

 

                          Crystals detection in urine sediment by light microsco

py           NONE         

                                        NRG        

 

                    Casts detection in urine sediment by light microscopy       

    NONE                

NRG        

 

                          Mucus detection in urine sediment by light microscopy 

          NEGATIVE        

                                        NRG        

 

                    Complete urinalysis with reflex to culture           NO     

             NRG        

 

                                        Urine drug screening test - 10/08/19 19:

25         

 

                    Urine phencyclidine detection by screening method           

NEGATIVE            

NEGATIVE        

 

                          Urine benzodiazepines detection by screening method   

        NEGATIVE          

                                        NEGATIVE        

 

                    Urine cocaine detection           NEGATIVE            NEGATI

VE        

 

                    Urine amphetamines detection by screening method           N

EGATIVE            

NEGATIVE        

 

                          Urine methamphetamine detection by screening method   

        NEGATIVE          

                                        NEGATIVE        

 

                    Urine cannabinoids detection by screening method           N

EGATIVE            

NEGATIVE        

 

                    Urine opiates detection by screening method           NEGATI

VE            

NEGATIVE        

 

                    Urine barbiturates detection           NEGATIVE            N

EGATIVE        

 

                          Screening urine tricyclic antidepressants detection   

        NEGATIVE          

                                        NEGATIVE        

 

                    Urine methadone detection by screening method           NEGA

TIVE            

NEGATIVE        

 

                    Urine oxycodone detection           NEGATIVE            NEGA

TIVE        

 

                    Urine propoxyphene detection           NEGATIVE            N

EGATIVE        

 

                                        Complete blood count (CBC) with automate

d white blood cell (WBC) differential - 

19 15:10         

 

                          Blood leukocytes automated count (number/volume)      

     8.1 10*3/uL          

                                        4.3-11.0        

 

                          Blood erythrocytes automated count (number/volume)    

       4.39 10*6/uL       

                                        4.35-5.85        

 

                    Venous blood hemoglobin measurement (mass/volume)           

10.3 g/dL           

11.5-16.0        

 

                    Blood hematocrit (volume fraction)           33 %           

     35-52        

 

                    Automated erythrocyte mean corpuscular volume           74 [

foz_us]           

80-99        

 

                                        Automated erythrocyte mean corpuscular h

emoglobin (mass per erythrocyte)        

                          23 pg                     25-34        

 

                                        Automated erythrocyte mean corpuscular h

emoglobin concentration measurement 

(mass/volume)             32 g/dL                   32-36        

 

                    Automated erythrocyte distribution width ratio           16.

7 %              10.0-

14.5        

 

                    Automated blood platelet count (count/volume)           311 

10*3/uL           

130-400        

 

                          Automated blood platelet mean volume measurement      

     10.7 [foz_us]        

                                        7.4-10.4        

 

                    Automated blood neutrophils/100 leukocytes           77 %   

             42-75       

 

 

                    Automated blood lymphocytes/100 leukocytes           15 %   

             12-44       

 

 

                    Blood monocytes/100 leukocytes           7 %                

 0-12        

 

                    Automated blood eosinophils/100 leukocytes           1 %    

             0-10        

 

                    Automated blood basophils/100 leukocytes           0 %      

           0-10        

 

                    Blood neutrophils automated count (number/volume)           

6.3 10*3            

1.8-7.8        

 

                    Blood lymphocytes automated count (number/volume)           

1.2 10*3            

1.0-4.0        

 

                    Blood monocytes automated count (number/volume)           0.

6 10*3            

0.0-1.0        

 

                    Automated eosinophil count           0.0 10*3/uL           0

.0-0.3        

 

                    Automated blood basophil count (count/volume)           0.0 

10*3/uL           

0.0-0.1        

 

                                        Comprehensive metabolic panel - 19

 15:10         

 

                          Serum or plasma sodium measurement (moles/volume)     

      140 mmol/L          

                                        135-145        

 

                          Serum or plasma potassium measurement (moles/volume)  

         3.8 mmol/L       

                                        3.6-5.0        

 

                          Serum or plasma chloride measurement (moles/volume)   

        109 mmol/L        

                                                

 

                    Carbon dioxide           23 mmol/L           21-32        

 

                          Serum or plasma anion gap determination (moles/volume)

           8 mmol/L       

                                        5-14        

 

                          Serum or plasma urea nitrogen measurement (mass/volume

)           11 mg/dL      

                                        7-18        

 

                          Serum or plasma creatinine measurement (mass/volume)  

         0.80 mg/dL       

                                        0.60-1.30        

 

                    Serum or plasma urea nitrogen/creatinine mass ratio         

  14                  NRG 

       

 

                                        Serum or plasma creatinine measurement w

ith calculation of estimated glomerular 

filtration rate           >                         NRG        

 

                    Serum or plasma glucose measurement (mass/volume)           

90 mg/dL            

        

 

                    Serum or plasma calcium measurement (mass/volume)           

9.0 mg/dL           

8.5-10.1        

 

                          Serum or plasma total bilirubin measurement (mass/volu

me)           0.4 mg/dL   

                                        0.1-1.0        

 

                                        Serum or plasma alkaline phosphatase vanessa

surement (enzymatic activity/volume)    

                          54 U/L                            

 

                                        Serum or plasma aspartate aminotransfera

se measurement (enzymatic 

activity/volume)           13 U/L                    5-34        

 

                                        Serum or plasma alanine aminotransferase

 measurement (enzymatic activity/volume)

                          10 U/L                    0-55        

 

                    Serum or plasma protein measurement (mass/volume)           

7.4 g/dL            

6.4-8.2        

 

                    Serum or plasma albumin measurement (mass/volume)           

4.6 g/dL            

3.2-4.5        

 

                                        Serum or plasma C reactive protein measu

rement (mass/volume) - 19 15:10   

      

 

                          Serum or plasma C reactive protein measurement (mass/v

olume)           0.04 

mg/dL                                   0.00-0.50        

 

                                        Serum or plasma salicylates measurement 

(mass/volume) - 19 15:10         

 

                          Serum or plasma salicylates measurement (mass/volume) 

          < mg/dL         

                                        5.0-20.0        

 

                                        Serum or plasma acetaminophen measuremen

t (mass/volume) - 19 15:10        

 

 

                          Serum or plasma acetaminophen measurement (mass/volume

)           < ug/mL       

                                        10-30        

 

                                        Serum or plasma ethanol measurement (mas

s/volume) - 19 15:10         

 

                    Serum or plasma ethanol measurement (mass/volume)           

< mg/dL             

<10        

 

                                        Complete urinalysis with reflex to cultu

re - 19 16:15         

 

                    Urine color determination           RED                 NRG 

       

 

                    Urine clarity determination           CLOUDY              NR

G        

 

                    Urine pH measurement by test strip           5.5            

     5-9        

 

                    Specific gravity of urine by test strip           1.025     

          1.016-1.022  

      

 

                    Urine protein assay by test strip, semi-quantitative        

   2+                  

NEGATIVE        

 

                    Urine glucose detection by automated test strip           NE

GATIVE            

NEGATIVE        

 

                          Erythrocytes detection in urine sediment by light micr

oscopy           3+       

                                        NEGATIVE        

 

                    Urine ketones detection by automated test strip           NE

GATIVE            

NEGATIVE        

 

                    Urine nitrite detection by test strip           NEGATIVE    

        NEGATIVE    

    

 

                    Urine total bilirubin detection by test strip           NEGA

TIVE            

NEGATIVE        

 

                          Urine urobilinogen measurement by automated test strip

 (mass/volume)           

0.2 mg/dL                               < = 1.0        

 

                    Urine leukocyte esterase detection by dipstick           2+ 

                 NEGATIVE 

       

 

                                        Automated urine sediment erythrocyte cou

nt by microscopy (number/high power 

field)                    TNTC                      NRG        

 

                                        Automated urine sediment leukocyte count

 by microscopy (number/high power field)

                           [HPF]                    NRG        

 

                          Bacteria detection in urine sediment by light microsco

py           NEGATIVE     

                                        NRG        

 

                          Crystals detection in urine sediment by light microsco

py           NONE         

                                        NRG        

 

                    Casts detection in urine sediment by light microscopy       

    NONE                

NRG        

 

                          Mucus detection in urine sediment by light microscopy 

          NEGATIVE        

                                        NRG        

 

                    Complete urinalysis with reflex to culture           NO     

             NRG        

 

                                        Urine drug screening test - 19 16:

15         

 

                    Urine phencyclidine detection by screening method           

NEGATIVE            

NEGATIVE        

 

                          Urine benzodiazepines detection by screening method   

        NEGATIVE          

                                        NEGATIVE        

 

                    Urine cocaine detection           NEGATIVE            NEGATI

VE        

 

                    Urine amphetamines detection by screening method           N

EGATIVE            

NEGATIVE        

 

                          Urine methamphetamine detection by screening method   

        NEGATIVE          

                                        NEGATIVE        

 

                    Urine cannabinoids detection by screening method           N

EGATIVE            

NEGATIVE        

 

                    Urine opiates detection by screening method           NEGATI

VE            

NEGATIVE        

 

                    Urine barbiturates detection           NEGATIVE            N

EGATIVE        

 

                          Screening urine tricyclic antidepressants detection   

        NEGATIVE          

                                        NEGATIVE        

 

                    Urine methadone detection by screening method           NEGA

TIVE            

NEGATIVE        

 

                    Urine oxycodone detection           NEGATIVE            NEGA

TIVE        

 

                    Urine propoxyphene detection           NEGATIVE            N

EGATIVE        

 

                                        Complete blood count (CBC) with automate

d white blood cell (WBC) differential - 

19 20:47         

 

                          Blood leukocytes automated count (number/volume)      

     10.2 10*3/uL         

                                        4.3-11.0        

 

                          Blood erythrocytes automated count (number/volume)    

       4.52 10*6/uL       

                                        4.35-5.85        

 

                    Venous blood hemoglobin measurement (mass/volume)           

10.6 g/dL           

11.5-16.0        

 

                    Blood hematocrit (volume fraction)           34 %           

     35-52        

 

                    Automated erythrocyte mean corpuscular volume           75 [

foz_us]           

80-99        

 

                                        Automated erythrocyte mean corpuscular h

emoglobin (mass per erythrocyte)        

                          23 pg                     25-34        

 

                                        Automated erythrocyte mean corpuscular h

emoglobin concentration measurement 

(mass/volume)             32 g/dL                   32-36        

 

                    Automated erythrocyte distribution width ratio           15.

9 %              10.0-

14.5        

 

                    Automated blood platelet count (count/volume)           377 

10*3/uL           

130-400        

 

                          Automated blood platelet mean volume measurement      

     10.7 [[id number]]        

                                        7.4-10.4        

 

                    Automated blood neutrophils/100 leukocytes           69 %   

             42-75       

 

 

                    Automated blood lymphocytes/100 leukocytes           23 %   

             12-44       

 

 

                    Blood monocytes/100 leukocytes           7 %                

 0-12        

 

                    Automated blood eosinophils/100 leukocytes           1 %    

             0-10        

 

                    Automated blood basophils/100 leukocytes           0 %      

           0-10        

 

                    Blood neutrophils automated count (number/volume)           

7.0 10*3            

1.8-7.8        

 

                    Blood lymphocytes automated count (number/volume)           

2.3 10*3            

1.0-4.0        

 

                    Blood monocytes automated count (number/volume)           0.

8 10*3            

0.0-1.0        

 

                    Automated eosinophil count           0.1 10*3/uL           0

.0-0.3        

 

                    Automated blood basophil count (count/volume)           0.0 

10*3/uL           

0.0-0.1        

 

                                        Serum or plasma choriogonadotropin (preg

joyce test) detection - 19 20:47  

       

 

                          Serum or plasma choriogonadotropin (pregnancy test) de

tection           NEGATIVE

                                        NEGATIVE        

 

                                        Comprehensive metabolic panel - 19

 20:47         

 

                          Serum or plasma sodium measurement (moles/volume)     

      140 mmol/L          

                                        135-145        

 

                          Serum or plasma potassium measurement (moles/volume)  

         3.8 mmol/L       

                                        3.6-5.0        

 

                          Serum or plasma chloride measurement (moles/volume)   

        107 mmol/L        

                                                

 

                    Carbon dioxide           22 mmol/L           21-32        

 

                          Serum or plasma anion gap determination (moles/volume)

           11 mmol/L      

                                        5-14        

 

                          Serum or plasma urea nitrogen measurement (mass/volume

)           11 mg/dL      

                                        7-18        

 

                          Serum or plasma creatinine measurement (mass/volume)  

         0.83 mg/dL       

                                        0.60-1.30        

 

                    Serum or plasma urea nitrogen/creatinine mass ratio         

  13                  NRG 

       

 

                                        Serum or plasma creatinine measurement w

ith calculation of estimated glomerular 

filtration rate           >                         NRG        

 

                    Serum or plasma glucose measurement (mass/volume)           

86 mg/dL            

        

 

                    Serum or plasma calcium measurement (mass/volume)           

9.5 mg/dL           

8.5-10.1        

 

                          Serum or plasma total bilirubin measurement (mass/volu

me)           0.4 mg/dL   

                                        0.1-1.0        

 

                                        Serum or plasma alkaline phosphatase vanessa

surement (enzymatic activity/volume)    

                          56 U/L                            

 

                                        Serum or plasma aspartate aminotransfera

se measurement (enzymatic 

activity/volume)           13 U/L                    5-34        

 

                                        Serum or plasma alanine aminotransferase

 measurement (enzymatic activity/volume)

                          10 U/L                    0-55        

 

                    Serum or plasma protein measurement (mass/volume)           

7.8 g/dL            

6.4-8.2        

 

                    Serum or plasma albumin measurement (mass/volume)           

4.8 g/dL            

3.2-4.5        

 

                                        Magnesium - 19 20:47         

 

                    Magnesium           2.0 mg/dL           1.6-2.4        

 

                                        Urine drug screening test - 19 21:

53         

 

                    Urine phencyclidine detection by screening method           

NEGATIVE            

NEGATIVE        

 

                          Urine benzodiazepines detection by screening method   

        NEGATIVE          

                                        NEGATIVE        

 

                    Urine cocaine detection           NEGATIVE            NEGATI

VE        

 

                    Urine amphetamines detection by screening method           N

EGATIVE            

NEGATIVE        

 

                          Urine methamphetamine detection by screening method   

        NEGATIVE          

                                        NEGATIVE        

 

                    Urine cannabinoids detection by screening method           N

EGATIVE            

NEGATIVE        

 

                    Urine opiates detection by screening method           NEGATI

VE            

NEGATIVE        

 

                    Urine barbiturates detection           NEGATIVE            N

EGATIVE        

 

                          Screening urine tricyclic antidepressants detection   

        NEGATIVE          

                                        NEGATIVE        

 

                    Urine methadone detection by screening method           NEGA

TIVE            

NEGATIVE        

 

                    Urine oxycodone detection           NEGATIVE            NEGA

TIVE        

 

                    Urine propoxyphene detection           NEGATIVE            N

EGATIVE        

 

                                        Complete urinalysis with reflex to cultu

re - 19 21:56         

 

                    Urine color determination           YELLOW              NRG 

       

 

                    Urine clarity determination           CLEAR               NR

G        

 

                    Urine pH measurement by test strip           5.5            

     5-9        

 

                    Specific gravity of urine by test strip           >=        

          1.016-1.022     

   

 

                          Urine protein assay by test strip, semi-quantitative  

         NEGATIVE         

                                        NEGATIVE        

 

                    Urine glucose detection by automated test strip           NE

GATIVE            

NEGATIVE        

 

                          Erythrocytes detection in urine sediment by light micr

oscopy           NEGATIVE 

                                        NEGATIVE        

 

                    Urine ketones detection by automated test strip           NE

GATIVE            

NEGATIVE        

 

                    Urine nitrite detection by test strip           NEGATIVE    

        NEGATIVE    

    

 

                    Urine total bilirubin detection by test strip           NEGA

TIVE            

NEGATIVE        

 

                          Urine urobilinogen measurement by automated test strip

 (mass/volume)           

0.2 mg/dL                               < = 1.0        

 

                    Urine leukocyte esterase detection by dipstick           NEG

ATIVE            

NEGATIVE        

 

                                        Automated urine sediment erythrocyte cou

nt by microscopy (number/high power 

field)                    RARE                      NRG        

 

                                        Automated urine sediment leukocyte count

 by microscopy (number/high power field)

                           [HPF]                    NRG        

 

                          Bacteria detection in urine sediment by light microsco

py           LARGE        

                                        NRG        

 

                                        Squamous epithelial cells detection in u

rine sediment by light microscopy       

                          10-25                     NRG        

 

                          Crystals detection in urine sediment by light microsco

py           NONE         

                                        NRG        

 

                    Casts detection in urine sediment by light microscopy       

    NONE                

NRG        

 

                          Mucus detection in urine sediment by light microscopy 

          NEGATIVE        

                                        NRG        

 

                    Complete urinalysis with reflex to culture           YES    

             NRG        

 

                                        Bacterial urine culture - 19 21:56

         

 

                    Bacterial urine culture           3 OR MORE            NRG  

      

 

                    COLONY COUNT           50,000 CFU/ML            NRG        

 

                    FTX;REPORTABLE           (GRAM POSITIVE) SUGGESTING PROBABLE

            NRG     

   

 

                    FREE TEXT ENTRY 2           COLLECTION CONTAMINATION WITH SK

IN            NRG   

     

 

                    FREE TEXT ENTRY 3           GANESH.  NO SUSCEPTIBILITY PERFOR

MED            NRG  

      



                                                                                
                                                                                
                



Encounters

      



                ACCT No.           Visit Date/Time           Discharge          

 Status         

             Pt. Type           Provider           Facility           Loc./Unit 

          

Complaint        

 

                [id number]           2019 10:00:00           2019 23:59:

59           CLS

                Outpatient           EDUARDO LORA LAC                          

 [facility]                                 

 

             [id number]           2019 09:40:00                              

       Document

 Registration                                                                   

 

 

                    [id number]           2019 20:38:00            22:48:00        

                DIS             Emergency           DANIELA DOMINIQUE, ARJUN MOYA    

       [facility] 

[facility]           ER                        SEIZURE/FALL   

     

 

                    [id number]           2019 15:04:00            18:10:00        

                DIS             Emergency           MORENA BLACK MD          

 [facility]           ER                        CANT REMEMBER ANYTHING,

 JAW 

SORE,HEADACHE        

 

                    [id number]           10/08/2019 23:39:00           10/09/2

019 01:05:00        

                DIS             Emergency           JANELLE FLANAGAN DO [facility]           ER                        SEIZURE,BLOODY LIP     

   

 

                    [id number]           10/08/2019 18:49:00           10/08/2

019 21:03:00        

                DIS             Emergency           JANELLE FLANAGAN DO [facility]           ER                        DIZZY,JAW HURTING      

  

 

                    [id number]           2019 14:41:00            15:25:00        

                DIS             Emergency           KADY OTERO       

    [facility]           ER                        BEE STING R ARM SWELLI

NG        

 

                    [id number]           2019 14:46:00            16:04:00        

                DIS             Emergency           DAYANA BLANKENSHIP         

  [facility]           ER                        POSS SEIZURE        

 

                    [id number]           2019 01:40:00            19:55:00        

                DIS             Inpatient           DEDE PATRICK MD         

  [facility]           LDRP                      BACK PAIN        

 

                    [id number]           2019 14:27:00            16:35:00        

                DIS             Outpatient           CANDELARIA COOPER MD         

  [facility]           WSo                       LOWER BACK PAIN;DIZZINE

SS        

 

                    [id number]           2019 12:02:00            15:20:00        

                DIS             Outpatient           SAGE DOMINIQUE, WENDY CONDE        

   [facility]           WSo                       ABD / BACK PAIN        

 

                    [id number]           2019 10:20:00            23:59:59        

                CLS             Outpatient           CANDELARIA COOPER MD         

  [facility]           RAD                       FETAL SURVEY        

 

                    [id number]           2019 12:09:00            23:59:59        

                CLS             Outpatient           CANDELARIA COOPER MD         

  [facility]           RAD                       FETAL DATES        

 

                    [id number]           2019 17:53:00            19:09:00        

                DIS             Emergency           DAYANA BLANKENSHIP         

  [facility]           ER                        PAIN IN SIDE OF NECK   

     

 

                    [id number]           2018 17:46:00            20:02:00        

                DIS             Emergency           DANIELA DOMINIQUE, ARJUN MOYA    

       [facility]ia 

[facility]           ER                        CHEST HURTS,DIZ

ZY,CANT FEEL 

L LEG        

 

                    [id number]           2018 06:09:00            13:00:00        

                DIS             Inpatient           CANDELARIA COOPER MD          

 Via [facility]           LDRP                      INDUCTION        

 

                    [id number]           2018 14:24:00            17:00:00        

                DIS             Outpatient           CANDELARIA COOPER MD         

  [facility]           WSo                       STOMACH PRESSURE,BACK P

AIN,NAUSEA  

      

 

                    [id number]           2018 14:38:00            15:45:00        

                DIS             Outpatient           CANDELARIA COOPER MD         

  [facility]                       STOMACH PRESSURE/BACK P

AIN        

 

                    [id number]           2018 13:07:00           

018 23:59:59        

                CLS             Outpatient           CANDELARIA COOPER MD         

  [facility]           RAD                       FETAL SURVEY        

 

                    [id number]           2017 20:10:00           

017 22:19:00        

                DIS             Emergency           ARJUN SUAREZ MD    

       [facility]ia 

[facility]           ER                        HEADACHE,DIZZIN

ESS,15WKS 

PREG        

 

                    [id number]           10/30/2017 13:25:00           10/30/2

017 23:59:59        

                CLS             Outpatient           CANDELARIA COOPER MD         

  [facility]           RAD                       DATING FETAL        

 

                    [id number]           2017 07:55:00           

017 23:59:59        

                CLS             Outpatient           CANDELARIA COOPER MD         

  [facility]           RT                        SEIZURE        

 

                    [id number]           2017 17:05:00           

017 21:18:00        

                DIS             Emergency           ARJUN SUAREZ MD    

       [facility] 

[facility]           ER                        SEIZURE        

 

                    [id number]           2016 00:10:00           

016 23:59:59        

                CLS             Preadmit           CANDELARIA COOPER MD           

[facility]                       PREVIOUS FETAL DEMISE  

      

 

                    [id number]           2016 15:29:00           07/10/2

016 00:01:00        

                DIS             Outpatient           CANDELARIA COOPER MD         

  [facility]                       PREVIOUS FETAL DEMISE  

      

 

                    [id number]           2016 14:28:00            14:15:00        

                DIS             Inpatient           CANDELARIA COOPER MD          

 [facility]           LDRP                      LABOR        

 

                    [id number]           2016 21:48:00            23:30:00        

                DIS             Outpatient           DEDE PATRICK MD        

   [facility]                       ABD PAIN        

 

                    [id number]           2016 13:45:00            23:59:59        

                CLS             Outpatient           CANDELARIA COOPER MD         

  [facility]           RAD                       PLACENTA PREVIA        

 

                    [id number]           2015 10:13:00           

015 23:59:59        

                CLS             Outpatient           CANDELARIA COOPER MD         

  [facility]           RAD                       FETAL SURVEY        

 

                    [id number]           10/12/2015 18:34:00           10/12/2

015 20:28:00        

                DIS             Emergency           DAYANA BLANKENSHIP         

  [facility]           ER                        NAUSEA/VOMITING @ 11 WE

EKS

## 2020-01-30 ENCOUNTER — HOSPITAL ENCOUNTER (EMERGENCY)
Dept: HOSPITAL 75 - ER | Age: 25
Discharge: HOME | End: 2020-01-30
Payer: MEDICAID

## 2020-01-30 VITALS — BODY MASS INDEX: 21.25 KG/M2 | HEIGHT: 62.99 IN | WEIGHT: 119.93 LBS

## 2020-01-30 VITALS — SYSTOLIC BLOOD PRESSURE: 133 MMHG | DIASTOLIC BLOOD PRESSURE: 74 MMHG

## 2020-01-30 DIAGNOSIS — I10: ICD-10-CM

## 2020-01-30 DIAGNOSIS — Z79.52: ICD-10-CM

## 2020-01-30 DIAGNOSIS — F17.210: ICD-10-CM

## 2020-01-30 DIAGNOSIS — G40.909: Primary | ICD-10-CM

## 2020-01-30 LAB
ALBUMIN SERPL-MCNC: 4.5 GM/DL (ref 3.2–4.5)
ALP SERPL-CCNC: 60 U/L (ref 40–136)
ALT SERPL-CCNC: 13 U/L (ref 0–55)
APAP SERPL-MCNC: < 10 UG/ML (ref 10–30)
APTT BLD: 25 SEC (ref 24–35)
APTT PPP: YELLOW S
BACTERIA #/AREA URNS HPF: (no result) /HPF
BARBITURATES UR QL: NEGATIVE
BASOPHILS # BLD AUTO: 0 10^3/UL (ref 0–0.1)
BASOPHILS NFR BLD AUTO: 0 % (ref 0–10)
BENZODIAZ UR QL SCN: NEGATIVE
BILIRUB SERPL-MCNC: 0.2 MG/DL (ref 0.1–1)
BILIRUB UR QL STRIP: NEGATIVE
BUN/CREAT SERPL: 15
CALCIUM SERPL-MCNC: 9.1 MG/DL (ref 8.5–10.1)
CHLORIDE SERPL-SCNC: 110 MMOL/L (ref 98–107)
CK MB SERPL-MCNC: 0.5 NG/ML (ref ?–6.6)
CK SERPL-CCNC: 81 U/L (ref 29–168)
CO2 SERPL-SCNC: 18 MMOL/L (ref 21–32)
COCAINE UR QL: NEGATIVE
CREAT SERPL-MCNC: 0.71 MG/DL (ref 0.6–1.3)
EOSINOPHIL # BLD AUTO: 0.1 10^3/UL (ref 0–0.3)
EOSINOPHIL NFR BLD AUTO: 1 % (ref 0–10)
ERYTHROCYTE [DISTWIDTH] IN BLOOD BY AUTOMATED COUNT: 15 % (ref 10–14.5)
FIBRINOGEN PPP-MCNC: CLEAR MG/DL
GFR SERPLBLD BASED ON 1.73 SQ M-ARVRAT: > 60 ML/MIN
GLUCOSE SERPL-MCNC: 93 MG/DL (ref 70–105)
GLUCOSE UR STRIP-MCNC: NEGATIVE MG/DL
HCT VFR BLD CALC: 34 % (ref 35–52)
HGB BLD-MCNC: 10.5 G/DL (ref 11.5–16)
INR PPP: 1.1 (ref 0.8–1.4)
KETONES UR QL STRIP: NEGATIVE
LEUKOCYTE ESTERASE UR QL STRIP: NEGATIVE
LYMPHOCYTES # BLD AUTO: 1.3 X 10^3 (ref 1–4)
LYMPHOCYTES NFR BLD AUTO: 14 % (ref 12–44)
MAGNESIUM SERPL-MCNC: 2 MG/DL (ref 1.6–2.4)
MANUAL DIFFERENTIAL PERFORMED BLD QL: NO
MCH RBC QN AUTO: 23 PG (ref 25–34)
MCHC RBC AUTO-ENTMCNC: 31 G/DL (ref 32–36)
MCV RBC AUTO: 74 FL (ref 80–99)
METHADONE UR QL SCN: NEGATIVE
METHAMPHETAMINE SCREEN URINE S: NEGATIVE
MONOCYTES # BLD AUTO: 0.6 X 10^3 (ref 0–1)
MONOCYTES NFR BLD AUTO: 6 % (ref 0–12)
NEUTROPHILS # BLD AUTO: 7.1 X 10^3 (ref 1.8–7.8)
NEUTROPHILS NFR BLD AUTO: 79 % (ref 42–75)
NITRITE UR QL STRIP: NEGATIVE
OPIATES UR QL SCN: NEGATIVE
OXYCODONE UR QL: NEGATIVE
PH UR STRIP: 6.5 [PH] (ref 5–9)
PLATELET # BLD: 343 10^3/UL (ref 130–400)
PMV BLD AUTO: 11.2 FL (ref 7.4–10.4)
POTASSIUM SERPL-SCNC: 4.2 MMOL/L (ref 3.6–5)
PROPOXYPH UR QL: NEGATIVE
PROT SERPL-MCNC: 7.5 GM/DL (ref 6.4–8.2)
PROT UR QL STRIP: NEGATIVE
PROTHROMBIN TIME: 14.4 SEC (ref 12.2–14.7)
RBC #/AREA URNS HPF: (no result) /HPF
SALICYLATES SERPL-MCNC: < 5 MG/DL (ref 5–20)
SODIUM SERPL-SCNC: 142 MMOL/L (ref 135–145)
SP GR UR STRIP: 1.02 (ref 1.02–1.02)
SQUAMOUS #/AREA URNS HPF: (no result) /HPF
TRICYCLICS UR QL SCN: NEGATIVE
TSH SERPL DL<=0.05 MIU/L-ACNC: 0.98 UIU/ML (ref 0.35–4.94)
WBC # BLD AUTO: 9 10^3/UL (ref 4.3–11)
WBC #/AREA URNS HPF: (no result) /HPF

## 2020-01-30 PROCEDURE — 82553 CREATINE MB FRACTION: CPT

## 2020-01-30 PROCEDURE — 93041 RHYTHM ECG TRACING: CPT

## 2020-01-30 PROCEDURE — 80329 ANALGESICS NON-OPIOID 1 OR 2: CPT

## 2020-01-30 PROCEDURE — 84703 CHORIONIC GONADOTROPIN ASSAY: CPT

## 2020-01-30 PROCEDURE — 85730 THROMBOPLASTIN TIME PARTIAL: CPT

## 2020-01-30 PROCEDURE — 80320 DRUG SCREEN QUANTALCOHOLS: CPT

## 2020-01-30 PROCEDURE — 85025 COMPLETE CBC W/AUTO DIFF WBC: CPT

## 2020-01-30 PROCEDURE — 84443 ASSAY THYROID STIM HORMONE: CPT

## 2020-01-30 PROCEDURE — 80053 COMPREHEN METABOLIC PANEL: CPT

## 2020-01-30 PROCEDURE — 81000 URINALYSIS NONAUTO W/SCOPE: CPT

## 2020-01-30 PROCEDURE — 80306 DRUG TEST PRSMV INSTRMNT: CPT

## 2020-01-30 PROCEDURE — 85610 PROTHROMBIN TIME: CPT

## 2020-01-30 PROCEDURE — 36415 COLL VENOUS BLD VENIPUNCTURE: CPT

## 2020-01-30 PROCEDURE — 83735 ASSAY OF MAGNESIUM: CPT

## 2020-01-30 PROCEDURE — 82550 ASSAY OF CK (CPK): CPT

## 2020-01-30 PROCEDURE — 83874 ASSAY OF MYOGLOBIN: CPT

## 2020-01-30 NOTE — ED GENERAL
General


Chief Complaint:  Neurological Problems


Stated Complaint:  HAD SEIZURE


Nursing Triage Note:  


PT AMBULATE TO ROOM 07 WITH C/O SEIZURE. PT STATES SHE THINKS SHE HAD ANOTHER 


SEIZURE. PT REPORTS HS OF EPILEPSY. PT REPORTS SHE DID NOT LOSE BOWEL/BLADDER 


CONTROL AND DID NOT HIT HER HEAD.


Nursing Sepsis Screen:  No Definite Risk


Source of Information:  Patient, Old Records





History of Present Illness


Date Seen by Provider:  2020


Time Seen by Provider:  18:00


Initial Comments


PT ARRIVES VIA POV FROM HOME--STATES A FRIEND BROUGHT HER, WHO CURRENTLY IS 

WATCHING HER CHILD, PER PT. 


PT STATES "I THINK I HAD ANOTHER SEIZURE" 


STATES SHE WAS HOLDING HER SON, AND SITTING IN THE CHAIR, AND STATES "I WOKE UP 

AND HE WAS SITTING ON THE FLOOR AND PLAYING" "I DON'T REMEMBER SITTING IN THE 

CHAIR" STATES SHE WAS STILL SITTING IN THE CHAIR IN THE SAME POSITION WHEN SHE 

"WOKE UP" AND DENIES ANY INJURY TO HERSELF. DOES NOT THINK THE CHILD WAS INJURED


STATES SHE "DOESN'T REMEMBER WHAT HAPPENED" BUT STATES IT HAPPENED ABOUT AN HOUR

AGO. 


NO ONE ELSE WAS IN THE HOME AT THE TIME, OTHER THAN HER CHILD. 





PT HAS NO COMPLAINTS OF ANY KIND. 


NO LOSS OF BOWEL OR BLADDER CONTROL


NO HEADACHE OR BODY ACHES


NO FEVER OR RECENT ILLNESS





PT HAS REPORTED HISTORY OF SEIZURES AND HAS BEEN PRESCRIBED KEPPRA


DENIES ANY RECENT MISSED DOSES OR CHANGES IN DOSE


STATES SHE HAS AT LEAST ONE SEIZURE A MONTH, AND THIS IS NO DIFFERENT IN ANY WAY





STATES SHE HAS NEVER BEEN TO NEUROLOGIST, BUT CLAIMS SHE HAS ONE IN [place], BUT 

DOES NOT KNOW NAME OF DR OR WHERE SHE IS SUPPOSED TO SEE HIM/HER. DOES PROVIDE 

THE NAME OF A DR MON IN [place], WITH [facility], ON HER PHONE. 





PT ALSO DOES NOT KNOW HOW OLD HER CHILD IS OR WHEN THE CHILD WAS BORN--HAS NO 

IDEA EVEN THE MONTH OR YEAR THAT HE WAS BORN


PT IS , AND DOES NOT HAVE CUSTODY OF ANY OF HER OTHER CHILDREN--2ND CHILD 

 AFTER BIRTH. ALL OTHER CHILDREN HAVE BEEN PERMANENTLY REMOVED FROM THE HOME

AND  PLACED INTO [facility], ALL THIS WAS PRIOR TO THE BIRTH OF HER LAST CHILD.




PER OLD RECORDS, SHE DELIVERED 19 VIA , LAST DELIVERY PRIOR TO T

[facility] WAS 18 AND THE ONE PRIOR TO THAT WAS 16. 





/BOYFRIEND IS IN [facility] AND HAS BEEN FOR A LONG TIME


PT LIVES BY HERSELF, DESPITE BEING ADVISED ON MULTIPLE PREVIOUS VISITS HERE THAT

SHE SHOULD NOT BE LIVING ALONE WITH HER CHILD. 


THIS CONCERN HAS BEEN DISCUSSED WITH DR. COOPER ON PREVIOUS ER VISIT, AND HE HAS 

REPEATEDLY HAS VOICED HIS CONCERNS WITH [facility] REGARDING THIS ISSUE, AND HAD 

BEEN UNDER THE IMPRESSION THAT CHILD WAS TO HAVE BEEN REMOVED FROM THE HOME AT 

BIRTH, BUT THIS DID NOT OCCUR. HE HAS STATED THAT A MALE NEIGHBOR/OLDER MALE 

CHECKS ON HER, AND SHE HAS A "WORKER" THAT CHECKS ON HER AS WELL. 





PT WITH MULTIPLE VISITS FOR REPORTED SEIZURES, AS WELL AS VARIOUS OTHER 

COMPLAINTS





PCP: DR. COOPER





Allergies and Home Medications


Allergies


Coded Allergies:  


     No Known Drug Allergies (Unverified , 10/12/15)





Home Medications


Amoxicillin 500 Mg Capsule, 1,000 MG PO BID


   Prescribed by: ARJUN KNOWLES on 197


Levetiracetam 500 Mg Tablet, 500 MG PO BID


   Prescribed by: ARJUN KNOWLES on 1/10/17 0658


Prednisone 20 Mg Tab, 40 MG PO DAILY


   Prescribed by: KADY MCGREGOR on 19 1510





Patient Home Medication List


Home Medication List Reviewed:  Yes





Review of Systems


Review of Systems


Constitutional:  no symptoms reported


EENTM:  no symptoms reported


Respiratory:  no symptoms reported


Cardiovascular:  no symptoms reported


Gastrointestinal:  no symptoms reported


Pregnant:  No


LMP:  Shashi 15, 2020 (NO BIRTH CONTROL)


Musculoskeletal:  no symptoms reported


Skin:  no symptoms reported


Psychiatric/Neurological:  See HPI


Hematologic/Lymphatic:  No Symptoms Reported


Immunological/Allergic:  no symptoms reported





Past Medical-Social-Family Hx


Past Med/Social Hx:  Reviewed and Corrections made


Patient Social History


Alcohol Use:  Denies Use


Recreational Drug Use:  Yes (DENIES BUT UDS + FOR AMPHETAMINES )


Drug of Choice:  DENIES BUT UDS + FOR AMPHETAMINES


Smoking Status:  Current Everyday Smoker (1 PPD)


Type Used:  Cigarettes (1 PPD)


2nd Hand Smoke Exposure:  No


Recent Foreign Travel:  No


Contact w/Someone Who Travel:  No


Recent Infectious Disease Expo:  No


Recent Hopitalizations:  No


Physical Abuse:  No


Sexual Abuse:  No


Mistreated:  No


Fear:  No





Immunizations Up To Date


Tetanus Booster (TDap):  Unknown


PED Vaccines UTD:  Yes





Seasonal Allergies


Seasonal Allergies:  No





Past Medical History


Surgeries:  Yes ( 19)


 Section


Respiratory:  No


Cardiac:  Yes


Hypertension


Neurological:  Yes


Seizure Disorder


Reproductive Disorders:  No


Genitourinary:  No


Gastrointestinal:  No


Musculoskeletal:  No


Endocrine:  No


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


Cancer:  No


Psychosocial:  No


Integumentary:  No


Blood Disorders:  No


Adverse Reaction/Blood Tranf:  No





Family Medical History





Patient reports no known family medical history.


No Pertinent Family Hx





Physical Exam


Vital Signs





Vital Signs - First Documented








 20





 18:03 19:58


 


Temp 38.2 


 


Pulse 78 


 


Resp 19 


 


B/P (MAP) 146/96 (113) 


 


Pulse Ox  99


 


O2 Delivery Room Air 





Capillary Refill : Less Than 3 Seconds


Height, Weight, BMI


Height: 5'3.00"


Weight: 120lbs. 0.0oz. [id number]; 21.00 BMI


Method:Stated


General Appearance:  No Apparent Distress, WD/WN, Other (DOES NOT APPEAR POST 

ICTAL, DOES NOT APPEAR ILL OR TO BE IN ANY DISCOMFORT OR DISTRESS. )


HEENT:  PERRL/EOMI, TMs Normal, Pharynx Normal, Other (EXTENSIVE DENTAL DECAY 

--ALL TEETH DECAYED DOWN TO GUMS--APPEARANCE OF "METH MOUTH". NO ORAL INJURY OR 

EVIDENCE OF ABSCESS. )


Neck:  Full Range of Motion, Normal Inspection, Non Tender, Supple


Respiratory:  Normal Breath Sounds, No Accessory Muscle Use, No Respiratory 

Distress


Cardiovascular:  Regular Rate, Rhythm, No Edema, No JVD, No Murmur, Normal 

Peripheral Pulses


Gastrointestinal:  Non Tender, Soft


Back:  Normal Inspection


Extremity:  Normal Inspection, Non Tender


Neurologic/Psychiatric:  Alert, Oriented x3, No Motor/Sensory Deficits, Normal 

Mood/Affect, CNs II-XII Norm as Tested, Other (SMILING, TALKATIVE)


Skin:  Normal Color, Warm/Dry, Tattoos/Piercings





Progress/Results/Core Measures


Suspected Sepsis


Recent Fever Within 48 Hours:  No


Infection Criteria Present:  None


New/Unexplained  Altered Menta:  No


Sepsis Screen:  No Definite Risk


SIRS


Temperature: 


Pulse: 78 


Respiratory Rate: 19


 


Laboratory Tests


20 18:27: White Blood Count 9.0


Blood Pressure 146 /96 


Mean: 113


 


Laboratory Tests


20 18:27: 


Creatinine 0.71, INR Comment 1.1, Platelet Count 343, Total Bilirubin 0.2








Results/Orders


Lab Results





Laboratory Tests








Test


 20


18:27 20


18:39 Range/Units


 


 


White Blood Count


 9.0 


 


 4.3-11.0


10^3/uL


 


Red Blood Count


 4.56 


 


 4.35-5.85


10^6/uL


 


Hemoglobin 10.5 L  11.5-16.0  G/DL


 


Hematocrit 34 L  35-52  %


 


Mean Corpuscular Volume 74 L  80-99  FL


 


Mean Corpuscular Hemoglobin 23 L  25-34  PG


 


Mean Corpuscular Hemoglobin


Concent 31 L


 


 32-36  G/DL





 


Red Cell Distribution Width 15.0 H  10.0-14.5  %


 


Platelet Count


 343 


 


 130-400


10^3/uL


 


Mean Platelet Volume 11.2 H  7.4-10.4  FL


 


Neutrophils (%) (Auto) 79 H  42-75  %


 


Lymphocytes (%) (Auto) 14   12-44  %


 


Monocytes (%) (Auto) 6   0-12  %


 


Eosinophils (%) (Auto) 1   0-10  %


 


Basophils (%) (Auto) 0   0-10  %


 


Neutrophils # (Auto) 7.1   1.8-7.8  X 10^3


 


Lymphocytes # (Auto) 1.3   1.0-4.0  X 10^3


 


Monocytes # (Auto) 0.6   0.0-1.0  X 10^3


 


Eosinophils # (Auto)


 0.1 


 


 0.0-0.3


10^3/uL


 


Basophils # (Auto)


 0.0 


 


 0.0-0.1


10^3/uL


 


Prothrombin Time 14.4   12.2-14.7  SEC


 


INR Comment 1.1   0.8-1.4  


 


Activated Partial


Thromboplast Time 25 


 


 24-35  SEC





 


Sodium Level 142   135-145  MMOL/L


 


Potassium Level 4.2   3.6-5.0  MMOL/L


 


Chloride Level 110 H    MMOL/L


 


Carbon Dioxide Level 18 L  21-32  MMOL/L


 


Anion Gap 14   5-14  MMOL/L


 


Blood Urea Nitrogen 11   7-18  MG/DL


 


Creatinine


 0.71 


 


 0.60-1.30


MG/DL


 


Estimat Glomerular Filtration


Rate > 60 


 


  





 


BUN/Creatinine Ratio 15    


 


Glucose Level 93     MG/DL


 


Calcium Level 9.1   8.5-10.1  MG/DL


 


Corrected Calcium 8.7   8.5-10.1  MG/DL


 


Magnesium Level 2.0   1.6-2.4  MG/DL


 


Total Bilirubin 0.2   0.1-1.0  MG/DL


 


Aspartate Amino Transf


(AST/SGOT) 18 


 


 5-34  U/L





 


Alanine Aminotransferase


(ALT/SGPT) 13 


 


 0-55  U/L





 


Alkaline Phosphatase 60     U/L


 


Total Creatine Kinase 81     U/L


 


Creatine Kinase MB 0.5   <6.6  NG/ML


 


Myoglobin


 45.5 


 


 10.0-92.0


NG/ML


 


Total Protein 7.5   6.4-8.2  GM/DL


 


Albumin 4.5   3.2-4.5  GM/DL


 


TSH Cascade Testing


 0.98 


 


 0.35-4.94


UIU/ML


 


Salicylates Level < 5.0 L  5.0-20.0  MG/DL


 


Acetaminophen Level < 10 L  10-30  UG/ML


 


Serum Alcohol < 10   <10  MG/DL


 


Urine Color  YELLOW   


 


Urine Clarity  CLEAR   


 


Urine pH  6.5  5-9  


 


Urine Specific Gravity  1.025 H 1.016-1.022  


 


Urine Protein  NEGATIVE  NEGATIVE  


 


Urine Glucose (UA)  NEGATIVE  NEGATIVE  


 


Urine Ketones  NEGATIVE  NEGATIVE  


 


Urine Nitrite  NEGATIVE  NEGATIVE  


 


Urine Bilirubin  NEGATIVE  NEGATIVE  


 


Urine Urobilinogen  0.2  < = 1.0  MG/DL


 


Urine Leukocyte Esterase  NEGATIVE  NEGATIVE  


 


Urine RBC (Auto)  3+ H NEGATIVE  


 


Urine RBC  2-5 H  /HPF


 


Urine WBC  NONE   /HPF


 


Urine Squamous Epithelial


Cells 


 2-5 


  /HPF





 


Urine Crystals  NONE   /LPF


 


Urine Bacteria  FEW H  /HPF


 


Urine Casts  NONE   /LPF


 


Urine Mucus  NEGATIVE   /LPF


 


Urine Culture Indicated  NO   


 


Urine Opiates Screen  NEGATIVE  NEGATIVE  


 


Urine Oxycodone Screen  NEGATIVE  NEGATIVE  


 


Urine Methadone Screen  NEGATIVE  NEGATIVE  


 


Urine Propoxyphene Screen  NEGATIVE  NEGATIVE  


 


Urine Barbiturates Screen  NEGATIVE  NEGATIVE  


 


Ur Tricyclic Antidepressants


Screen 


 NEGATIVE 


 NEGATIVE  





 


Urine Phencyclidine Screen  NEGATIVE  NEGATIVE  


 


Urine Amphetamines Screen  NEGATIVE  NEGATIVE  


 


Urine Methamphetamines Screen  NEGATIVE  NEGATIVE  


 


Urine Benzodiazepines Screen  NEGATIVE  NEGATIVE  


 


Urine Cocaine Screen  NEGATIVE  NEGATIVE  


 


Urine Cannabinoids Screen  NEGATIVE  NEGATIVE  








My Orders





Orders - JANELLE FLANAGAN DO


Ed Iv/Invasive Line Start (20 18:02)


Urine Pregnancy Bedside (20 18:02)


Monitor-Rhythm Ecg Trace Only (20 18:02)


Acetaminophen (20 18:02)


Alcohol (20 18:02)


Cbc With Automated Diff (20 18:02)


Comprehensive Metabolic Panel (20 18:02)


Creatine Kinase (20 18:02)


Creatine Kinase Mb (20 18:02)


Drug Screen Stat (Urine) (20 18:02)


Magnesium (20 18:02)


Protime With Inr (20 18:02)


Partial Thromboplastin Time (20 18:02)


Salicylate (20 18:02)


Thyroid Analyzer (20 18:02)


Ua Culture If Indicated (20 18:02)


Myoglobin Serum (20 18:02)


Ed Iv/Invasive Line Start (20 18:02)


Lactated Ringers (Lr 1000 Ml Iv Solution (20 18:02)





Medications Given in ED





Current Medications








 Medications  Dose


 Ordered  Sig/Nestor


 Route  Start Time


 Stop Time Status Last Admin


Dose Admin


 


 Lactated Ringer's  1,000 ml @ 


 0 mls/hr  Q0M ONCE


 IV  20 18:02


 20 18:05 DC 20 18:33


999 MLS/HR








Vital Signs/I&O











 20





 18:03 19:58


 


Temp 38.2 


 


Pulse 78 88


 


Resp 19 19


 


B/P (MAP) 146/96 (113) 133/74


 


Pulse Ox  99


 


O2 Delivery Room Air Room Air





Capillary Refill : Less Than 3 Seconds








Blood Pressure Mean:                    113








Progress Note :  


Progress Note


PT ON PHONE--TALKING/TEXTING FOR MOST OF STAY. 


UNEVENTFUL ER STAY





AGAIN, AND REPEATEDLY STRESSED THE IMPORTANCE THAT SHE IS NOT TO BE ALONE WITH 

HER CHILD AT ANY TIME--EVER


PT STATES HER BOYFRIEND HAS BEEN IN [facility], AND WILL BE IN [facility]ISON FOR 

UNKNOWN LENGTH OF TIME. 


PT STATES THERE IS A MALE FRIEND WHO CAN STAY WITH HER.





ECG


Initial ECG Impression Date:  2020


Initial ECG Impression Time:  18:14


Initial ECG Rate:  71


Initial ECG Rhythm:  Normal Sinus





Departure


Impression





   Primary Impression:  


   POSIBLE SEIZURE--SELF REPORTED


Disposition:  01 HOME, SELF-CARE


Condition:  Stable





Departure-Patient Inst.


Referrals:  


CANDELARIA COOPER MD (PCP/Family)


Primary Care Physician


Patient Instructions:  Seizures, Adult (DC)





Add. Discharge Instructions:  


LOTS OF CLEAR LIQUIDS





TYLENOL AS NEEDED FOR PAIN OR FEVER





TAKE YOUR MEDICATIONS AS PRESCRIBED





NO DRIVING!!!


DO NOT BE ALONE WITH YOUR CHILD AT ANY TIME!!!!





FOLLOW UP WITH YOUR DR NEXT WEEK FOR FURTHER CARE





All discharge instructions reviewed with patient and/or family. Voiced 

understanding.











JANELLE FLANAGAN DO                 2020 19:31